# Patient Record
Sex: MALE | Race: WHITE | Employment: FULL TIME | URBAN - METROPOLITAN AREA
[De-identification: names, ages, dates, MRNs, and addresses within clinical notes are randomized per-mention and may not be internally consistent; named-entity substitution may affect disease eponyms.]

---

## 2019-06-08 ENCOUNTER — APPOINTMENT (OUTPATIENT)
Dept: GENERAL RADIOLOGY | Facility: CLINIC | Age: 24
End: 2019-06-08
Attending: NURSE PRACTITIONER
Payer: COMMERCIAL

## 2019-06-08 ENCOUNTER — HOSPITAL ENCOUNTER (EMERGENCY)
Facility: CLINIC | Age: 24
Discharge: HOME | End: 2019-06-08
Attending: NURSE PRACTITIONER | Admitting: NURSE PRACTITIONER
Payer: COMMERCIAL

## 2019-06-08 VITALS
RESPIRATION RATE: 16 BRPM | SYSTOLIC BLOOD PRESSURE: 148 MMHG | HEART RATE: 99 BPM | WEIGHT: 249.56 LBS | TEMPERATURE: 98.1 F | BODY MASS INDEX: 34.82 KG/M2 | OXYGEN SATURATION: 99 % | DIASTOLIC BLOOD PRESSURE: 92 MMHG

## 2019-06-08 DIAGNOSIS — S62.312A: ICD-10-CM

## 2019-06-08 PROCEDURE — 99283 EMERGENCY DEPT VISIT LOW MDM: CPT | Mod: 25 | Performed by: NURSE PRACTITIONER

## 2019-06-08 PROCEDURE — 26600 TREAT METACARPAL FRACTURE: CPT | Mod: RT | Performed by: NURSE PRACTITIONER

## 2019-06-08 PROCEDURE — 99284 EMERGENCY DEPT VISIT MOD MDM: CPT | Mod: 25 | Performed by: NURSE PRACTITIONER

## 2019-06-08 PROCEDURE — 73130 X-RAY EXAM OF HAND: CPT | Mod: TC,RT

## 2019-06-08 RX ORDER — IBUPROFEN 600 MG/1
600 TABLET, FILM COATED ORAL EVERY 6 HOURS PRN
COMMUNITY

## 2019-06-08 ASSESSMENT — ENCOUNTER SYMPTOMS
BRUISES/BLEEDS EASILY: 0
WOUND: 1
COLOR CHANGE: 1
JOINT SWELLING: 1
WEAKNESS: 0
NUMBNESS: 0

## 2019-06-08 NOTE — LETTER
[facility] [place] EMERGENCY DEPARTMENT  [address]   [place] [address]  [phone]      2019    Jcarlos Maravilla  [address] [facility] [address]  [phone] (home)     : 1995      To Whom it may concern:    Jcarlos Maravilla was seen in our Emergency Department today, 2019.    For the next 1 days he should not work    The employee has right hand injury and will require only left handed work if able to accommodate until he is cleared by orthopedics.       Sincerely,    TOYA Corbett CNP

## 2019-06-08 NOTE — ED AVS SNAPSHOT
[facility] Emergency Department  [address] [place] [address]  Phone:  [phone]  Fax:  [phone]                                    Jcarlos Maravilla   MRN: [id number]    Department:  [facility] Emergency Department   Date of Visit:  6/8/2019           After Visit Summary Signature Page    I have received my discharge instructions, and my questions have been answered. I have discussed any challenges I see with this plan with the nurse or doctor.    ..........................................................................................................................................  Patient/Patient Representative Signature      ..........................................................................................................................................  Patient Representative Print Name and Relationship to Patient    ..................................................               ................................................  Date                                   Time    ..........................................................................................................................................  Reviewed by Signature/Title    ...................................................              ..............................................  Date                                               Time          22EPIC Rev 08/18

## 2019-06-09 NOTE — ED PROVIDER NOTES
"  History     Chief Complaint   Patient presents with     Hand Pain     Right hand injury while at working at [employer] in [place]. Occured yesterday at 2300     HPI  Jcarlos Maravilla is a 23 year old male who was working at [employer] last night and as he turned around his right hand struck a drink cooler noting immediate right hand pain and abrasion over 4th knuckle.  Since has noted more swelling and pain.  He presents after work this evening. Movement \"fist\" worsens pain. Has full extension of fingers.     Allergies:  Allergies   Allergen Reactions     No Known Drug Allergies        Problem List:    There are no active problems to display for this patient.       Past Medical History:    No past medical history on file.    Past Surgical History:    No past surgical history on file.    Family History:    No family history on file.    Social History:  Marital Status:  Single [1]  Social History     Tobacco Use     Smoking status: Current Some Day Smoker     Smokeless tobacco: Never Used     Tobacco comment: second hand exposure   Substance Use Topics     Alcohol use: No     Drug use: No        Medications:      acetaminophen-codeine (TYLENOL #3) 300-30 MG tablet   ibuprofen (ADVIL/MOTRIN) 600 MG tablet         Review of Systems   Musculoskeletal: Positive for joint swelling.   Skin: Positive for color change and wound.        Abrasion and bruising to dorsal right hand   Neurological: Negative for weakness and numbness.   Hematological: Does not bruise/bleed easily.       Physical Exam   BP: (!) 148/92  Pulse: 99  Temp: 98.1  F (36.7  C)  Resp: 16  Weight: 113.2 kg (249 lb 9 oz)  SpO2: 99 %      Physical Exam   Constitutional: He appears well-developed and well-nourished. No distress.   HENT:   Head: Normocephalic and atraumatic.   Musculoskeletal:        Right hand: He exhibits tenderness, bony tenderness and swelling. He exhibits normal range of motion. Normal sensation noted. Normal strength noted.        " Hands:  Neurological: He is alert. He has normal strength. No sensory deficit.   5/5 strength to right hand including finger opposition   Skin: He is not diaphoretic.   Nursing note and vitals reviewed.      Discussed with patient concern for boxer's fracture. Will proceed with xray. Tendons are intact.     Discussed with patient oblique mildly displaced fracture at base of right third metacarpal. Recommend splint and referral to orthopedics.  He is neurovascularly intact.         Splint application  Date/Time: 6/8/2019 11:41 PM  Performed by: Kacy Sanchez APRN CNP  Authorized by: Kacy Sanchez APRN CNP   Consent: Verbal consent obtained.  Risks and benefits: risks, benefits and alternatives were discussed  Consent given by: patient  Patient understanding: patient states understanding of the procedure being performed  Patient identity confirmed: verbally with patient  Location details: right hand  Splint type: Tear drop.  Supplies used: cotton padding,  elastic bandage and Ortho-Glass  Post-procedure: The splinted body part was neurovascularly unchanged following the procedure.  Patient tolerance: Patient tolerated the procedure well with no immediate complications                     Critical Care time:  none               Results for orders placed or performed during the hospital encounter of 06/08/19 (from the past 24 hour(s))   XR Hand Right G/E 3 Views    Narrative    XR HAND RT G/E 3 VW 6/8/2019 10:31 PM    HISTORY: Pain.    COMPARISON: None.      Impression    IMPRESSION: Mildly displaced oblique fracture of the base of the third  metacarpal.     BRANDEN WASHBURN MD       Medications - No data to display    Assessments & Plan (with Medical Decision Making)  Refer to orthopedics within 1 week. Discussed splint care and neurovascular checks to fingers. Take motrin in addition to T#3 for pain.      I have reviewed the nursing notes.    I have reviewed the findings, diagnosis, plan and need  for follow up with the patient.          Medication List      Started    acetaminophen-codeine 300-30 MG tablet  Commonly known as:  TYLENOL #3  1 tablet, Oral, EVERY 6 HOURS PRN            Final diagnoses:   Fracture of base of third metacarpal bone of right hand       6/8/2019   [facility] EMERGENCY DEPARTMENT     Kacy Sanchez, TOYA CNP  06/08/19 5633

## 2019-06-09 NOTE — DISCHARGE INSTRUCTIONS
You have a mildly displaced fracture of the base 3rd metacarpal bone right hand.  MUST follow up with orthopedic surgery within 3-7 days

## 2019-06-09 NOTE — ED TRIAGE NOTES
Pt presents with right hand injury. Pt was working when accidentally hit hand against drink station.

## 2020-05-03 ENCOUNTER — HOSPITAL ENCOUNTER (INPATIENT)
Facility: CLINIC | Age: 25
LOS: 1 days | Discharge: HOME | End: 2020-05-04
Attending: FAMILY MEDICINE | Admitting: HOSPITALIST
Payer: MEDICAID

## 2020-05-03 DIAGNOSIS — A41.9 BACTERIAL SEPSIS (H): ICD-10-CM

## 2020-05-03 DIAGNOSIS — I80.8 THROMBOPHLEBITIS ARM: Primary | ICD-10-CM

## 2020-05-03 DIAGNOSIS — F11.20 HEROIN ADDICTION (H): ICD-10-CM

## 2020-05-03 DIAGNOSIS — F19.10 IV DRUG ABUSE (H): ICD-10-CM

## 2020-05-03 DIAGNOSIS — R50.9 FEVER, UNSPECIFIED FEVER CAUSE: ICD-10-CM

## 2020-05-03 PROCEDURE — 96367 TX/PROPH/DG ADDL SEQ IV INF: CPT | Performed by: FAMILY MEDICINE

## 2020-05-03 PROCEDURE — 99285 EMERGENCY DEPT VISIT HI MDM: CPT | Mod: Z6 | Performed by: FAMILY MEDICINE

## 2020-05-03 PROCEDURE — 96365 THER/PROPH/DIAG IV INF INIT: CPT | Performed by: FAMILY MEDICINE

## 2020-05-03 PROCEDURE — 96361 HYDRATE IV INFUSION ADD-ON: CPT | Performed by: FAMILY MEDICINE

## 2020-05-03 PROCEDURE — 99285 EMERGENCY DEPT VISIT HI MDM: CPT | Mod: 25 | Performed by: FAMILY MEDICINE

## 2020-05-03 PROCEDURE — 96366 THER/PROPH/DIAG IV INF ADDON: CPT | Performed by: FAMILY MEDICINE

## 2020-05-04 ENCOUNTER — APPOINTMENT (OUTPATIENT)
Dept: ULTRASOUND IMAGING | Facility: CLINIC | Age: 25
End: 2020-05-04
Attending: HOSPITALIST
Payer: MEDICAID

## 2020-05-04 ENCOUNTER — APPOINTMENT (OUTPATIENT)
Dept: GENERAL RADIOLOGY | Facility: CLINIC | Age: 25
End: 2020-05-04
Attending: FAMILY MEDICINE
Payer: MEDICAID

## 2020-05-04 ENCOUNTER — APPOINTMENT (OUTPATIENT)
Dept: CARDIOLOGY | Facility: CLINIC | Age: 25
End: 2020-05-04
Attending: HOSPITALIST
Payer: MEDICAID

## 2020-05-04 VITALS
DIASTOLIC BLOOD PRESSURE: 78 MMHG | HEIGHT: 72 IN | HEART RATE: 73 BPM | TEMPERATURE: 98.1 F | OXYGEN SATURATION: 100 % | BODY MASS INDEX: 29.8 KG/M2 | RESPIRATION RATE: 18 BRPM | SYSTOLIC BLOOD PRESSURE: 120 MMHG | WEIGHT: 220 LBS

## 2020-05-04 PROBLEM — I80.8 THROMBOPHLEBITIS ARM: Status: ACTIVE | Noted: 2020-05-04

## 2020-05-04 PROBLEM — R50.9 FEVER OF UNKNOWN ORIGIN: Status: ACTIVE | Noted: 2020-05-04

## 2020-05-04 PROBLEM — F11.20 HEROIN ADDICTION (H): Status: ACTIVE | Noted: 2020-05-04

## 2020-05-04 PROBLEM — R50.9 FEVER: Status: ACTIVE | Noted: 2020-05-04

## 2020-05-04 PROBLEM — A41.9 BACTERIAL SEPSIS (H): Status: ACTIVE | Noted: 2020-05-04

## 2020-05-04 PROBLEM — R50.9 FEVER, UNSPECIFIED FEVER CAUSE: Status: ACTIVE | Noted: 2020-05-04

## 2020-05-04 PROBLEM — F19.10 IV DRUG ABUSE (H): Status: ACTIVE | Noted: 2020-05-04

## 2020-05-04 LAB
ALBUMIN UR-MCNC: NEGATIVE MG/DL
AMPHETAMINES UR QL: NOT DETECTED NG/ML
ANION GAP SERPL CALCULATED.3IONS-SCNC: 6 MMOL/L (ref 3–14)
ANION GAP SERPL CALCULATED.3IONS-SCNC: 6 MMOL/L (ref 3–14)
APPEARANCE UR: CLEAR
BARBITURATES UR QL SCN: NOT DETECTED NG/ML
BASOPHILS # BLD AUTO: 0 10E9/L (ref 0–0.2)
BASOPHILS NFR BLD AUTO: 0.7 %
BENZODIAZ UR QL SCN: ABNORMAL NG/ML
BILIRUB UR QL STRIP: NEGATIVE
BUN SERPL-MCNC: 10 MG/DL (ref 7–30)
BUN SERPL-MCNC: 9 MG/DL (ref 7–30)
BUPRENORPHINE UR QL: NOT DETECTED NG/ML
CALCIUM SERPL-MCNC: 7.5 MG/DL (ref 8.5–10.1)
CALCIUM SERPL-MCNC: 7.9 MG/DL (ref 8.5–10.1)
CANNABINOIDS UR QL: NOT DETECTED NG/ML
CHLORIDE SERPL-SCNC: 107 MMOL/L (ref 94–109)
CHLORIDE SERPL-SCNC: 109 MMOL/L (ref 94–109)
CO2 SERPL-SCNC: 23 MMOL/L (ref 20–32)
CO2 SERPL-SCNC: 24 MMOL/L (ref 20–32)
COCAINE UR QL SCN: NOT DETECTED NG/ML
COLOR UR AUTO: YELLOW
CREAT SERPL-MCNC: 0.97 MG/DL (ref 0.66–1.25)
CREAT SERPL-MCNC: 0.99 MG/DL (ref 0.66–1.25)
CREAT SERPL-MCNC: 1.24 MG/DL (ref 0.66–1.25)
CRP SERPL-MCNC: 30.9 MG/L (ref 0–8)
CRP SERPL-MCNC: 33.2 MG/L (ref 0–8)
D-METHAMPHET UR QL: NOT DETECTED NG/ML
DIFFERENTIAL METHOD BLD: ABNORMAL
EOSINOPHIL NFR BLD AUTO: 0.3 %
ERYTHROCYTE [DISTWIDTH] IN BLOOD BY AUTOMATED COUNT: 17.4 % (ref 10–15)
ERYTHROCYTE [DISTWIDTH] IN BLOOD BY AUTOMATED COUNT: 18.1 % (ref 10–15)
GFR SERPL CREATININE-BSD FRML MDRD: 82 ML/MIN/{1.73_M2}
GFR SERPL CREATININE-BSD FRML MDRD: >90 ML/MIN/{1.73_M2}
GFR SERPL CREATININE-BSD FRML MDRD: >90 ML/MIN/{1.73_M2}
GLUCOSE SERPL-MCNC: 86 MG/DL (ref 70–99)
GLUCOSE SERPL-MCNC: 88 MG/DL (ref 70–99)
GLUCOSE UR STRIP-MCNC: NEGATIVE MG/DL
HCT VFR BLD AUTO: 34.1 % (ref 40–53)
HCT VFR BLD AUTO: 39 % (ref 40–53)
HGB BLD-MCNC: 10.3 G/DL (ref 13.3–17.7)
HGB BLD-MCNC: 11.6 G/DL (ref 13.3–17.7)
HGB UR QL STRIP: NEGATIVE
IMM GRANULOCYTES # BLD: 0 10E9/L (ref 0–0.4)
IMM GRANULOCYTES NFR BLD: 0.3 %
KETONES UR STRIP-MCNC: 5 MG/DL
LACTATE BLD-SCNC: 0.5 MMOL/L (ref 0.7–2)
LACTATE BLD-SCNC: 2.3 MMOL/L (ref 0.7–2)
LEUKOCYTE ESTERASE UR QL STRIP: NEGATIVE
LYMPHOCYTES # BLD AUTO: 1.4 10E9/L (ref 0.8–5.3)
LYMPHOCYTES NFR BLD AUTO: 22.4 %
MCH RBC QN AUTO: 17.2 PG (ref 26.5–33)
MCH RBC QN AUTO: 17.3 PG (ref 26.5–33)
MCHC RBC AUTO-ENTMCNC: 29.7 G/DL (ref 31.5–36.5)
MCHC RBC AUTO-ENTMCNC: 30.2 G/DL (ref 31.5–36.5)
MCV RBC AUTO: 57 FL (ref 78–100)
MCV RBC AUTO: 58 FL (ref 78–100)
METHADONE UR QL SCN: NOT DETECTED NG/ML
MONOCYTES # BLD AUTO: 0.5 10E9/L (ref 0–1.3)
MONOCYTES NFR BLD AUTO: 9 %
NEUTROPHILS # BLD AUTO: 4.1 10E9/L (ref 1.6–8.3)
NEUTROPHILS NFR BLD AUTO: 67.3 %
NITRATE UR QL: NEGATIVE
NRBC # BLD AUTO: 0 10*3/UL
NRBC BLD AUTO-RTO: 0 /100
OPIATES UR QL SCN: ABNORMAL NG/ML
OXYCODONE UR QL SCN: NOT DETECTED NG/ML
PCP UR QL SCN: NOT DETECTED NG/ML
PH UR STRIP: 5 PH (ref 5–7)
PLATELET # BLD AUTO: 189 10E9/L (ref 150–450)
PLATELET # BLD AUTO: 190 10E9/L (ref 150–450)
PLATELET # BLD AUTO: 197 10E9/L (ref 150–450)
POTASSIUM SERPL-SCNC: 3.9 MMOL/L (ref 3.4–5.3)
POTASSIUM SERPL-SCNC: 3.9 MMOL/L (ref 3.4–5.3)
PROCALCITONIN SERPL-MCNC: 1.22 NG/ML
PROPOXYPH UR QL: NOT DETECTED NG/ML
RBC # BLD AUTO: 5.94 10E12/L (ref 4.4–5.9)
RBC # BLD AUTO: 6.73 10E12/L (ref 4.4–5.9)
SARS-COV-2 PCR COMMENT: NORMAL
SARS-COV-2 RNA SPEC QL NAA+PROBE: NEGATIVE
SARS-COV-2 RNA SPEC QL NAA+PROBE: NORMAL
SODIUM SERPL-SCNC: 137 MMOL/L (ref 133–144)
SODIUM SERPL-SCNC: 138 MMOL/L (ref 133–144)
SOURCE: ABNORMAL
SP GR UR STRIP: 1.01 (ref 1–1.03)
SPECIMEN SOURCE: NORMAL
SPECIMEN SOURCE: NORMAL
TRICYCLICS UR QL SCN: NOT DETECTED NG/ML
UROBILINOGEN UR STRIP-MCNC: 0 MG/DL (ref 0–2)
WBC # BLD AUTO: 4.4 10E9/L (ref 4–11)
WBC # BLD AUTO: 6 10E9/L (ref 4–11)

## 2020-05-04 PROCEDURE — 99223 1ST HOSP IP/OBS HIGH 75: CPT | Mod: AI | Performed by: HOSPITALIST

## 2020-05-04 PROCEDURE — 93306 TTE W/DOPPLER COMPLETE: CPT | Mod: 26 | Performed by: INTERNAL MEDICINE

## 2020-05-04 PROCEDURE — 25800030 ZZH RX IP 258 OP 636: Performed by: INTERNAL MEDICINE

## 2020-05-04 PROCEDURE — 80048 BASIC METABOLIC PNL TOTAL CA: CPT | Performed by: FAMILY MEDICINE

## 2020-05-04 PROCEDURE — 12000000 ZZH R&B MED SURG/OB

## 2020-05-04 PROCEDURE — 87077 CULTURE AEROBIC IDENTIFY: CPT | Performed by: FAMILY MEDICINE

## 2020-05-04 PROCEDURE — 87800 DETECT AGNT MULT DNA DIREC: CPT | Performed by: FAMILY MEDICINE

## 2020-05-04 PROCEDURE — 25800030 ZZH RX IP 258 OP 636: Performed by: HOSPITALIST

## 2020-05-04 PROCEDURE — 25000128 H RX IP 250 OP 636: Performed by: FAMILY MEDICINE

## 2020-05-04 PROCEDURE — 36415 COLL VENOUS BLD VENIPUNCTURE: CPT | Performed by: FAMILY MEDICINE

## 2020-05-04 PROCEDURE — 25000132 ZZH RX MED GY IP 250 OP 250 PS 637: Performed by: HOSPITALIST

## 2020-05-04 PROCEDURE — 80306 DRUG TEST PRSMV INSTRMNT: CPT | Performed by: FAMILY MEDICINE

## 2020-05-04 PROCEDURE — 99207 ZZC APP CREDIT; MD BILLING SHARED VISIT: CPT | Performed by: INTERNAL MEDICINE

## 2020-05-04 PROCEDURE — 93970 EXTREMITY STUDY: CPT

## 2020-05-04 PROCEDURE — 93306 TTE W/DOPPLER COMPLETE: CPT

## 2020-05-04 PROCEDURE — 25000128 H RX IP 250 OP 636: Performed by: HOSPITALIST

## 2020-05-04 PROCEDURE — 86140 C-REACTIVE PROTEIN: CPT | Performed by: FAMILY MEDICINE

## 2020-05-04 PROCEDURE — 86140 C-REACTIVE PROTEIN: CPT | Performed by: INTERNAL MEDICINE

## 2020-05-04 PROCEDURE — 84145 PROCALCITONIN (PCT): CPT | Performed by: FAMILY MEDICINE

## 2020-05-04 PROCEDURE — 82565 ASSAY OF CREATININE: CPT | Performed by: HOSPITALIST

## 2020-05-04 PROCEDURE — 85025 COMPLETE CBC W/AUTO DIFF WBC: CPT | Performed by: FAMILY MEDICINE

## 2020-05-04 PROCEDURE — 25000132 ZZH RX MED GY IP 250 OP 250 PS 637: Performed by: INTERNAL MEDICINE

## 2020-05-04 PROCEDURE — 81003 URINALYSIS AUTO W/O SCOPE: CPT | Performed by: FAMILY MEDICINE

## 2020-05-04 PROCEDURE — 83605 ASSAY OF LACTIC ACID: CPT | Performed by: FAMILY MEDICINE

## 2020-05-04 PROCEDURE — 87186 SC STD MICRODIL/AGAR DIL: CPT | Performed by: FAMILY MEDICINE

## 2020-05-04 PROCEDURE — 87635 SARS-COV-2 COVID-19 AMP PRB: CPT | Performed by: FAMILY MEDICINE

## 2020-05-04 PROCEDURE — 87040 BLOOD CULTURE FOR BACTERIA: CPT | Performed by: FAMILY MEDICINE

## 2020-05-04 PROCEDURE — 85049 AUTOMATED PLATELET COUNT: CPT | Performed by: HOSPITALIST

## 2020-05-04 PROCEDURE — 71045 X-RAY EXAM CHEST 1 VIEW: CPT | Mod: TC

## 2020-05-04 PROCEDURE — 25000132 ZZH RX MED GY IP 250 OP 250 PS 637: Performed by: FAMILY MEDICINE

## 2020-05-04 PROCEDURE — 25800030 ZZH RX IP 258 OP 636: Performed by: FAMILY MEDICINE

## 2020-05-04 PROCEDURE — 36415 COLL VENOUS BLD VENIPUNCTURE: CPT | Performed by: HOSPITALIST

## 2020-05-04 PROCEDURE — 85027 COMPLETE CBC AUTOMATED: CPT | Performed by: HOSPITALIST

## 2020-05-04 PROCEDURE — 80048 BASIC METABOLIC PNL TOTAL CA: CPT | Performed by: HOSPITALIST

## 2020-05-04 RX ORDER — SODIUM CHLORIDE 9 MG/ML
INJECTION, SOLUTION INTRAVENOUS CONTINUOUS
Status: DISCONTINUED | OUTPATIENT
Start: 2020-05-04 | End: 2020-05-04 | Stop reason: HOSPADM

## 2020-05-04 RX ORDER — ONDANSETRON 2 MG/ML
4 INJECTION INTRAMUSCULAR; INTRAVENOUS EVERY 6 HOURS PRN
Status: DISCONTINUED | OUTPATIENT
Start: 2020-05-04 | End: 2020-05-04 | Stop reason: HOSPADM

## 2020-05-04 RX ORDER — ACETAMINOPHEN 325 MG/1
650 TABLET ORAL EVERY 4 HOURS PRN
Status: DISCONTINUED | OUTPATIENT
Start: 2020-05-04 | End: 2020-05-04 | Stop reason: HOSPADM

## 2020-05-04 RX ORDER — IBUPROFEN 600 MG/1
600 TABLET, FILM COATED ORAL EVERY 6 HOURS PRN
Status: DISCONTINUED | OUTPATIENT
Start: 2020-05-04 | End: 2020-05-04 | Stop reason: DRUGHIGH

## 2020-05-04 RX ORDER — LIDOCAINE 40 MG/G
CREAM TOPICAL
Status: DISCONTINUED | OUTPATIENT
Start: 2020-05-04 | End: 2020-05-04 | Stop reason: HOSPADM

## 2020-05-04 RX ORDER — LORAZEPAM 2 MG/ML
1 INJECTION INTRAMUSCULAR EVERY 4 HOURS PRN
Status: DISCONTINUED | OUTPATIENT
Start: 2020-05-04 | End: 2020-05-04 | Stop reason: HOSPADM

## 2020-05-04 RX ORDER — DICYCLOMINE HYDROCHLORIDE 10 MG/1
10 CAPSULE ORAL 4 TIMES DAILY PRN
Status: DISCONTINUED | OUTPATIENT
Start: 2020-05-04 | End: 2020-05-04 | Stop reason: HOSPADM

## 2020-05-04 RX ORDER — CLONIDINE HYDROCHLORIDE 0.1 MG/1
0.1 TABLET ORAL 3 TIMES DAILY PRN
Status: DISCONTINUED | OUTPATIENT
Start: 2020-05-04 | End: 2020-05-04 | Stop reason: HOSPADM

## 2020-05-04 RX ORDER — LOPERAMIDE HCL 2 MG
2 CAPSULE ORAL 4 TIMES DAILY PRN
Status: DISCONTINUED | OUTPATIENT
Start: 2020-05-04 | End: 2020-05-04 | Stop reason: HOSPADM

## 2020-05-04 RX ORDER — LOPERAMIDE HCL 2 MG
2 CAPSULE ORAL 4 TIMES DAILY PRN
Qty: 20 CAPSULE | Refills: 0 | Status: SHIPPED | OUTPATIENT
Start: 2020-05-04

## 2020-05-04 RX ORDER — NALOXONE HYDROCHLORIDE 0.4 MG/ML
.1-.4 INJECTION, SOLUTION INTRAMUSCULAR; INTRAVENOUS; SUBCUTANEOUS
Status: DISCONTINUED | OUTPATIENT
Start: 2020-05-04 | End: 2020-05-04 | Stop reason: HOSPADM

## 2020-05-04 RX ORDER — METOPROLOL TARTRATE 25 MG/1
25 TABLET, FILM COATED ORAL 2 TIMES DAILY PRN
Status: DISCONTINUED | OUTPATIENT
Start: 2020-05-04 | End: 2020-05-04 | Stop reason: HOSPADM

## 2020-05-04 RX ORDER — MORPHINE SULFATE 2 MG/ML
2-4 INJECTION, SOLUTION INTRAMUSCULAR; INTRAVENOUS
Status: DISCONTINUED | OUTPATIENT
Start: 2020-05-04 | End: 2020-05-04

## 2020-05-04 RX ORDER — LORAZEPAM 1 MG/1
1 TABLET ORAL EVERY 4 HOURS PRN
Status: DISCONTINUED | OUTPATIENT
Start: 2020-05-04 | End: 2020-05-04 | Stop reason: HOSPADM

## 2020-05-04 RX ORDER — CALCIUM CARBONATE 500 MG/1
500 TABLET, CHEWABLE ORAL 2 TIMES DAILY
Status: DISCONTINUED | OUTPATIENT
Start: 2020-05-04 | End: 2020-05-04 | Stop reason: HOSPADM

## 2020-05-04 RX ORDER — FAMOTIDINE 10 MG
10 TABLET ORAL 2 TIMES DAILY
Status: DISCONTINUED | OUTPATIENT
Start: 2020-05-04 | End: 2020-05-04 | Stop reason: HOSPADM

## 2020-05-04 RX ORDER — IBUPROFEN 400 MG/1
400 TABLET, FILM COATED ORAL EVERY 6 HOURS PRN
Status: DISCONTINUED | OUTPATIENT
Start: 2020-05-04 | End: 2020-05-04 | Stop reason: HOSPADM

## 2020-05-04 RX ORDER — OXYCODONE HCL 10 MG/1
10 TABLET, FILM COATED, EXTENDED RELEASE ORAL ONCE
Status: COMPLETED | OUTPATIENT
Start: 2020-05-04 | End: 2020-05-04

## 2020-05-04 RX ORDER — ONDANSETRON 4 MG/1
4 TABLET, ORALLY DISINTEGRATING ORAL EVERY 6 HOURS PRN
Status: DISCONTINUED | OUTPATIENT
Start: 2020-05-04 | End: 2020-05-04 | Stop reason: HOSPADM

## 2020-05-04 RX ADMIN — OXYCODONE HYDROCHLORIDE 10 MG: 10 TABLET, FILM COATED, EXTENDED RELEASE ORAL at 19:41

## 2020-05-04 RX ADMIN — SODIUM CHLORIDE 1000 ML: 9 INJECTION, SOLUTION INTRAVENOUS at 01:29

## 2020-05-04 RX ADMIN — SODIUM CHLORIDE 1000 ML: 9 INJECTION, SOLUTION INTRAVENOUS at 00:45

## 2020-05-04 RX ADMIN — ACETAMINOPHEN 650 MG: 325 TABLET, FILM COATED ORAL at 06:01

## 2020-05-04 RX ADMIN — TAZOBACTAM SODIUM AND PIPERACILLIN SODIUM 4.5 G: 500; 4 INJECTION, SOLUTION INTRAVENOUS at 09:15

## 2020-05-04 RX ADMIN — CALCIUM CARBONATE (ANTACID) CHEW TAB 500 MG 500 MG: 500 CHEW TAB at 19:41

## 2020-05-04 RX ADMIN — APIXABAN 10 MG: 5 TABLET, FILM COATED ORAL at 19:41

## 2020-05-04 RX ADMIN — AMOXICILLIN AND CLAVULANATE POTASSIUM 1 TABLET: 875; 125 TABLET, FILM COATED ORAL at 19:42

## 2020-05-04 RX ADMIN — TAZOBACTAM SODIUM AND PIPERACILLIN SODIUM 4.5 G: 500; 4 INJECTION, SOLUTION INTRAVENOUS at 01:48

## 2020-05-04 RX ADMIN — VANCOMYCIN HYDROCHLORIDE 2000 MG: 1 INJECTION, POWDER, LYOPHILIZED, FOR SOLUTION INTRAVENOUS at 02:24

## 2020-05-04 RX ADMIN — FAMOTIDINE 10 MG: 10 TABLET ORAL at 09:16

## 2020-05-04 RX ADMIN — SODIUM CHLORIDE: 9 INJECTION, SOLUTION INTRAVENOUS at 06:02

## 2020-05-04 RX ADMIN — SODIUM CHLORIDE 1000 ML: 9 INJECTION, SOLUTION INTRAVENOUS at 02:23

## 2020-05-04 RX ADMIN — CALCIUM CARBONATE (ANTACID) CHEW TAB 500 MG 500 MG: 500 CHEW TAB at 09:16

## 2020-05-04 RX ADMIN — SODIUM CHLORIDE: 9 INJECTION, SOLUTION INTRAVENOUS at 14:02

## 2020-05-04 RX ADMIN — ENOXAPARIN SODIUM 40 MG: 40 INJECTION SUBCUTANEOUS at 09:16

## 2020-05-04 RX ADMIN — TAZOBACTAM SODIUM AND PIPERACILLIN SODIUM 4.5 G: 500; 4 INJECTION, SOLUTION INTRAVENOUS at 14:17

## 2020-05-04 RX ADMIN — VANCOMYCIN HYDROCHLORIDE 2000 MG: 1 INJECTION, POWDER, LYOPHILIZED, FOR SOLUTION INTRAVENOUS at 14:54

## 2020-05-04 ASSESSMENT — ACTIVITIES OF DAILY LIVING (ADL)
TRANSFERRING: 0-->INDEPENDENT
ADLS_ACUITY_SCORE: 10
ADLS_ACUITY_SCORE: 10
FALL_HISTORY_WITHIN_LAST_SIX_MONTHS: NO
AMBULATION: 0-->INDEPENDENT
TOILETING: 0-->INDEPENDENT
RETIRED_COMMUNICATION: 0-->UNDERSTANDS/COMMUNICATES WITHOUT DIFFICULTY
DRESS: 0-->INDEPENDENT
COGNITION: 0 - NO COGNITION ISSUES REPORTED
SWALLOWING: 0-->SWALLOWS FOODS/LIQUIDS WITHOUT DIFFICULTY
ADLS_ACUITY_SCORE: 11
BATHING: 0-->INDEPENDENT
ADLS_ACUITY_SCORE: 10
RETIRED_EATING: 0-->INDEPENDENT

## 2020-05-04 ASSESSMENT — MIFFLIN-ST. JEOR: SCORE: 2035.91

## 2020-05-04 NOTE — ED NOTES
Pt states that he injects heroin daily and also on occasion uses marijuana, xanax, percocet, and alcohol. Pt has marks noted to both arms that he states are from injection heroin. Pt reports April first he reached out to [facility] teen [facility] for help with his drug use but has not received and help of information from them. Pt states this evening he would like to see if he could get some help with his drug problem. Pt is cooperative and denies any thoughts or plan to self harm. Pt admits when he was younger he attempted self harm by cutting.

## 2020-05-04 NOTE — DISCHARGE INSTRUCTIONS
Behavioral/Mental Health Resources  [place], [place], [facility], [facility], [place], [place], [facility], [place] and [facility]    **Patient should check with their health insurance to identify providers in network**    Mental Health Crisis Numbers:  [place] after hours Crisis Line      [phone]     Options For Deaf + Hard of Hearing    1-208.576.1240    Text [place] to 085694      24/7 Crisis Texting line    Trans Lifeline  (Fight the epidemic of trans suicide)  1-290.941.6182    https://www.translifeline.org/    The BELLA HelpLine can be reached Monday through Friday, 10 am-6 pm, ET.  6-532-362-BELLA (0822) or info@bella.org    National Suicide Prevention LifeLine       Call the National Suicide Prevention Lifeline at 1-518-471-TALK (7052) to be connected to a counselor at a crisis center in your area if you, a family member, or friend are experiencing thoughts of suicide. The call is FREE, confidential, and always available.     *Fast-Tracker is an online mental health/chemical dependency resource site. Mental health providers, care coordinators and consumers can go to www.fast-trackermn.org  to search for community mental health providers and information with a real-time, searchable directory of mental health resources and their availability within [place]*    Crisis Mobile Services:  [facility]   [phone]  [facility]   [phone]  [facility]       [phone]  [facility]        SAME AS ABOVE  [facility]       SAME AS ABOVE  [facility]      SAME AS ABOVE  [facility]      SAME AS ABOVE  [place] and [place]:      [phone] or [phone]  Other Counties:    [place]-  Adults   [phone]   Children [phone]   [place]-  Adults  [phone]  Children [phone]     Pregnancy & Post-partum Support     Helpline [phone]  Farm & Rural Helpline NEW 3-2019    [phone]       Chemical Health Services:    Medicare & Chemical  Health Assessments:  [facility]   [address]   [place], [place]   [phone]      [facility] [address] [facility]    [address], [place]  [phone]  [facility]          [address]     [place], [place]  [phone]    Rule 25 and/or Chemical Health Assessment:  If a person has insurance, s/he must contact their insurance companies Behavioral Health division and follow recommendations for a chemical health assessment and then follow the recommendations of the .    If a person does NOT have insurance, they must get a Rule 25 (state funded chemical health assessment).  They can contact their [facility]'s Chemical Health Unit to discover who their [facility] networks to conduct the assessment.      Chemical Dependency Detox:  - [facility]:  [phone] - can ask for other recommendations  - [facility]/[place]([facility]):  [phone]  - [facility] ([facility]):  [phone]  - [facility] : [phone] [facility]  - [facility]):  [phone]  - [place] ([facility]):  [phone]    Chemical Dependency Assessment:   - [facility]: [phone]   - Behavioral Health Providers, can help find a provider near you:  [phone]  - No insurance- call county of residence and ask about applying for a Rule 25    Counseling/psychotherapy:  - Associated clinic of psychology - [place],/[place]/ [facility]/[place] /other locations: [phone]  - Behavioral Healthcare Providers- Can help find a provider near you:  [phone]  - [facility] Services-[place]:  [phone]  - [facility]- [place]:  [phone]  - [facility]- [place]:  [phone]  - [facility]-[place]: [phone]  - [facility]- [place]/[facility]/other locations:  [phone]  - [facility]-  [facility]/[place]:  [phone]  - [facility] - [place]:  [phone]  - [facility] [place] - [place]:  [phone]  - [facility]- [place]-based in [place]:  [phone]  - [phone]  - [facility]- [place]: [phone]  - [facility]- [place] [phone]  - [facility] counseling located in [place] (not affiliated with [place]): [phone]  - [facility]- [place]:  [phone]/[place]: [phone] and other locations.  - [facility]- [place]: [phone]  - Psychiatric Recovery- [place]:  [phone]  - Therapeutic Services Agency- [facility]/[place]: [phone]  - Walk in counseling center (Free counseling services)- [facility]: [phone]     Psychiatry/ Mental Health Medication management:  - [facility] psychology- [place],/[place]/[facility]/ [facility]/ [facility]: [phone]  - Behavioral Healthcare Providers- Can help find a provider near you, if you have preferred one or [facility] they can identify who is in network and assist with scheduling an appointment:  [phone]  - [facility]: [place]/[facility] locations : [phone]  - [facility] -Collaborative model with PCP involvement:  [phone] (requires PCP referral specifically)  - [facility]- [place]:  [phone]  - [facility]- [place]: [phone] [place]/[facility]:  [phone]/[place]:  [phone]/ [place]:  [phone]  - Psychiatric Recovery- [place]:   [phone]  - [facility] [place], [phone]  - [facility] Psychiatry - Medical students who rotate yearly:  [phone]    County Numbers:  - [facility] [place], [facility] by [facility]: [phone]  - [facility]: [phone]  - [place]: [phone]  - [employer] : [phone]  - [facility]:  [phone]  - [facility]: [phone]  - [facility]: [phone]  - [facility]: [phone]  - [facility]: [phone]  - [facility]: [phone]      Chemical Dependency  [place]  [facility], and [place]                         [phone] [facility]                                                                 [address]                                             [place] [address]  [phone]   [address] [place]   [place]: [phone]  [place]: [phone] [facility]   [place]: [phone]   Sobriety First   [place]: [phone]   Alcohol Hotline   1-891.677.1097     Teen Chemical Dependency  [facility]   [place]: [phone]   [place] [facility]   Teen and adult chemical dependency   [phone]   Mental Health  [address] [address]  [phone]   [facility]   24 hour mental health crisis services   [phone] or [phone]  [place]: [phone]  [place]: [phone]  [place]: [phone]     [facility]: [phone]  [place]: [phone]   [facility]: [phone]     National Suicide Prevention Lifeline   1-213.392.2742 1-511.980.2716 Deaf/ hard of hearing    Text Crisis hotline   Text home to 541962      Disaster Distress helpline   1-168.563.6115  Text TalkWithUs to 67691   [facility] Crisis Line   1-425.518.2508   Dual (Chemical Dependency & Mental Health)  [facility] [place]  Chemical dependency, depression, anger, trauma, loss   [place]: [phone]  [place]: [phone] Women s Recovery and Support Services   Chemical dependency and mental health   [place]: [phone]     **Please note that this list does not include all agencies that provide services**    Care Transitions Team at [facility] [phone]       breast and formula feeding

## 2020-05-04 NOTE — ED TRIAGE NOTES
Pt reports using IV heroin tonight with a friend who overdosed. States he was walking down the street and stopped by the police who informed him of the overdose and told him that he should come to the hospital to be evaluated. Pt reports feeling depressed for the past 2 years but denies any thoughts of self harm or a plan to harm self. Pt reports he has attempted to get help for his drug problem but has been unsuccessful and is requesting help for that at this time.

## 2020-05-04 NOTE — PLAN OF CARE
S. End of shift report    B.  Drug use/Covid R/O    A. VSS, patient is afebrile this shift, RA. Tolerating a regular diet. UP independently in the room. Lungs are clear. And patient has adequate UOP.  Drug screen and UA sent. Zosyn and vanco given. Gracie patients aunt is concerned with patients drug abuse and past behaviors, information was shared with the provider. U/S came back unremarkable. ECHO is in progress.    R. Will continue to monitor per POC.

## 2020-05-04 NOTE — PROGRESS NOTES
Patient was admitted earlier today, with fever up to 101.4, lactic acid acidosis of 2.3, which resolved with IV fluids.  Patient received total 3 L NS in ER.  Remains afebrile throughout the day.  Normal WBCs.  COVID-19 tested negative.  Tested positive for hearing.  Using 0.5 g daily.  No symptoms of opiate withdrawal yet.  Dramatic treatment with clonidine, Imodium, Tylenol, ibuprofen, Bentyl as needed for opiate withdrawal.  Will consider dose of buprenorphine, for symptoms of severe withdrawal.  Patient's aunt Garcie call today, asking to speak to provider.  I asked patient's permission, he declined, stating he will take with him by himself.  States being interested in chemical dependency treatment.  Denies previous histories of chemical dependency.   seen patient today from CD referrals.  Plan of care as outlined at Dr. Matthew [facility].  Plan to discharge home tomorrow if remains afebrile..    Mary Uribe MD

## 2020-05-04 NOTE — CONSULTS
CARE TRANSITION SOCIAL WORK INITIAL ASSESSMENT:      Met with: Patient.    DATA  Principal Problem:    Fever, unspecified fever cause  Active Problems:    Fever    Bacterial sepsis (H)    Heroin addiction (H)    IV drug abuse (H)    Fever of unknown origin       ASSESSMENT  Cognitive Status: awake, alert and oriented        Other Resources: Chemical Dependency Services     Insurance Concerns: No Insurance issues identified     This writer met with pt, by phone, introduced self and role. Discussed discharge planning and chemical dependency resources.  Patient states he has already had the Rule 25 assessment through [place] [facility].  He states he is on a waiting list and waiting for a call for admission.  Patient states he is living with his sister and this is a safe environment for him until he gets into treatment.  Discussed new telephonic services through [place] [facility].  Provided patient with handout.  No other identified discharge needs/concerns.    PLAN    Home with CD follow up    DELANO Springer  [facility] [phone]/ [place] [phone]

## 2020-05-04 NOTE — ED NOTES
DATE:  5/4/2020   TIME OF RECEIPT FROM LAB:  0057  LAB TEST:  Lactic acid  LAB VALUE:  2.3  RESULTS GIVEN WITH READ-BACK TO (PROVIDER):  Dr Cesar and primary RN  TIME LAB VALUE REPORTED TO PROVIDER:   0057

## 2020-05-04 NOTE — PROGRESS NOTES
S-(situation): Patient arrives to room 250 via cart from ED    B-(background): Drug use and Covid rule out.     A-(assessment): Pt presents to room alert and oriented x4 is very pleasant as well as cooperative.   Pt demonstrated safe and steady independent ambulation.  No pain or discomfort reported on admission.   No cough or shortness of breath reported or observed.   There is some old healing bruised marks from injection sites for drug use.   Pt it very honest and open about his drug use.   Pt denies any desire or urge to harm himself.   Iv is patent and infusing.   Tylenol given for a temp of 101.2  Vitals are otherwise stable.   /48   Pulse 111   Temp 101.2  F (38.4  C) (Oral)   Resp 18   Ht 1.829 m (6')   Wt 99.8 kg (220 lb)   SpO2 96%   BMI 29.84 kg/m    Lung sounds are slightly diminished in the bases are otherwise clear throughout.   Pt demonstrated and understanding of call light use.   Pt knows to use the urinal and that a urine sample is to be collected.   Pt is agreeable and cooperative with all contact and cares.   Will continue to monitor and promote comfort as care continues.     R-(recommendations): Orders reviewed with Patient. Will monitor patient per MD orders. YES    Inpatient nursing criteria listed below were met:    Health care directives status obtained and documented: No - no directives in place  SCD's Documented: No - Lovenox for VTE  Skin issues/needs documented:Yes  Isolation needs addressed, if appropriate: Yes  Fall Prevention: Care plan updated, Education given and documented Yes  MRSA swab completed for ICU admissions only: NA  Care Plan initiated: Yes  Education Assessment documented:Yes  Education Documented (Pre-existing chronic infection such as, MRSA/VRE need education on admission): No  Admission Medication Reconciliation completed: Yes  New medication patient education completed and documented (Possible Side Effects of Common Medications handout): Yes  Home  medications if not able to send immediately home with family stored here: NA  Reminder note placed in discharge instructions: NA  Discharge planning review completed (admission navigator) Yes

## 2020-05-04 NOTE — DISCHARGE SUMMARY
[facility]  Hospitalist Discharge Summary      Date of Admission:  5/3/2020  Date of Discharge:  5/4/2020  Discharging Provider: Mary Uribe MD    Discharge Diagnoses   IV drug use, heroin  Febrile illness  Suspected COVID-19 infection-tested negative  Staphylococcus aureus bacteremia  Superficial occlusive thrombophlebitis of the right median cubital vein    Follow-ups Needed After Discharge   Follow-up Appointments     Follow-up and recommended labs and tests       Follow up with primary care provider, Physician No Ref-Primary, within 7   days for hospital follow- up.  The following labs/tests are recommended:   CBC, BMP, INR in 1 week.             Unresulted Labs Ordered in the Past 30 Days of this Admission     Date and Time Order Name Status Description    5/4/2020 0021 Blood culture Preliminary     5/4/2020 0021 Blood culture Preliminary       These results will be followed up by me.  On 5/5/2020 patient was referred to [facility] admission for evaluation of Staphylococcus aureus bacterial endocarditis.    Discharge Disposition   Discharged to home  Condition at discharge: Stable  Patient was discharged home on 5/4/2020.  Overnight called with positive Staphylococcus aureus blood cultures.  Patient returned to our ED, where blood cultures were drawn, patient was evaluated, and transferred by family to [facility] under the care of Dr. Maravilla, hospitalist and Dr. valencia cardiology.    Hospital Course   Jcarlos Maravilla is a 24 year old male with PMH of IVDU/heroin, injecting 0.5 g of heroin daily, admitted on 5/3/2020. He is admitted with fever.  Temperature up to 101.2.  No pulmonary infiltrates, urinary symptoms.  COVID-19 tested negative. IV tracks in right antecubital area, no purulent phlebitis exam.  US negative for DVT, showed occlusive right medial vein thrombophlebitis.  Patient was treated with vancomycin and Zosyn.  He defervesced  throughout the day.  Stable vitals.  Transthoracic echo showed normal LV function no valvular abnormalities, no findings suspicious for endocarditis.  Patient was discharged home with prescription for Augmentin and Xarelto for occlusive thrombophlebitis.  He does not have medical insurance, thus is at retail pharmacy in the morning to obtain medications who is available coupons.  Mild signs of opiate withdrawal, diaphoresis.  Patient was given 10 mg of.  Overnight, after patient is discharged, call received about blood cultures 5/3/2020 growing Staphylococcus aureus, 1/2 bottles.  Patient was contacted, his grandmother answered.  Patient was instructed to present stat to ED for evaluation, obtaining set of blood cultures.  I spoke to cardiology and hospitalist at [facility], recommended further evaluation for bacterial endocarditis/SKYE offered in our facility.  From our ED, if patient is hemodynamically stable, plan for the pt to be transferred by family to [facility]. He is accepted  to room 510, unit 55/Dr.   Consultations This Hospital Stay   PHARMACY TO DOSE VANCO  SOCIAL WORK IP CONSULT    Code Status   Full Code    Time Spent on this Encounter   I, Mary Uribe MD, personally saw the patient today and spent greater than 30 minutes discharging this patient.       Mary Uribe MD  [facility]  ______________________________________________________________________    Physical Exam   Vital Signs: Temp: 98.1  F (36.7  C) Temp src: Axillary BP: 120/78 Pulse: 73 Heart Rate: 75 Resp: 18 SpO2: 100 % O2 Device: None (Room air)    Weight: 220 lbs 0 oz  Alert and oriented #3, in no apparent distress, no signs of opiate withdrawal  HEENT: Pupils equal and reactive to light and accommodation, extraocular motions, moist mucous membranes  Heart was rhythmic and regular S1, S2, no murmurs lungs clear to auscultation, good air movement bilaterally soft, nontender,  nondistended, audible bowel sounds  No visible nephropathy, skin rash  Right antecubital area with indurated tender vein without warmth; tracks from IV injections       Primary Care Physician   Physician No Ref-Primary    Discharge Orders      Reason for your hospital stay    Thrombophlebitis     Follow-up and recommended labs and tests     Follow up with primary care provider, Physician No Ref-Primary, within 7 days for hospital follow- up.  The following labs/tests are recommended: CBC, BMP, INR in 1 week.     Activity    Your activity upon discharge: activity as tolerated     Full Code     Diet    Follow this diet upon discharge: Orders Placed This Encounter      Room Service      Combination Diet Regular Diet Adult       Significant Results and Procedures   Most Recent 3 CBC's:  Recent Labs   Lab Test 05/04/20  0551 05/04/20  0139   WBC 4.4 6.0   HGB 10.3* 11.6*   MCV 57* 58*     190 197     Most Recent 3 BMP's:  Recent Labs   Lab Test 05/04/20  0551 05/04/20  0139    137   POTASSIUM 3.9 3.9   CHLORIDE 109 107   CO2 23 24   BUN 9 10   CR 0.99  0.97 1.24   ANIONGAP 6 6   MAGALI 7.5* 7.9*   GLC 86 88     Most Recent 2 LFT's:No lab results found.  Most Recent 3 INR's:No lab results found.  Most Recent D-dimer:No lab results found.  Most Recent TSH and T4:No lab results found.  Most Recent 6 glucoses:  Recent Labs   Lab Test 05/04/20  0551 05/04/20  0139   GLC 86 88     Most Recent ESR & CRP:  Recent Labs   Lab Test 05/04/20  0551   CRP 30.9*   ,   Results for orders placed or performed during the hospital encounter of 05/03/20   XR Chest Port 1 View    Narrative    EXAM: XR CHEST PORT 1 VW  LOCATION: [facility]  DATE/TIME: 5/4/2020 1:30 AM    INDICATION: Fever.  COMPARISON: None.      Impression    IMPRESSION: Negative chest.   US Upper Extremity Venous Duplex Bilat    Narrative    ULTRASOUND UPPER EXTREMITY VENOUS DUPLEX BILATERAL   5/4/2020 9:14 AM     HISTORY: History of IV drug abuse;  fever with unknown source. Rule out  upper extremity infective thrombophlebitis.    COMPARISON: None available    FINDINGS: Gray-scale, color and Doppler spectral analysis ultrasound  was performed of the bilateral upper extremities. Compression and  augmentation imaging was performed.    No evidence of deep venous thrombosis in either upper extremity. No  flow is visualized in the right median cubital vein, adjacent to the  area of lump, consistent with occlusive superficial thrombophlebitis.      Impression    IMPRESSION:   1. No evidence of deep venous thrombosis in either upper extremity.  2. Superficial thrombophlebitis of the right median cubital vein.    LORA JENSEN MD   Echocardiogram Complete    Narrative    [id number]  [id number]  [id number]  [id number]^[facility]^RAHEEM           [facility]  Echocardiography Laboratory  [address] [place] [address]        Name: RUDY VIVAS  MRN: [id number]  : 1995  Study Date: 2020 02:25 PM  Age: 24 yrs  Gender: Male  Patient Location: [facility]  Reason For Study: Fever  Ordering Physician: RAHEEM MCKENNA  Performed By: Teresa Clarke RDCS     BSA: 2.2 m2  Height: 72 in  Weight: 218 lb  HR: 71  BP: 100/54 mmHg  _____________________________________________________________________________  __        Procedure  Complete Echo Adult.  _____________________________________________________________________________  __        Interpretation Summary     Left ventricular size, global systolic function, and wall motion are normal,  estimated LVEF 60-65%.  Right ventricular global function is normal.  No significant valvular abnormalities.  Dilation of the inferior vena cava is present with abnormal respiratory  variation in diameter.     There are no prior studies available for comparison.  _____________________________________________________________________________  __        Left Ventricle  The left ventricle is normal in size.  There is normal left ventricular wall  thickness. Left ventricular systolic function is normal. The visual ejection  fraction is estimated at 60-65%. Left ventricular diastolic function is  normal. No regional wall motion abnormalities noted.     Right Ventricle  The right ventricle is normal in structure, function and size.     Atria  Normal left atrial size. Right atrial size is normal.     Mitral Valve  The mitral valve is normal in structure and function.        Tricuspid Valve  The tricuspid valve is not well visualized, but is grossly normal. There is  trace tricuspid regurgitation.     Aortic Valve  The aortic valve is normal in structure and function.     Pulmonic Valve  The pulmonic valve is not well seen, but is grossly normal.     Vessels  The aortic root is normal size. Normal size ascending aorta. Dilation of the  inferior vena cava is present with abnormal respiratory variation in diameter.     Pericardium  There is no pericardial effusion.     _____________________________________________________________________________  __  MMode/2D Measurements & Calculations  IVSd: 1.0 cm  LVIDd: 5.6 cm  LVIDs: 4.4 cm  LVPWd: 1.1 cm  FS: 22.7 %     LV mass(C)d: 236.0 grams  LV mass(C)dI: 106.8 grams/m2  Ao root diam: 3.2 cm  LA dimension: 4.3 cm  asc Aorta Diam: 3.0 cm  LA/Ao: 1.4  LA Volume (BP): 70.9 ml  LA Volume Index (BP): 32.1 ml/m2  RWT: 0.39        Doppler Measurements & Calculations  MV E max june: 57.4 cm/sec     MV dec slope: 202.3 cm/sec2  MV dec time: 0.29 sec  PA acc time: 0.17 sec  TR max june: 130.3 cm/sec  TR max P.8 mmHg  E/E' av.0  Lateral E/e': 4.9  Medial E/e': 5.1           _____________________________________________________________________________  __           Report approved by: Carlos Cloon 2020 03:36 PM            Discharge Medications   Current Discharge Medication List      START taking these medications    Details   amoxicillin-clavulanate (AUGMENTIN) 875-125 MG tablet  Take 1 tablet by mouth every 12 hours for 7 days  Qty: 14 tablet, Refills: 0    Associated Diagnoses: Thrombophlebitis arm      loperamide (IMODIUM) 2 MG capsule Take 1 capsule (2 mg) by mouth 4 times daily as needed for diarrhea  Qty: 20 capsule, Refills: 0    Associated Diagnoses: Heroin addiction (H)      rivaroxaban ANTICOAGULANT (XARELTO) 10 MG TABS tablet Take 1 tablet (10 mg) by mouth daily (with dinner)  Qty: 45 tablet, Refills: 0    Associated Diagnoses: Thrombophlebitis arm         CONTINUE these medications which have NOT CHANGED    Details   ibuprofen (ADVIL/MOTRIN) 600 MG tablet Take 600 mg by mouth every 6 hours as needed for moderate pain         STOP taking these medications       acetaminophen-codeine (TYLENOL #3) 300-30 MG tablet Comments:   Reason for Stopping:             Allergies   Allergies   Allergen Reactions     No Known Drug Allergies

## 2020-05-04 NOTE — ED PROVIDER NOTES
[facility] ED Provider Note   Patient: Jcarlos Maravilla  MRN #:  [id number]  Date of Visit: May 4, 2020    CC:     Chief Complaint   Patient presents with     Addiction Problem     HPI:  Jcarlos Maravilla is a 24 year old male who presented to the emergency department by foot requesting help for his heroin addiction.  Patient states that he was using with a friend at around 4-5 PM tonight.  His friend's father broke into his friend's house, and was yelling at him and demanding him to leave.  Police stopped him this evening on the street and told him that his friend that overdose and that he should come to the emergency department to be seen.  Patient states that he has been using heroin on a daily basis.  He started using heroin 2 years ago, and prefers to use intravenous injections.  He uses this with other friends as well.  He had an accidental overdose at the end of March.  He states that he is trying to get on Suboxone or trying to get into a treatment program but it is extremely difficult.  He asked if we prescribed Suboxone.  He has depression but has not had suicidal ideation or plan recently.  He does have some passive thoughts at times.  His father, Shane, was a known drug user and  of an overdose.  His mother  of a pontine stroke.  Patient has a sister, Eli, and another brother.  Patient denies any other drug use except for marijuana.  Patient was noted to have a fever upon arrival.    Problem List:  There are no active problems to display for this patient.      History reviewed. No pertinent past medical history.    MEDS: acetaminophen-codeine (TYLENOL #3) 300-30 MG tablet  ibuprofen (ADVIL/MOTRIN) 600 MG tablet        ALLERGIES:    Allergies   Allergen Reactions     No Known Drug Allergies        History reviewed. No pertinent surgical history.    Social History     Tobacco Use     Smoking status: Current Some Day Smoker      Smokeless tobacco: Never Used     Tobacco comment: second hand exposure   Substance Use Topics     Alcohol use: Yes     Comment: occasional      Drug use: Yes     Frequency: 7.0 times per week     Types: Marijuana, Opiates     Comment: reports injecting 1-3.5grams of heroin daily         Review of Systems   Except as noted in HPI, all other systems were reviewed and are negative    Physical Exam     Vitals were reviewed  Patient Vitals for the past 12 hrs:   BP Temp Temp src Pulse Heart Rate Resp SpO2 Weight   05/04/20 0300 111/72 -- -- 108 113 25 95 % --   05/04/20 0245 108/70 -- -- 107 105 14 97 % --   05/04/20 0230 115/68 -- -- 111 111 19 96 % --   05/04/20 0215 121/76 -- -- 109 109 20 96 % --   05/04/20 0200 108/75 -- -- 105 103 18 98 % --   05/04/20 0145 110/79 -- -- 107 105 19 97 % --   05/04/20 0140 -- -- -- -- -- -- -- 99.2 kg (218 lb 9.6 oz)   05/04/20 0130 113/62 -- -- 110 112 21 99 % --   05/04/20 0115 113/63 -- -- 110 110 16 98 % --   05/04/20 0112 -- 99.7  F (37.6  C) Oral -- -- -- -- --   05/04/20 0100 110/68 -- -- 115 120 12 95 % --   05/04/20 0045 110/63 -- -- 114 112 21 97 % --   05/04/20 0040 113/71 -- -- 112 115 17 97 % --   05/04/20 0025 110/61 -- -- 116 123 10 98 % --   05/04/20 0001 116/76 101.4  F (38.6  C) Oral -- 138 16 97 % --     GENERAL APPEARANCE: Patient is somnolent,  FACE: normal facies  EYES: Pupils are pinpoint  HENT: normal external exam  NECK: no adenopathy or asymmetry  RESP: normal respiratory effort; clear breath sounds bilaterally  CV: regular rate and rhythm; no appreciable murmurs, gallops or rubs  ABD: soft, no tenderness; no rebound or guarding; bowel sounds are normal  EXT: Needle marks in both antecubital fossa, right side worse than left.  There is some evidence for local phlebitis with palpable cordlike vessels.  No signs of lymphangitic streaking  SKIN: As above  NEURO: no facial droop; no focal deficits, speech is mumbled  PSYCH: Flat affect      Available  Lab/Imaging Results     Results for orders placed or performed during the hospital encounter of 05/03/20 (from the past 24 hour(s))   Lactic acid whole blood   Result Value Ref Range    Lactic Acid 2.3 (H) 0.7 - 2.0 mmol/L   XR Chest Port 1 View    Narrative    EXAM: XR CHEST PORT 1 VW  LOCATION: [facility]  DATE/TIME: 5/4/2020 1:30 AM    INDICATION: Fever.  COMPARISON: None.      Impression    IMPRESSION: Negative chest.   Basic metabolic panel   Result Value Ref Range    Sodium 137 133 - 144 mmol/L    Potassium 3.9 3.4 - 5.3 mmol/L    Chloride 107 94 - 109 mmol/L    Carbon Dioxide 24 20 - 32 mmol/L    Anion Gap 6 3 - 14 mmol/L    Glucose 88 70 - 99 mg/dL    Urea Nitrogen 10 7 - 30 mg/dL    Creatinine 1.24 0.66 - 1.25 mg/dL    GFR Estimate 82 >60 mL/min/[1.73_m2]    GFR Estimate If Black >90 >60 mL/min/[1.73_m2]    Calcium 7.9 (L) 8.5 - 10.1 mg/dL   CBC with platelets differential   Result Value Ref Range    WBC 6.0 4.0 - 11.0 10e9/L    RBC Count 6.73 (H) 4.4 - 5.9 10e12/L    Hemoglobin 11.6 (L) 13.3 - 17.7 g/dL    Hematocrit 39.0 (L) 40.0 - 53.0 %    MCV 58 (L) 78 - 100 fl    MCH 17.2 (L) 26.5 - 33.0 pg    MCHC 29.7 (L) 31.5 - 36.5 g/dL    RDW 18.1 (H) 10.0 - 15.0 %    Platelet Count 197 150 - 450 10e9/L    Diff Method Automated Method     % Neutrophils 67.3 %    % Lymphocytes 22.4 %    % Monocytes 9.0 %    % Eosinophils 0.3 %    % Basophils 0.7 %    % Immature Granulocytes 0.3 %    Nucleated RBCs 0 0 /100    Absolute Neutrophil 4.1 1.6 - 8.3 10e9/L    Absolute Lymphocytes 1.4 0.8 - 5.3 10e9/L    Absolute Monocytes 0.5 0.0 - 1.3 10e9/L    Absolute Basophils 0.0 0.0 - 0.2 10e9/L    Abs Immature Granulocytes 0.0 0 - 0.4 10e9/L    Absolute Nucleated RBC 0.0    CRP inflammation   Result Value Ref Range    CRP Inflammation 33.2 (H) 0.0 - 8.0 mg/L         The Lactic acid level is elevated due to Recent IV drug use versus early sepsis., at this time there is no sign of severe sepsis or septic  shock.      Impression     Final diagnoses:   Heroin addiction (H)   Fever   Bacterial sepsis (H)   IV drug abuse (H)         ED Course & Medical Decision Making   Jcarlos Maravilla is a 24 year old male who presented to the emergency department with concerns for chronic continuous heroin abuse.  Patient was using IV heroin tonight with a friend who ended up with an overdose and had to be seen in the emergency department.  Patient was told to come to the emergency department for further evaluation.  Upon arrival, we noted that the patient's temperature is 101.4 with a heart rate of 138.  Blood pressure is 116/76, respiratory rate of 16 with 97% oxygen saturation.  Patient was very somnolent from his presumed recent IV heroin use.  Lungs are clear, cardiovascular exam revealed rapid heart rate but no appreciable murmur.  Abdomen was soft and nontender.  Extremities reveal track marks from his IV drug abuse in both arms, right side worse than left.  There is superficial phlebitis.  There is no obvious cellulitis.  Laboratory work-up reveals a white blood count of 6.0, hemoglobin of 11.6, platelet count of 197.  There is no significant left shift.  Basic metabolic panel was normal except for low calcium level of 7.9.  CRP is elevated at 33.2.  Lactic acid level was elevated at 2.3.  Patient received 2 L of normal saline.  Chest x-ray revealed no definite infiltrates.  Patient was started on empiric IV antibiotics with Zosyn and vancomycin for suspected bacterial sepsis.  Patient is at high risk for infectious complications from his IV drug abuse.    2:48 AM: Patient remains somnolent.  Blood pressure remained stable.  Heart rate is down slightly to 103.  Oxygen saturations remain between 97-98%.  COVID-19 testing was obtained at this time due to this possibility.    3:19 AM: Transition orders have been written.  We will order a echocardiogram to rule out infective endocarditis.  Reassess in the morning to determine  whether upper extremity ultrasound would be warranted to rule out infective thrombophlebitis.       ED to Inpatient Handoff:    Discussed with Dr. Garcia at 0310 hr  Patient accepted for Inpatient Stay  Pending studies include blood cultures, echocardiogram  Code Status: Full Code           Disclaimer: This note consists of words and symbols derived from keyboarding and dictation using voice recognition software.  As a result, there may be errors that have gone undetected.  Please consider this when interpreting information found in this note.       Brea Cesar MD  05/04/20 [id number]

## 2020-05-04 NOTE — H&P
[facility]    History and Physical - Hospitalist Service       Date of Admission:  5/3/2020    Assessment & Plan   Jcarlos Maravilla is a 24 year old male admitted on 5/3/2020. He is admitted with fever ,sepsis    Principal Problem:    Fever, unspecified fever cause    Assessment: may be from Heroin injection sites but they dont look infected. He is stable and answered all questions. His lactae improved but he started to spike fevers again    Plan: he already recieved Zosyn and vancomycin, will continue them. Ordered ECHO. Ordered IV fluids, ordered COVID -19 because of fevers and risks. It is pending, patient is in isolation.  Active Problems:    Bacterial sepsis (H)    Assessment: no clear source    Plan: ordered Zosyn, vancomycin, blood cultures pending    Heroin addiction (H)    Assessment: patient is stable now no withdrawal symptoms    Plan: Ordered IV morphine but if he starts to have withdrawal probably needs to change it to methadone.  He needs addiction treatment.   Ordered  consult         Diet: Advance Diet as Tolerated: Clear Liquid Diet    DVT Prophylaxis: Enoxaparin (Lovenox) SQ  Ocok Catheter: not present  Code Status: FULL    Disposition Plan   Expected discharge: 2 - 3 days, recommended to prior living arrangement once safe disposition plan/ TCU bed available.  Entered: Lois Garcia MD 05/04/2020, 5:31 AM     The patient's care was discussed with the Patient.    Lois Garcia MD  [facility]    ______________________________________________________________________    Chief Complaint   Heroin use    History is obtained from the patient    History of Present Illness   Jcarlos Maravilla is a 24 year old male who presented to the ER with help[ for heroine addiction.  Patient states that he was using with a friend at around 4-5 PM last night.  His friend's father broke into his friend's house, and was yelling at him  and demanding him to leave.  Police stopped him this evening on the street and told him that his friend that overdose and that he should come to the emergency department to be seen.  Patient states that he has been using heroin on a daily basis.  He started using heroin 2 years ago, and prefers to use intravenous injections.  He uses this with other friends as well.  He had an accidental overdose at the end of March. He uses around 0.5 gm every day for last 2 years. He states that he is trying to get on Suboxone or trying to get into a treatment program but it is extremely difficult.  He told me that he [facility] in [facility] [place].   He has depression but has not had suicidal ideation or plan recently.  He does have some passive thoughts at times.  His father, Shane, was a known drug user and  of an overdose.  His mother  of a pontine stroke.  Patient has a sister, Eli, and another brother.  Patient denies any other drug use except for marijuana.  Patient was noted to have a fever upon arrival.  In ER he had elevated temp and tachycardia. He was given IV fluids. Blood work showed normal except elevated CRP and lactate.  So patient is getting admitted for fever, sepsis - unknown source other than IV drug use but no cellulitis noted near the sites.      Review of Systems    The 10 point Review of Systems is negative other than noted in the HPI or here.     Past Medical History    I have reviewed this patient's medical history and updated it with pertinent information if needed.   History reviewed. No pertinent past medical history.    Past Surgical History   I have reviewed this patient's surgical history and updated it with pertinent information if needed.  History reviewed. No pertinent surgical history.    Social History   I have reviewed this patient's social history and updated it with pertinent information if needed.  Social History     Tobacco Use     Smoking status: Current  Some Day Smoker     Smokeless tobacco: Never Used     Tobacco comment: second hand exposure   Substance Use Topics     Alcohol use: Yes     Comment: occasional      Drug use: Yes     Frequency: 7.0 times per week     Types: Marijuana, Opiates     Comment: reports injecting 1-3.5grams of heroin daily       Family History   I have reviewed this patient's family history and updated it with pertinent information if needed.   History reviewed. No pertinent family history.    Prior to Admission Medications   Prior to Admission Medications   Prescriptions Last Dose Informant Patient Reported? Taking?   acetaminophen-codeine (TYLENOL #3) 300-30 MG tablet Unknown at Unknown time  No No   Sig: Take 1 tablet by mouth every 6 hours as needed for severe pain   ibuprofen (ADVIL/MOTRIN) 600 MG tablet Unknown at Unknown time  Yes No   Sig: Take 600 mg by mouth every 6 hours as needed for moderate pain      Facility-Administered Medications: None     Allergies   Allergies   Allergen Reactions     No Known Drug Allergies        Physical Exam   Vital Signs: Temp: 101.2  F (38.4  C) Temp src: Oral BP: 104/48 Pulse: 111 Heart Rate: 116 Resp: 18 SpO2: 96 % O2 Device: None (Room air)    Weight: 220 lbs 0 oz    Constitutional: awake, alert, cooperative, no apparent distress, and appears stated age  Eyes: Lids and lashes normal, pupils equal, round and reactive to light, extra ocular muscles intact, sclera clear, conjunctiva normal  ENT: Normocephalic, without obvious abnormality, atraumatic, sinuses nontender on palpation, external ears without lesions, oral pharynx with moist mucous membranes, tonsils without erythema or exudates, gums normal and good dentition.  Respiratory: No increased work of breathing, good air exchange, clear to auscultation bilaterally, no crackles or wheezing  Cardiovascular: Normal apical impulse, regular rate and rhythm, normal S1 and S2, no S3 or S4, and no murmur noted  GI: No scars, normal bowel sounds, soft,  non-distended, non-tender, no masses palpated, no hepatosplenomegally  Skin: there are IV injecting marks in both elbow regions more on right, there is firm mass like tissues around the sites but non tender looks like chronic thrombophlebitis. No erythema or tenderness, warmth noted.  Neurologic: Awake, alert, oriented to name, place and time.  Cranial nerves II-XII are grossly intact.  Motor is 5 out of 5 bilaterally.  Cerebellar finger to nose, heel to shin intact.  Sensory is intact.  Babinski down going, Romberg negative, and gait is normal.    Data   Data reviewed today: I reviewed all medications, new labs and imaging results over the last 24 hours..    Recent Labs   Lab 05/04/20  0139   WBC 6.0   HGB 11.6*   MCV 58*         POTASSIUM 3.9   CHLORIDE 107   CO2 24   BUN 10   CR 1.24   ANIONGAP 6   MAGALI 7.9*   GLC 88     Recent Results (from the past 24 hour(s))   XR Chest Port 1 View    Narrative    EXAM: XR CHEST PORT 1 VW  LOCATION: [facility]  DATE/TIME: 5/4/2020 1:30 AM    INDICATION: Fever.  COMPARISON: None.      Impression    IMPRESSION: Negative chest.

## 2020-05-04 NOTE — PROGRESS NOTES
Writer verified with lab patient COVID test came back negative @ 1400 5/4/2020.  Provider contacted of the negative results.

## 2020-05-04 NOTE — CONSULTS
Pharmacy Vancomycin Initial Note  Date of Service May 4, 2020  Patient's  1995  24 year old, male    Indication: Sepsis    Current estimated CrCl = Estimated Creatinine Clearance: 114.3 mL/min (based on SCr of 1.24 mg/dL).    Creatinine for last 3 days  2020:  1:39 AM Creatinine 1.24 mg/dL    Recent Vancomycin Level(s) for last 3 days  No results found for requested labs within last 72 hours.      Vancomycin IV Administrations (past 72 hours)                   vancomycin (VANCOCIN) 2,000 mg in sodium chloride 0.9 % 500 mL intermittent infusion (mg) 2,000 mg New Bag 20 0224                Nephrotoxins and other renal medications (From now, onward)    Start     Dose/Rate Route Frequency Ordered Stop    20 1430  vancomycin (VANCOCIN) 2,000 mg in sodium chloride 0.9 % 500 mL intermittent infusion      2,000 mg  over 2 Hours Intravenous EVERY 12 HOURS 20 0523      20 0800  piperacillin-tazobactam (ZOSYN) intermittent infusion 4.5 g      4.5 g  over 30 Minutes Intravenous EVERY 6 HOURS 20 0500            Contrast Orders - past 72 hours (72h ago, onward)    None                Plan:  1.  Started patient on vancomycin  2000 mg IV q12h.   2.  Goal Trough Level: 15-20 mg/L   3.  Pharmacy will check trough levels as appropriate in 1-3 Days.    4. Serum creatinine levels will be ordered daily for the first week of therapy and at least twice weekly for subsequent weeks.    5. Bena method utilized to dose vancomycin therapy: Method 2    Rodri Sharma RPH

## 2020-05-05 ENCOUNTER — HOSPITAL ENCOUNTER (INPATIENT)
Facility: CLINIC | Age: 25
LOS: 29 days | Discharge: HOME | End: 2020-06-03
Attending: HOSPITALIST | Admitting: INTERNAL MEDICINE
Payer: MEDICAID

## 2020-05-05 ENCOUNTER — HOSPITAL ENCOUNTER (EMERGENCY)
Facility: CLINIC | Age: 25
Discharge: HOME | End: 2020-05-05
Attending: FAMILY MEDICINE | Admitting: FAMILY MEDICINE
Payer: MEDICAID

## 2020-05-05 VITALS
RESPIRATION RATE: 14 BRPM | DIASTOLIC BLOOD PRESSURE: 83 MMHG | SYSTOLIC BLOOD PRESSURE: 136 MMHG | OXYGEN SATURATION: 99 % | TEMPERATURE: 97.2 F

## 2020-05-05 DIAGNOSIS — I80.8 SUPERFICIAL THROMBOPHLEBITIS OF RIGHT UPPER EXTREMITY: ICD-10-CM

## 2020-05-05 DIAGNOSIS — R78.81 STAPHYLOCOCCUS AUREUS BACTEREMIA: ICD-10-CM

## 2020-05-05 DIAGNOSIS — F11.20 HEROIN ADDICTION (H): ICD-10-CM

## 2020-05-05 DIAGNOSIS — B95.61 STAPHYLOCOCCUS AUREUS BACTEREMIA: ICD-10-CM

## 2020-05-05 DIAGNOSIS — F11.229 OPIOID DEPENDENCE WITH INTOXICATION WITH COMPLICATION (H): Primary | ICD-10-CM

## 2020-05-05 LAB
ANION GAP SERPL CALCULATED.3IONS-SCNC: 5 MMOL/L (ref 3–14)
BASOPHILS # BLD AUTO: 0 10E9/L (ref 0–0.2)
BASOPHILS # BLD AUTO: 0 10E9/L (ref 0–0.2)
BASOPHILS NFR BLD AUTO: 0.5 %
BASOPHILS NFR BLD AUTO: 0.7 %
BUN SERPL-MCNC: 3 MG/DL (ref 7–30)
CALCIUM SERPL-MCNC: 8.5 MG/DL (ref 8.5–10.1)
CHLORIDE SERPL-SCNC: 108 MMOL/L (ref 94–109)
CO2 SERPL-SCNC: 24 MMOL/L (ref 20–32)
CREAT SERPL-MCNC: 0.64 MG/DL (ref 0.66–1.25)
CRP SERPL-MCNC: 32.3 MG/L (ref 0–8)
DIFFERENTIAL METHOD BLD: ABNORMAL
DIFFERENTIAL METHOD BLD: ABNORMAL
EOSINOPHIL # BLD AUTO: 0.1 10E9/L (ref 0–0.7)
EOSINOPHIL NFR BLD AUTO: 1.9 %
EOSINOPHIL NFR BLD AUTO: 5.5 %
ERYTHROCYTE [DISTWIDTH] IN BLOOD BY AUTOMATED COUNT: 15.4 % (ref 10–15)
ERYTHROCYTE [DISTWIDTH] IN BLOOD BY AUTOMATED COUNT: 17.8 % (ref 10–15)
GFR SERPL CREATININE-BSD FRML MDRD: >90 ML/MIN/{1.73_M2}
GLUCOSE SERPL-MCNC: 95 MG/DL (ref 70–99)
HCT VFR BLD AUTO: 35.1 % (ref 40–53)
HCT VFR BLD AUTO: 35.2 % (ref 40–53)
HGB BLD-MCNC: 10.7 G/DL (ref 13.3–17.7)
HGB BLD-MCNC: 11 G/DL (ref 13.3–17.7)
IMM GRANULOCYTES # BLD: 0 10E9/L (ref 0–0.4)
IMM GRANULOCYTES # BLD: 0 10E9/L (ref 0–0.4)
IMM GRANULOCYTES NFR BLD: 0 %
IMM GRANULOCYTES NFR BLD: 0.2 %
LYMPHOCYTES # BLD AUTO: 1.6 10E9/L (ref 0.8–5.3)
LYMPHOCYTES # BLD AUTO: 1.8 10E9/L (ref 0.8–5.3)
LYMPHOCYTES NFR BLD AUTO: 34.9 %
LYMPHOCYTES NFR BLD AUTO: 41.9 %
MCH RBC QN AUTO: 17.2 PG (ref 26.5–33)
MCH RBC QN AUTO: 17.2 PG (ref 26.5–33)
MCHC RBC AUTO-ENTMCNC: 30.4 G/DL (ref 31.5–36.5)
MCHC RBC AUTO-ENTMCNC: 31.3 G/DL (ref 31.5–36.5)
MCV RBC AUTO: 55 FL (ref 78–100)
MCV RBC AUTO: 57 FL (ref 78–100)
MONOCYTES # BLD AUTO: 0.4 10E9/L (ref 0–1.3)
MONOCYTES # BLD AUTO: 0.5 10E9/L (ref 0–1.3)
MONOCYTES NFR BLD AUTO: 11.4 %
MONOCYTES NFR BLD AUTO: 8.8 %
NEUTROPHILS # BLD AUTO: 2 10E9/L (ref 1.6–8.3)
NEUTROPHILS # BLD AUTO: 2.2 10E9/L (ref 1.6–8.3)
NEUTROPHILS NFR BLD AUTO: 46.9 %
NEUTROPHILS NFR BLD AUTO: 47.3 %
NRBC # BLD AUTO: 0 10*3/UL
NRBC # BLD AUTO: 0 10*3/UL
NRBC BLD AUTO-RTO: 0 /100
NRBC BLD AUTO-RTO: 0 /100
PLATELET # BLD AUTO: 210 10E9/L (ref 150–450)
PLATELET # BLD AUTO: 227 10E9/L (ref 150–450)
POTASSIUM SERPL-SCNC: 3.7 MMOL/L (ref 3.4–5.3)
PROCALCITONIN SERPL-MCNC: 0.57 NG/ML
RBC # BLD AUTO: 6.21 10E12/L (ref 4.4–5.9)
RBC # BLD AUTO: 6.38 10E12/L (ref 4.4–5.9)
RETICS # AUTO: 102.7 10E9/L (ref 25–95)
RETICS/RBC NFR AUTO: 1.6 % (ref 0.5–2)
SODIUM SERPL-SCNC: 137 MMOL/L (ref 133–144)
WBC # BLD AUTO: 4.2 10E9/L (ref 4–11)
WBC # BLD AUTO: 4.6 10E9/L (ref 4–11)

## 2020-05-05 PROCEDURE — 85025 COMPLETE CBC W/AUTO DIFF WBC: CPT | Performed by: NURSE PRACTITIONER

## 2020-05-05 PROCEDURE — 86140 C-REACTIVE PROTEIN: CPT | Performed by: FAMILY MEDICINE

## 2020-05-05 PROCEDURE — 12000000 ZZH R&B MED SURG/OB

## 2020-05-05 PROCEDURE — 84145 PROCALCITONIN (PCT): CPT | Performed by: FAMILY MEDICINE

## 2020-05-05 PROCEDURE — 36415 COLL VENOUS BLD VENIPUNCTURE: CPT | Performed by: NURSE PRACTITIONER

## 2020-05-05 PROCEDURE — 85045 AUTOMATED RETICULOCYTE COUNT: CPT | Performed by: NURSE PRACTITIONER

## 2020-05-05 PROCEDURE — 25000132 ZZH RX MED GY IP 250 OP 250 PS 637: Performed by: NURSE PRACTITIONER

## 2020-05-05 PROCEDURE — 25000128 H RX IP 250 OP 636: Performed by: FAMILY MEDICINE

## 2020-05-05 PROCEDURE — 25000128 H RX IP 250 OP 636: Performed by: NURSE PRACTITIONER

## 2020-05-05 PROCEDURE — 85025 COMPLETE CBC W/AUTO DIFF WBC: CPT | Performed by: FAMILY MEDICINE

## 2020-05-05 PROCEDURE — 99284 EMERGENCY DEPT VISIT MOD MDM: CPT | Mod: 25 | Performed by: FAMILY MEDICINE

## 2020-05-05 PROCEDURE — 87040 BLOOD CULTURE FOR BACTERIA: CPT | Performed by: FAMILY MEDICINE

## 2020-05-05 PROCEDURE — 99223 1ST HOSP IP/OBS HIGH 75: CPT | Mod: AI | Performed by: NURSE PRACTITIONER

## 2020-05-05 PROCEDURE — 80048 BASIC METABOLIC PNL TOTAL CA: CPT | Performed by: FAMILY MEDICINE

## 2020-05-05 PROCEDURE — 36415 COLL VENOUS BLD VENIPUNCTURE: CPT | Performed by: FAMILY MEDICINE

## 2020-05-05 PROCEDURE — 40000847 ZZHCL STATISTIC MORPHOLOGY W/INTERP HISTOLOGY TC 85060: Performed by: NURSE PRACTITIONER

## 2020-05-05 PROCEDURE — 85060 BLOOD SMEAR INTERPRETATION: CPT | Performed by: NURSE PRACTITIONER

## 2020-05-05 PROCEDURE — 25800030 ZZH RX IP 258 OP 636: Performed by: FAMILY MEDICINE

## 2020-05-05 PROCEDURE — 99284 EMERGENCY DEPT VISIT MOD MDM: CPT | Mod: Z6 | Performed by: FAMILY MEDICINE

## 2020-05-05 RX ORDER — ONDANSETRON 4 MG/1
4 TABLET, ORALLY DISINTEGRATING ORAL EVERY 6 HOURS PRN
Status: DISCONTINUED | OUTPATIENT
Start: 2020-05-05 | End: 2020-06-03 | Stop reason: HOSPADM

## 2020-05-05 RX ORDER — LIDOCAINE 40 MG/G
CREAM TOPICAL
Status: DISCONTINUED | OUTPATIENT
Start: 2020-05-05 | End: 2020-06-03 | Stop reason: HOSPADM

## 2020-05-05 RX ORDER — AMOXICILLIN 250 MG
2 CAPSULE ORAL 2 TIMES DAILY PRN
Status: DISCONTINUED | OUTPATIENT
Start: 2020-05-05 | End: 2020-06-03 | Stop reason: HOSPADM

## 2020-05-05 RX ORDER — AMOXICILLIN 250 MG
1 CAPSULE ORAL 2 TIMES DAILY PRN
Status: DISCONTINUED | OUTPATIENT
Start: 2020-05-05 | End: 2020-06-03 | Stop reason: HOSPADM

## 2020-05-05 RX ORDER — ONDANSETRON 2 MG/ML
4 INJECTION INTRAMUSCULAR; INTRAVENOUS EVERY 6 HOURS PRN
Status: DISCONTINUED | OUTPATIENT
Start: 2020-05-05 | End: 2020-06-03 | Stop reason: HOSPADM

## 2020-05-05 RX ORDER — ACETAMINOPHEN 325 MG/1
650 TABLET ORAL EVERY 4 HOURS PRN
Status: DISCONTINUED | OUTPATIENT
Start: 2020-05-05 | End: 2020-06-03 | Stop reason: HOSPADM

## 2020-05-05 RX ORDER — ACETAMINOPHEN 650 MG/1
650 SUPPOSITORY RECTAL EVERY 4 HOURS PRN
Status: DISCONTINUED | OUTPATIENT
Start: 2020-05-05 | End: 2020-06-03 | Stop reason: HOSPADM

## 2020-05-05 RX ORDER — PROCHLORPERAZINE MALEATE 5 MG
10 TABLET ORAL EVERY 6 HOURS PRN
Status: DISCONTINUED | OUTPATIENT
Start: 2020-05-05 | End: 2020-06-03 | Stop reason: HOSPADM

## 2020-05-05 RX ORDER — NICOTINE 21 MG/24HR
1 PATCH, TRANSDERMAL 24 HOURS TRANSDERMAL DAILY
Status: DISCONTINUED | OUTPATIENT
Start: 2020-05-05 | End: 2020-05-06

## 2020-05-05 RX ORDER — CEFAZOLIN SODIUM 2 G/100ML
2 INJECTION, SOLUTION INTRAVENOUS EVERY 8 HOURS
Status: DISCONTINUED | OUTPATIENT
Start: 2020-05-05 | End: 2020-05-14

## 2020-05-05 RX ORDER — NALOXONE HYDROCHLORIDE 0.4 MG/ML
.1-.4 INJECTION, SOLUTION INTRAMUSCULAR; INTRAVENOUS; SUBCUTANEOUS
Status: DISCONTINUED | OUTPATIENT
Start: 2020-05-05 | End: 2020-06-03 | Stop reason: HOSPADM

## 2020-05-05 RX ORDER — POLYETHYLENE GLYCOL 3350 17 G/17G
17 POWDER, FOR SOLUTION ORAL DAILY PRN
Status: DISCONTINUED | OUTPATIENT
Start: 2020-05-05 | End: 2020-06-03 | Stop reason: HOSPADM

## 2020-05-05 RX ORDER — PROCHLORPERAZINE 25 MG
25 SUPPOSITORY, RECTAL RECTAL EVERY 12 HOURS PRN
Status: DISCONTINUED | OUTPATIENT
Start: 2020-05-05 | End: 2020-06-03 | Stop reason: HOSPADM

## 2020-05-05 RX ADMIN — NICOTINE 1 PATCH: 21 PATCH TRANSDERMAL at 16:47

## 2020-05-05 RX ADMIN — Medication 1 MG: at 23:32

## 2020-05-05 RX ADMIN — CEFAZOLIN SODIUM 2 G: 2 INJECTION, SOLUTION INTRAVENOUS at 16:47

## 2020-05-05 RX ADMIN — CEFAZOLIN SODIUM 2 G: 2 INJECTION, SOLUTION INTRAVENOUS at 23:32

## 2020-05-05 RX ADMIN — VANCOMYCIN HYDROCHLORIDE 2000 MG: 1 INJECTION, POWDER, LYOPHILIZED, FOR SOLUTION INTRAVENOUS at 11:12

## 2020-05-05 ASSESSMENT — ACTIVITIES OF DAILY LIVING (ADL)
TOILETING: 0-->INDEPENDENT
RETIRED_COMMUNICATION: 0-->UNDERSTANDS/COMMUNICATES WITHOUT DIFFICULTY
FALL_HISTORY_WITHIN_LAST_SIX_MONTHS: NO
BATHING: 0-->INDEPENDENT
ADLS_ACUITY_SCORE: 10
SWALLOWING: 0-->SWALLOWS FOODS/LIQUIDS WITHOUT DIFFICULTY
RETIRED_EATING: 0-->INDEPENDENT
DRESS: 0-->INDEPENDENT
ADLS_ACUITY_SCORE: 10
TRANSFERRING: 0-->INDEPENDENT
AMBULATION: 0-->INDEPENDENT
COGNITION: 0 - NO COGNITION ISSUES REPORTED

## 2020-05-05 NOTE — PLAN OF CARE
Direct admit from [facility] arrived at approx 1515.  A&OX4.  Up independently.  Denies pain.  VSS.  IV Ancef every 8 hours.  NPO at midnight for possible SKYE tomorrow.  Cardiology, Infectious Disease and Psych consult for tomorrow.

## 2020-05-05 NOTE — ED PROVIDER NOTES
History     Chief Complaint   Patient presents with     Blood Infection     HPI  Jcarlos Maravilla is a 24 year old male who presents to the emergency department after being told by the hospitalist service to come back and as he needs to be transferred down to [facility].  Patient was just hospitalized yesterday because of an infected injection site on his right arm.  Patient was treated with IV antibiotics and then sent home on oral antibiotics.  Overnight, patient's blood cultures grew out what appears to be staph aureus.  The hospitalist service contacted cardiology down at [facility] and they recommend that the patient needs to come back to the hospital and be transferred down to [facility] to have a transesophageal echo done.  Currently patient does feel somewhat better.  He is not having any new symptoms.  Denies any new fevers or chills.  Denies any chest pain or shortness of breath.  Is not having any increasing pain with his arm and has not noticed any new redness.    Allergies:  Allergies   Allergen Reactions     No Known Drug Allergies        Problem List:    Patient Active Problem List    Diagnosis Date Noted     Fever 05/04/2020     Priority: Medium     Bacterial sepsis (H) 05/04/2020     Priority: Medium     Heroin addiction (H) 05/04/2020     Priority: Medium     IV drug abuse (H) 05/04/2020     Priority: Medium     Fever, unspecified fever cause 05/04/2020     Priority: Medium     Fever of unknown origin 05/04/2020     Priority: Medium     Thrombophlebitis arm 05/04/2020     Priority: Medium        Past Medical History:    History reviewed. No pertinent past medical history.    Past Surgical History:    History reviewed. No pertinent surgical history.    Family History:    No family history on file.    Social History:  Marital Status:  Single [1]  Social History     Tobacco Use     Smoking status: Current Some Day Smoker     Smokeless tobacco: Never Used     Tobacco comment: second  hand exposure   Substance Use Topics     Alcohol use: Yes     Comment: occasional      Drug use: Yes     Frequency: 7.0 times per week     Types: Marijuana, Opiates     Comment: reports injecting 1-3.5grams of heroin daily        Medications:    amoxicillin-clavulanate (AUGMENTIN) 875-125 MG tablet  apixaban ANTICOAGULANT (ELIQUIS STARTER PACK) 5 MG tablet  ibuprofen (ADVIL/MOTRIN) 600 MG tablet  loperamide (IMODIUM) 2 MG capsule          Review of Systems   All other systems reviewed and are negative.      Physical Exam   BP: 136/83  Heart Rate: 86  Temp: 97.2  F (36.2  C)  Resp: 14  SpO2: 99 %      Physical Exam  Vitals signs and nursing note reviewed.   Constitutional:       General: He is not in acute distress.     Appearance: He is well-developed. He is not diaphoretic.   HENT:      Head: Normocephalic and atraumatic.      Nose: Nose normal.      Mouth/Throat:      Pharynx: No oropharyngeal exudate.   Eyes:      General: No scleral icterus.     Conjunctiva/sclera: Conjunctivae normal.      Pupils: Pupils are equal, round, and reactive to light.   Neck:      Musculoskeletal: Normal range of motion.   Cardiovascular:      Rate and Rhythm: Normal rate and regular rhythm.      Heart sounds: Normal heart sounds. No murmur. No friction rub.   Pulmonary:      Effort: Pulmonary effort is normal. No respiratory distress.      Breath sounds: Normal breath sounds. No wheezing or rales.   Abdominal:      General: Bowel sounds are normal. There is no distension.      Palpations: Abdomen is soft. There is no mass.      Tenderness: There is no abdominal tenderness. There is no guarding or rebound.   Musculoskeletal: Normal range of motion.         General: No tenderness.      Comments: There is localized swelling in the right antecubital fossa, slightly tender to palpation.  There is no redness noted.   Skin:     General: Skin is warm.      Findings: No rash.   Neurological:      Mental Status: He is alert and oriented to  person, place, and time.   Psychiatric:         Judgment: Judgment normal.         ED Course        Procedures    Results for orders placed or performed during the hospital encounter of 05/05/20   CBC with platelets differential     Status: Abnormal   Result Value Ref Range    WBC 4.6 4.0 - 11.0 10e9/L    RBC Count 6.21 (H) 4.4 - 5.9 10e12/L    Hemoglobin 10.7 (L) 13.3 - 17.7 g/dL    Hematocrit 35.2 (L) 40.0 - 53.0 %    MCV 57 (L) 78 - 100 fl    MCH 17.2 (L) 26.5 - 33.0 pg    MCHC 30.4 (L) 31.5 - 36.5 g/dL    RDW 17.8 (H) 10.0 - 15.0 %    Platelet Count 210 150 - 450 10e9/L    Diff Method Automated Method     % Neutrophils 47.3 %    % Lymphocytes 34.9 %    % Monocytes 11.4 %    % Eosinophils 5.5 %    % Basophils 0.7 %    % Immature Granulocytes 0.2 %    Nucleated RBCs 0 0 /100    Absolute Neutrophil 2.2 1.6 - 8.3 10e9/L    Absolute Lymphocytes 1.6 0.8 - 5.3 10e9/L    Absolute Monocytes 0.5 0.0 - 1.3 10e9/L    Absolute Basophils 0.0 0.0 - 0.2 10e9/L    Abs Immature Granulocytes 0.0 0 - 0.4 10e9/L    Absolute Nucleated RBC 0.0    Basic metabolic panel     Status: Abnormal   Result Value Ref Range    Sodium 137 133 - 144 mmol/L    Potassium 3.7 3.4 - 5.3 mmol/L    Chloride 108 94 - 109 mmol/L    Carbon Dioxide 24 20 - 32 mmol/L    Anion Gap 5 3 - 14 mmol/L    Glucose 95 70 - 99 mg/dL    Urea Nitrogen 3 (L) 7 - 30 mg/dL    Creatinine 0.64 (L) 0.66 - 1.25 mg/dL    GFR Estimate >90 >60 mL/min/[1.73_m2]    GFR Estimate If Black >90 >60 mL/min/[1.73_m2]    Calcium 8.5 8.5 - 10.1 mg/dL   CRP inflammation     Status: Abnormal   Result Value Ref Range    CRP Inflammation 32.3 (H) 0.0 - 8.0 mg/L     Medications   vancomycin (VANCOCIN) 2,000 mg in sodium chloride 0.9 % 500 mL intermittent infusion (2,000 mg Intravenous New Bag 5/5/20 1112)     Labs are reviewed and patient continues to have an elevated CRP.  Patient was set up to be a direct admission by our hospitalist service here down to [facility] to have a SKYE.  Patient  should have went to [facility] directly but ended up coming here.  He is stable and otherwise nontoxic-appearing.  I did update the accepting hospitalist at [facility], Dr. Silva.  He will still accept the patient, did recommend giving a dose of vancomycin since we did get 2 more blood cultures and patient can be shipped down.  Patient will be coming by private car as patient is stable I think this is reasonable.    Assessments & Plan (with Medical Decision Making)  Staph aureus bacteremia, thrombophlebitis     I have reviewed the nursing notes.    I have reviewed the findings, diagnosis, plan and need for follow up with the patient.          Final diagnoses:   None       5/5/2020   [facility] EMERGENCY DEPARTMENT     Tay Berry MD  05/05/20 [id number]

## 2020-05-05 NOTE — H&P
[facility]    History and Physical - Hospitalist Service       Date of Admission:  5/5/2020    Assessment & Plan   Jcarlos Maravilla is a 24 year old male with a past medical history of IV heroin use admitted on 5/5/2020 with MSSA bacteremia. He presented to [facility] on 5/3/2020 following encouragement from police officers to seek evaluation following use of heroin with his friend overdosing. The patient presented to [facility] where he was found to have a fever on arrival and was admitted for evaluation for sepsis. He received IV antibiotics (zosyn and vancomycin), ECHO completed, RUE ultrasound, and COVID tested negative. He was discharged home on 5/4/2020 with prescriptions for augmentin and Eliquis for superficial occlusion thrombophlebitis. Patient's blood cultures from 5/4 came back positive, 1/2 blood cultures grew Staphylococcus aureus. He returned to [facility] for repeat blood culture and transferred to [facility] for ID consultation and work up for endocarditis.    MSSA Bacteremia  Hx of IV heroin use for 2 years.  He presented to [facility] on 5/4/2020 with fever and sepsis. On 5/4, Temp 101.4, LA 2.3-->0.5, Procalcitonin 1.22, CRP 30.9, WBC 4.4  He received IV antibiotics (zosyn and vancomycin), ECHO, and RUE ultrasound completed on 5/4/2020 and was discharged home on Augmentin and Eliquis for superficial occlusive thrombophlebitis.   5/4/2020 CXR clear and COVID testing negative.  Patient's positive blood culture, 1/2 blood cultures grew Staphylococcus aureus. He returned to [facility] for repeat blood culture and transferred to [facility] for ID consultation and work up for endocarditis.  -Telemetry  -Cardiology consultation for possible infectious endocarditis, ? SKYE  -ID consult   -Blood cultures pending  -Ancef 2g IV q8hr  -Daily weight    Superficial Occlusive Thrombophlebitis of right median cubital vein  Right arm has scabbed  areas with track marks. ~3cm area of swelling, only mildly raised to the antecubital area.  Right Upper Extremity US completed 2020 no evidence of DVT  Received one dose of Eliquis 10mg PO on 2020 (plan for anticoagulation Eliquis 10mg BID for 7 days then 5mg BID for 23 days). He reports he did not fill his prescription on discharge.  Discharge from [facility] on Augmentin and Eliquis  -Hold Eliquis, pending SKYE, reevaluate need for anticoagulation on 2020    Heroin Use, Current IV use  Substance Abuse  Patient reports using IV heroin use daily. He started using heroin 2 years ago and prefers IV injections. He admits to using around 1-2gm daily. He had an accidental overdose in March requiring 12mg of narcan.   Patients father was a known drug user and  of an overdose.  He reportedly is working on getting enrolled in [employer] in [place] but has been waiting due to lack of insurance.  Patient has expressed interest in suboxone   -Psych consult for management of withdrawal    Microcytic Anemia  No obvious signs of blood loss.   -Peripheral smear, iron studies, ferritin in AM  -Monitor for signs of bleeding  -CBC in AM    Nicotine Dependence  Patient reports using 1.5 PPD   -Nicotine patch 21mg/24hr       Diet: Regular diet, NPO at midnight  DVT Prophylaxis: Pneumatic Compression Devices  Cook Catheter: not present  Code Status: Full    Disposition Plan   Expected discharge: >2 midnights for bacteremia workup and evaluation by cardiology and ID.  Entered: TOYA Jason CNP 2020, 8:47 AM     The patient's care was discussed with the Attending Physician, Dr. Guerrero.    TOYA Jason CNP  [facility]    ______________________________________________________________________    Chief Complaint   Positive blood work    History is obtained from the patient and electronic health record    History of Present Illness   Jcarlos Maravilla is a 24 year old male  "with a past medical history of IV heroin use admitted on (Not on file) with MSSA bacteremia.He presented to [facility] on 5/3/2020 following encouragement from police officers to seek evaluation following use of heroin with his friend overdosing. The patient presented to [facility] where he was found to have a fever on arrival and was admitted for evaluation for sepsis. He received IV antibiotics (zosyn and vancomycin), ECHO completed, RUE ultrasound, and COVID tested negative. He was discharged home on 2020 with prescriptions for augmentin and Eliquis for superficial occlusion thrombophlebitis. Patient's blood cultures from  came back positive, 1/2 blood cultures grew Staphylococcus aureus. He returned to [facility] for repeat blood culture and transferred to [facility] for ID consultation and work up for endocarditis.  Patient reports using IV heroin use daily. He started using heroin 2 years ago and prefers IV injections. He admits to using around 1-2gm daily. He had an accidental overdose in March requiring 12mg of narcan. The patient does admit to reusing needles but not sharing needles with others. The patient's father was a known IV drug user and  of an overdose. He is reportedly enrolled in [employer] in [place] and has expressed interest in suboxone.  Patient does endorse feeling mild anxiety, mild diaphoresis, and occasional diarrhea which he attributes to the beginning of heroin withdrawal. He states he has been through withdrawal previously and \"knows what to expect\". He reports his last use of heroin was the nights of 5/3. The patient denies any chills, sore throat, headache, shortness of breath, chest pain, palpations, lightheadedness, dizziness, numbness, tingling, nausea, or vomiting.    Review of Systems    The 10 point Review of Systems is negative other than noted in the HPI or here.     Past Medical History    I have reviewed this patient's " medical history and updated it with pertinent information if needed.   No past medical history on file.    Past Surgical History   I have reviewed this patient's surgical history and updated it with pertinent information if needed.  No past surgical history on file.    Social History   I have reviewed this patient's social history and updated it with pertinent information if needed.  Social History     Tobacco Use     Smoking status: Current Some Day Smoker     Packs/day: 1.50     Smokeless tobacco: Never Used     Tobacco comment: second hand exposure   Substance Use Topics     Alcohol use: Yes     Comment: occasional      Drug use: Yes     Frequency: 7.0 times per week     Types: Marijuana, Opiates     Comment: reports injecting 1-3.5grams of heroin daily       Family History   I have reviewed this patient's family history and updated it with pertinent information if needed.   Family History   Problem Relation Age of Onset     Cerebrovascular Disease Mother      Diabetes Maternal Grandfather      Asthma Maternal Grandfather        Prior to Admission Medications   Cannot display prior to admission medications because the patient has not been admitted in this contact.     Allergies   Allergies   Allergen Reactions     No Known Drug Allergies        Physical Exam   Vital Signs: Temp: 98.1  F (36.7  C) Temp src: Oral BP: 132/89 Pulse: 101   Resp: 16 SpO2: 100 % O2 Device: None (Room air)    Weight: 0 lbs 0 oz    Constitutional: Awake, alert, cooperative, no apparent distress.  Eyes: Conjunctiva and pupils examined and normal.  HEENT: Moist mucous membranes, normal dentition.  Respiratory: Clear to auscultation bilaterally, no crackles or wheezing.  Cardiovascular: Regular rate and rhythm, normal S1 and S2, and no murmur noted.  GI: Soft, non-distended, non-tender, normal bowel sounds.  Skin: Right arm has scabbed areas with track marked. ~3cm area of swelling, only mildly raised to the antecubital area. Left  antecubital track marks. No rashes, no cyanosis, no edema.  Musculoskeletal: No joint swelling, erythema or tenderness.  Neurologic: Cranial nerves 2-12 intact, normal strength and sensation.  Psychiatric: Alert, oriented to person, place and time, no obvious anxiety or depression.    Data   Data reviewed today: I reviewed all medications, new labs and imaging results over the last 24 hours. I personally reviewed no images or EKG's today.    Recent Labs   Lab 05/05/20  1043 05/04/20  0551 05/04/20  0139   WBC 4.6 4.4 6.0   HGB 10.7* 10.3* 11.6*   MCV 57* 57* 58*    189  190 197    138 137   POTASSIUM 3.7 3.9 3.9   CHLORIDE 108 109 107   CO2 24 23 24   BUN 3* 9 10   CR 0.64* 0.99  0.97 1.24   ANIONGAP 5 6 6   MAGALI 8.5 7.5* 7.9*   GLC 95 86 88     No results found for this or any previous visit (from the past 24 hour(s)).

## 2020-05-05 NOTE — ED NOTES
Call from Nuris GONZALEZ at [facility]-Pt not on schedule for Transesophageal Echo today. OK to eat and drink prior to transfer.

## 2020-05-05 NOTE — PROGRESS NOTES
S-(situation): Patient discharged to home via private vehicle with with friends.    B-(background): bacterial sepsis    A-(assessment): /78   Pulse 73   Temp 98.1  F (36.7  C) (Axillary)   Resp 18   Ht 1.829 m (6')   Wt 99.8 kg (220 lb)   SpO2 100%   BMI 29.84 kg/m    Alert and oriented no reports of pain, VSS, and afebrile. Verbalized understanding of all medications. Verbalized understanding of needing to come in tomorrow for medications  and for social work to discuss payment methods. Pt refused nurse to make f/u appt. Pt stated would make appt on own, phone number to make appt. Was provided and shown to pt with understanding of number given and needing to have f/u appt. With in 7 days of this hospitalization. Phone numbers for social work to call, Grandmother name of Darline [phone], Pt cell phone number [phone].    R-(recommendations): Discharge instructions reviewed with patient. Listed belongings gathered and returned to patient.          Discharge Nursing Criteria:     Care Plan and Patient education resolved: Yes    New Medications- pt has been educated about purpose and side effects: Yes    Vaccines  Influenza status verified at discharge:  n/a    MISC  Prescriptions if needed, hard copies sent with patient  NA  Home and hospital aquired medications returned to patient: NA  Medication Bin checked and emptied on discharge Yes  Patient reports post-discharge pain management plan is effective: Yes

## 2020-05-05 NOTE — SIGNIFICANT EVENT
Significant Event Note    Time of event: 7:00 AM May 5, 2020    Description of event:  Sigh out from night hospitalist: Call received from the lab regarding patient's positive blood culture, 1/2 blood cultures grew Staphylococcus aureus.  Patient was changed last night, with stable condition, to home with grandmother.  He is IV drug user, hearing.  Was admitted for febrile illness superficial thrombophlebitis of right median cubital vein.  Augmentin Xarelto prescribed at discharge.    Plan:  I called patient's grandmother, who is listed as his primary contact, with instructions to return to emergency room for evaluation and obtain a second set of blood cultures.  I placed a call to on-call cardiologist Aimee to discuss the case, possible transfer to [facility] for SKYE, which is not offered here, and long-term IV antibiotics. Awaiting call back.  Grandmother Darline Maravilla stated that she will bring Jcarlos to ER around 10 AM.  I spoke with ED physician Dr. Berry, and ED registration staff.    I spoke to Dr. Hayden, [facility] cardiology, who recommended further evaluation for bacterial endocarditis and transferring patient to their facility.  I spoke with Dr. Maravilla, [facility] hospitalist, excepting hospitalist.  Patient excepted to unit 55, bed 510.  I gave this information to Dr. Berry and I called patient's grandmother with this information.  Last spoken to patient's grandmother 10:15 AM, stated that Jcarlos is on the way to [facility] ER with his parents.    Mary Uribe MD

## 2020-05-05 NOTE — ED TRIAGE NOTES
Pt discharged yesterday from hospital following admission from alleged over dose. Blood culture results final following discharge-pt returns at this time for transfer to [facility] for EEG and treatment

## 2020-05-05 NOTE — LETTER
Name: Jcarlos Maravilla  : 1995  MRN #: [id number]    [facility]  Hospitalist Progress Note  Praful Trinidad MD    Physician    Hospitalist    Progress Notes    Signed    Date of Service:  2020  8:47 AM    Creation Time:  2020  8:47 AM           When I evaluated patient: 2020     Assessment & Plan         Jcarlos Maravilla is a 24 year old male with a past medical history including IV heroin, who was initially hospitalization at [facility] 5/3- with febrile illness associated with IV heroin usage. However, after discharge, blood cultures became positive for MSSA and pt was subsequently directly admitted to [facility] 2020 for further workup including SKYE.     MSSA bacteremia, suspect related to IV drug use.  * Initially presented to [facility] evening 5/3/2020 following encouragement from police officers to seek evaluation following use of heroin with his friend, who overdosed. On initial evaluation there, was febrile, with signs of sepsis with elevated lactic acid, procalcitonin 1.22. Subsequently admitted and treated with broad antibiotics. COVID-19 PCR  negative. TTE  negative for vegetation. Sepsis resolved and pt was discharged  on Augmentin (for thrombophlebitis). However, on , blood cultures from 5/3 came back positive for MSSA and pt sent to [facility] ED for repeat BC's and subsequently directly admitted to [facility] .   * Started on cefazolin on admit . SKYE  negative for vegetations. ID consulted. BC's from ,  and  NGTD.  - Continue cefazolin (started ) - plan 4 weeks of IV antibiotics (stop 2020); will likely remain hospitalized here to complete the course unless accepting TCU can be found (unlikely).  -Cefazolin was stopped and vancomycin was started due to the rash and is planning day 1428 of antibiotic  - has peripheral line in place  - Monitor cultures.  Negative so far     Superficial occlusive thrombophlebitis of right  median cubital vein.  * Diagnosed by US at [facility] . Was started on Eliquis at [facility]  and discharged on Augmentin. Did not fill scripts yet after discharge.  * Thrombophlebitis improved despite not continuing Eliquis after recent discharge. Improved after admission off anticoagulation.  - Continue off anticoagulation.  - Monitor site of thrombophlebitis, if worsens, could consider restarting AC.  - On antibiotics as above.     IVDU - heroin.  * Patient reported using IV heroin use daily. He started using heroin 2 years ago and prefers IV injections. He admits to using around 1-2 gm daily. He had an accidental overdose 3/2020 requiring 12 mg of narcan. Patients father was a known drug user and  of an overdose. He reportedly was working on getting enrolled in [employer] in [place] but had been waiting due to lack of insurance.   * On admit, patient expressed interest in Suboxone. Seen by Psychiatry consulted  and started on Suboxone.  - Psych reconsulted on - currently on Subaxone 1 film TID, plan continue at discharge, consult Psychiatry prior to discharge to order medication at discharge, appreciate help    - Clonidine changed from prn to 0.1 mg po at bedtime   - Continue PRN loperamide.  - Follow-up CD treatment after discharge.     Anemia, suspect chronic disease.  Pt anemic 10-11 range noted during recent hospitalization. No obvious signs of blood loss. Hgb 11.0 on admit . 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   - Monitor CBC.  - Consider prbc transfusion if hgb </= 7.0 or if significant bleeding with hemodynamic instability or if symptomatic.  -His peripheral smear is suggesting a possible hemoglobinopathy, hgb is improving, would reassess after infection is treated as outpatient.      Nicotine dependence  Patient reported smoking 1.5 PPD. Started on nicotine patch.  - Continue nicotine 21 mg patch.     Drug rash  - noted  to have a rash on his forearms bilateral and left sided chest, the rash is itchy  - suspected from IV abx  - cortisone cream TID prn, Benadryl po prn  - abx change to vancomycin with improvement      Diet: Regular Diet Adult    Prophylaxis: PCD's, ambulation.   Cook Catheter: not present  Code Status: Full Code       Disposition Plan     Expected discharge: after 6/2/2020, recommended to prior living arrangement after completion of IV antibiotics (home not an option given IV drug use; TCU's unlikely to accept pt given IV drug use).        Interval History      -- No acute events overnight, no complaints.      -Data reviewed today: I reviewed all new labs  over the last 24 hours. I personally reviewed no images or EKG's today.        Physical Exam     Heart Rate: 90, Blood pressure 122/71, pulse 92, temperature 98.5  F (36.9  C), temperature source Oral, resp. rate 16, weight 103.9 kg (229 lb), SpO2 97 %.        Vitals:     05/25/20 0649 05/26/20 0557 05/27/20 0611   Weight: 103.4 kg (228 lb) 102.9 kg (226 lb 12.8 oz) 103.9 kg (229 lb)     Vital Signs with Ranges  Temp:  [98  F (36.7  C)-98.5  F (36.9  C)] 98.5  F (36.9  C)  Pulse:  [85-92] 92  Heart Rate:  [90] 90  Resp:  [14-16] 16  BP: (115-124)/(71-77) 122/71  SpO2:  [96 %-97 %] 97 %  I/O's Last 24 hours  I/O last 3 completed shifts:  In: 940 [P.O.:940]  Out: -      Constitutional: No apparent distress  Respiratory: CTA b/l. Normal work of breathing  Cardiovascular: Regular rate and rhythm, normal S1 and S2, and no murmur noted  GI: Normal bowel sounds, soft, non-distended, non-tender  Skin/Integumen:  no cyanosis, no edema  Other:   Normal mood and affect

## 2020-05-06 ENCOUNTER — APPOINTMENT (OUTPATIENT)
Dept: CARDIOLOGY | Facility: CLINIC | Age: 25
End: 2020-05-06
Attending: INTERNAL MEDICINE
Payer: MEDICAID

## 2020-05-06 LAB
ALBUMIN SERPL-MCNC: 3.3 G/DL (ref 3.4–5)
ALP SERPL-CCNC: 66 U/L (ref 40–150)
ALT SERPL W P-5'-P-CCNC: 22 U/L (ref 0–70)
ANION GAP SERPL CALCULATED.3IONS-SCNC: 7 MMOL/L (ref 3–14)
AST SERPL W P-5'-P-CCNC: 13 U/L (ref 0–45)
BILIRUB SERPL-MCNC: 0.4 MG/DL (ref 0.2–1.3)
BUN SERPL-MCNC: 4 MG/DL (ref 7–30)
CALCIUM SERPL-MCNC: 9.1 MG/DL (ref 8.5–10.1)
CHLORIDE SERPL-SCNC: 112 MMOL/L (ref 94–109)
CO2 SERPL-SCNC: 23 MMOL/L (ref 20–32)
CREAT SERPL-MCNC: 0.68 MG/DL (ref 0.66–1.25)
ERYTHROCYTE [DISTWIDTH] IN BLOOD BY AUTOMATED COUNT: 15.4 % (ref 10–15)
FERRITIN SERPL-MCNC: 585 NG/ML (ref 26–388)
GFR SERPL CREATININE-BSD FRML MDRD: >90 ML/MIN/{1.73_M2}
GLUCOSE SERPL-MCNC: 95 MG/DL (ref 70–99)
HCT VFR BLD AUTO: 36.3 % (ref 40–53)
HGB BLD-MCNC: 11.4 G/DL (ref 13.3–17.7)
IRON SATN MFR SERPL: 67 % (ref 15–46)
IRON SERPL-MCNC: 132 UG/DL (ref 35–180)
MCH RBC QN AUTO: 17.2 PG (ref 26.5–33)
MCHC RBC AUTO-ENTMCNC: 31.4 G/DL (ref 31.5–36.5)
MCV RBC AUTO: 55 FL (ref 78–100)
PLATELET # BLD AUTO: 233 10E9/L (ref 150–450)
POTASSIUM SERPL-SCNC: 3.6 MMOL/L (ref 3.4–5.3)
PROT SERPL-MCNC: 7.5 G/DL (ref 6.8–8.8)
RBC # BLD AUTO: 6.63 10E12/L (ref 4.4–5.9)
SODIUM SERPL-SCNC: 142 MMOL/L (ref 133–144)
TIBC SERPL-MCNC: 196 UG/DL (ref 240–430)
WBC # BLD AUTO: 4.2 10E9/L (ref 4–11)

## 2020-05-06 PROCEDURE — 36415 COLL VENOUS BLD VENIPUNCTURE: CPT | Performed by: NURSE PRACTITIONER

## 2020-05-06 PROCEDURE — 80053 COMPREHEN METABOLIC PANEL: CPT | Performed by: NURSE PRACTITIONER

## 2020-05-06 PROCEDURE — 93320 DOPPLER ECHO COMPLETE: CPT

## 2020-05-06 PROCEDURE — 25000132 ZZH RX MED GY IP 250 OP 250 PS 637: Performed by: NURSE PRACTITIONER

## 2020-05-06 PROCEDURE — 40000235 ZZH STATISTIC TELEMETRY

## 2020-05-06 PROCEDURE — 93325 DOPPLER ECHO COLOR FLOW MAPG: CPT | Mod: 26 | Performed by: INTERNAL MEDICINE

## 2020-05-06 PROCEDURE — 93320 DOPPLER ECHO COMPLETE: CPT | Mod: 26 | Performed by: INTERNAL MEDICINE

## 2020-05-06 PROCEDURE — 83540 ASSAY OF IRON: CPT | Performed by: NURSE PRACTITIONER

## 2020-05-06 PROCEDURE — 25000128 H RX IP 250 OP 636: Performed by: INTERNAL MEDICINE

## 2020-05-06 PROCEDURE — 40000007 ZZH STATISTIC ADULT CD FACE TO FACE-NO CHRG

## 2020-05-06 PROCEDURE — 36415 COLL VENOUS BLD VENIPUNCTURE: CPT | Performed by: INTERNAL MEDICINE

## 2020-05-06 PROCEDURE — 85027 COMPLETE CBC AUTOMATED: CPT | Performed by: NURSE PRACTITIONER

## 2020-05-06 PROCEDURE — 83550 IRON BINDING TEST: CPT | Performed by: NURSE PRACTITIONER

## 2020-05-06 PROCEDURE — 99152 MOD SED SAME PHYS/QHP 5/>YRS: CPT | Performed by: INTERNAL MEDICINE

## 2020-05-06 PROCEDURE — 25000132 ZZH RX MED GY IP 250 OP 250 PS 637: Performed by: PSYCHIATRY & NEUROLOGY

## 2020-05-06 PROCEDURE — 99222 1ST HOSP IP/OBS MODERATE 55: CPT | Mod: 25 | Performed by: INTERNAL MEDICINE

## 2020-05-06 PROCEDURE — 25800030 ZZH RX IP 258 OP 636: Performed by: INTERNAL MEDICINE

## 2020-05-06 PROCEDURE — 25000132 ZZH RX MED GY IP 250 OP 250 PS 637: Performed by: INTERNAL MEDICINE

## 2020-05-06 PROCEDURE — 12000000 ZZH R&B MED SURG/OB

## 2020-05-06 PROCEDURE — 99232 SBSQ HOSP IP/OBS MODERATE 35: CPT | Performed by: INTERNAL MEDICINE

## 2020-05-06 PROCEDURE — 25000128 H RX IP 250 OP 636: Performed by: NURSE PRACTITIONER

## 2020-05-06 PROCEDURE — 25000125 ZZHC RX 250: Performed by: INTERNAL MEDICINE

## 2020-05-06 PROCEDURE — 99223 1ST HOSP IP/OBS HIGH 75: CPT | Performed by: PSYCHIATRY & NEUROLOGY

## 2020-05-06 PROCEDURE — 40000857 ZZH STATISTIC TEE INCLUDES SEDATION

## 2020-05-06 PROCEDURE — 93312 ECHO TRANSESOPHAGEAL: CPT | Mod: 26 | Performed by: INTERNAL MEDICINE

## 2020-05-06 PROCEDURE — 87040 BLOOD CULTURE FOR BACTERIA: CPT | Performed by: INTERNAL MEDICINE

## 2020-05-06 PROCEDURE — 82728 ASSAY OF FERRITIN: CPT | Performed by: NURSE PRACTITIONER

## 2020-05-06 RX ORDER — LIDOCAINE HYDROCHLORIDE 40 MG/ML
1.5 SOLUTION TOPICAL ONCE
Status: COMPLETED | OUTPATIENT
Start: 2020-05-06 | End: 2020-05-06

## 2020-05-06 RX ORDER — FENTANYL CITRATE 50 UG/ML
25 INJECTION, SOLUTION INTRAMUSCULAR; INTRAVENOUS
Status: DISCONTINUED | OUTPATIENT
Start: 2020-05-06 | End: 2020-05-06

## 2020-05-06 RX ORDER — BUPRENORPHINE AND NALOXONE 4; 1 MG/1; MG/1
1 FILM, SOLUBLE BUCCAL; SUBLINGUAL 2 TIMES DAILY
Status: DISCONTINUED | OUTPATIENT
Start: 2020-05-06 | End: 2020-05-09

## 2020-05-06 RX ORDER — CLONIDINE HYDROCHLORIDE 0.1 MG/1
0.1 TABLET ORAL 4 TIMES DAILY PRN
Status: DISCONTINUED | OUTPATIENT
Start: 2020-05-06 | End: 2020-05-09

## 2020-05-06 RX ORDER — GLYCOPYRROLATE 0.2 MG/ML
0.1 INJECTION, SOLUTION INTRAMUSCULAR; INTRAVENOUS ONCE
Status: COMPLETED | OUTPATIENT
Start: 2020-05-06 | End: 2020-05-06

## 2020-05-06 RX ORDER — LIDOCAINE 50 MG/G
OINTMENT TOPICAL ONCE
Status: COMPLETED | OUTPATIENT
Start: 2020-05-06 | End: 2020-05-06

## 2020-05-06 RX ORDER — FLUMAZENIL 0.1 MG/ML
0.2 INJECTION, SOLUTION INTRAVENOUS
Status: DISCONTINUED | OUTPATIENT
Start: 2020-05-06 | End: 2020-05-06

## 2020-05-06 RX ORDER — NALOXONE HYDROCHLORIDE 0.4 MG/ML
.1-.4 INJECTION, SOLUTION INTRAMUSCULAR; INTRAVENOUS; SUBCUTANEOUS
Status: DISCONTINUED | OUTPATIENT
Start: 2020-05-06 | End: 2020-05-06

## 2020-05-06 RX ORDER — DEXTROSE MONOHYDRATE 25 G/50ML
9.5 INJECTION, SOLUTION INTRAVENOUS
Status: DISCONTINUED | OUTPATIENT
Start: 2020-05-06 | End: 2020-05-06

## 2020-05-06 RX ORDER — LOPERAMIDE HCL 2 MG
2 CAPSULE ORAL 4 TIMES DAILY PRN
Status: DISCONTINUED | OUTPATIENT
Start: 2020-05-06 | End: 2020-06-03 | Stop reason: HOSPADM

## 2020-05-06 RX ORDER — NICOTINE 21 MG/24HR
1 PATCH, TRANSDERMAL 24 HOURS TRANSDERMAL DAILY
Status: DISCONTINUED | OUTPATIENT
Start: 2020-05-06 | End: 2020-06-03 | Stop reason: HOSPADM

## 2020-05-06 RX ORDER — SODIUM CHLORIDE 9 MG/ML
INJECTION, SOLUTION INTRAVENOUS CONTINUOUS PRN
Status: DISCONTINUED | OUTPATIENT
Start: 2020-05-06 | End: 2020-05-06

## 2020-05-06 RX ORDER — FENTANYL CITRATE 50 UG/ML
50 INJECTION, SOLUTION INTRAMUSCULAR; INTRAVENOUS ONCE
Status: DISCONTINUED | OUTPATIENT
Start: 2020-05-06 | End: 2020-05-06

## 2020-05-06 RX ORDER — LIDOCAINE 40 MG/G
CREAM TOPICAL
Status: DISCONTINUED | OUTPATIENT
Start: 2020-05-06 | End: 2020-05-06

## 2020-05-06 RX ADMIN — FENTANYL CITRATE 25 MCG: 50 INJECTION, SOLUTION INTRAMUSCULAR; INTRAVENOUS at 15:04

## 2020-05-06 RX ADMIN — ACETAMINOPHEN 650 MG: 325 TABLET, FILM COATED ORAL at 01:48

## 2020-05-06 RX ADMIN — NICOTINE 1 PATCH: 21 PATCH TRANSDERMAL at 07:58

## 2020-05-06 RX ADMIN — GLYCOPYRROLATE 0.1 MG: 0.2 INJECTION, SOLUTION INTRAMUSCULAR; INTRAVENOUS at 14:02

## 2020-05-06 RX ADMIN — FENTANYL CITRATE 1 MCG: 50 INJECTION, SOLUTION INTRAMUSCULAR; INTRAVENOUS at 14:59

## 2020-05-06 RX ADMIN — FENTANYL CITRATE 25 MCG: 50 INJECTION, SOLUTION INTRAMUSCULAR; INTRAVENOUS at 15:18

## 2020-05-06 RX ADMIN — BUPRENORPHINE HYDROCHLORIDE, NALOXONE HYDROCHLORIDE 1 FILM: 4; 1 FILM, SOLUBLE BUCCAL; SUBLINGUAL at 10:16

## 2020-05-06 RX ADMIN — MIDAZOLAM HYDROCHLORIDE 1 MG: 1 INJECTION, SOLUTION INTRAMUSCULAR; INTRAVENOUS at 15:18

## 2020-05-06 RX ADMIN — Medication 1 MG: at 23:37

## 2020-05-06 RX ADMIN — MIDAZOLAM HYDROCHLORIDE 2 MG: 1 INJECTION, SOLUTION INTRAMUSCULAR; INTRAVENOUS at 15:12

## 2020-05-06 RX ADMIN — CEFAZOLIN SODIUM 2 G: 2 INJECTION, SOLUTION INTRAVENOUS at 23:37

## 2020-05-06 RX ADMIN — MIDAZOLAM HYDROCHLORIDE 1 MG: 1 INJECTION, SOLUTION INTRAMUSCULAR; INTRAVENOUS at 15:24

## 2020-05-06 RX ADMIN — MIDAZOLAM HYDROCHLORIDE 1 MG: 1 INJECTION, SOLUTION INTRAMUSCULAR; INTRAVENOUS at 14:59

## 2020-05-06 RX ADMIN — MIDAZOLAM HYDROCHLORIDE 1 MG: 1 INJECTION, SOLUTION INTRAMUSCULAR; INTRAVENOUS at 15:04

## 2020-05-06 RX ADMIN — MIDAZOLAM HYDROCHLORIDE 2 MG: 1 INJECTION, SOLUTION INTRAMUSCULAR; INTRAVENOUS at 14:56

## 2020-05-06 RX ADMIN — LIDOCAINE HYDROCHLORIDE 1.5 ML: 40 SOLUTION TOPICAL at 14:09

## 2020-05-06 RX ADMIN — FENTANYL CITRATE 25 MCG: 50 INJECTION, SOLUTION INTRAMUSCULAR; INTRAVENOUS at 15:24

## 2020-05-06 RX ADMIN — FENTANYL CITRATE 25 MCG: 50 INJECTION, SOLUTION INTRAMUSCULAR; INTRAVENOUS at 15:09

## 2020-05-06 RX ADMIN — MIDAZOLAM HYDROCHLORIDE 1 MG: 1 INJECTION, SOLUTION INTRAMUSCULAR; INTRAVENOUS at 15:09

## 2020-05-06 RX ADMIN — MIDAZOLAM HYDROCHLORIDE 1 MG: 1 INJECTION, SOLUTION INTRAMUSCULAR; INTRAVENOUS at 15:21

## 2020-05-06 RX ADMIN — NICOTINE 1 PATCH: 21 PATCH, EXTENDED RELEASE TRANSDERMAL at 17:17

## 2020-05-06 RX ADMIN — BUPRENORPHINE HYDROCHLORIDE, NALOXONE HYDROCHLORIDE 1 FILM: 4; 1 FILM, SOLUBLE BUCCAL; SUBLINGUAL at 20:57

## 2020-05-06 RX ADMIN — FENTANYL CITRATE 100 MCG: 50 INJECTION, SOLUTION INTRAMUSCULAR; INTRAVENOUS at 14:56

## 2020-05-06 RX ADMIN — TOPICAL ANESTHETIC 0.5 ML: 200 SPRAY DENTAL; PERIODONTAL at 14:52

## 2020-05-06 RX ADMIN — CLONIDINE HYDROCHLORIDE 0.1 MG: 0.1 TABLET ORAL at 08:01

## 2020-05-06 RX ADMIN — SODIUM CHLORIDE: 9 INJECTION, SOLUTION INTRAVENOUS at 14:00

## 2020-05-06 RX ADMIN — LOPERAMIDE HYDROCHLORIDE 2 MG: 2 CAPSULE ORAL at 10:17

## 2020-05-06 RX ADMIN — CEFAZOLIN SODIUM 2 G: 2 INJECTION, SOLUTION INTRAVENOUS at 16:40

## 2020-05-06 RX ADMIN — CEFAZOLIN SODIUM 2 G: 2 INJECTION, SOLUTION INTRAVENOUS at 07:04

## 2020-05-06 ASSESSMENT — ACTIVITIES OF DAILY LIVING (ADL)
ADLS_ACUITY_SCORE: 10

## 2020-05-06 NOTE — PROGRESS NOTES
[facility]    Internal Medicine Hospitalist Progress Note  05/06/2020  I evaluated patient on the above date.    Joey Guerrero Jr., MD  [phone] (p)  Text Page        Assessment & Plan New actions/orders today (05/06/2020) are underlined.    Jcarlos Maravilla is a 24 year old male with a past medical history including IV heroin, who was initially hospitalization at [facility] 5/3-5/4 with febrile illness associated with IV heroin usage. However, after discharge, blood cultures became positive for MSSA and pt was subsequently directly admitted to [facility] 5/5/2020 for further workup including SKYE.    MSSA bacteremia, suspect related to IV drug use.  * Initially presented to [facility] evening 5/3/2020 following encouragement from police officers to seek evaluation following use of heroin with his friend, who overdosed. On initial evaluation there, was febrile, with signs of sepsis with elevated lactic acid, procalcitonin 1.22. Subsequently admitted and treated with broad antibiotics. COVID-19 PCR 5/4 negative. TTE 5/4 negative for vegetation. Sepsis resolved and pt was discharged 5/4 on Augmentin (for thrombophlebitis). However, on 5/5, blood cultures from 5/3 came back positive for MSSA and pt sent to [facility] ED for repeat BC's and subsequently directly admitted to [facility] 5/5.   * BC's 5/5 NGTD. Started on cefazolin on admit 5/5.   - Continue cefazolin (started 5/5).  - Order SKYE.  - ID consulted, appreciate help.  - Monitor cultures.  - SW consult - likely will need home IV antibiotics.    Superficial occlusive thrombophlebitis of right median cubital vein.  * Diagnosed by US at [facility] 5/4. Was started on Eliquis at [facility] 5/4 and discharged on Augmentin. Did not fill scripts yet after discharge.  * Thrombophlebitis improved despite not continuing Eliquis after recent discharge.  - Warm pack to affected area.  - Continue off anticoagulation.  - Monitor site of thrombophlebitis, if  worsens, consider restarting AC.  - On antibiotics as above.    IVDU - heroin.  * Patient reported using IV heroin use daily. He started using heroin 2 years ago and prefers IV injections. He admits to using around 1-2 gm daily. He had an accidental overdose 3/2020 requiring 12 mg of narcan. Patients father was a known drug user and  of an overdose. He reportedly was working on getting enrolled in [employer] in [place] but had been waiting due to lack of insurance.   * On admit, patient expressed interest in Suboxone. Seen by Psychiatry  and started on Suboxone.  - Continue Suboxone.  - PRN clonidine 0.1 mg QID for withdrawal symptoms.  - Restart PRN loperamide.  - Appreciate Psychiatry help.    Anemia, suspect chronic disease.  Pt anemic 10-11 range noted during recent hospitalization. No obvious signs of blood loss. Hgb 11.0 on admit . Iron studies  suggestive of anemia of chornic disease, peripheral smear pending.  Recent Labs   Lab 20  1954 20  1043 20  0551 20  0139   HGB 11.0* 10.7* 10.3* 11.6*   - Monitor CBC.  - Consider prbc transfusion if hgb </= 7.0 or if significant bleeding with hemodynamic instability or if symptomatic.    Nicotine dependence  Patient reported smoking 1.5 PPD. Started on nicotine patch.  - Continue nicotine 21 mg patch.      Diet: NPO per Anesthesia Guidelines for Procedure/Surgery Except for: Meds    Prophylaxis: PCD's, ambulation.   Cook Catheter: not present  Code Status: Full Code      Disposition Plan   Expected discharge: 2 - 3 days, recommended to prior living arrangement pending above.  Entered: Joey Guerrero MD 2020, 7:24 AM     Communication.  - I updated pt's grandmother .      Interval History   Feels unwell due to withdrawal symptoms.  Noted loose stools.  Right antecubital region swelling continues to improve.    -Data reviewed today: I reviewed all new labs and imaging over the last 24 hours. I  personally reviewed no images or EKG's today.    Physical Exam   Heart Rate: 70, Blood pressure 129/76, pulse 101, temperature 97.8  F (36.6  C), temperature source Oral, resp. rate 16, weight 96.4 kg (212 lb 9.6 oz), SpO2 98 %.  Vitals:    05/06/20 0500   Weight: 96.4 kg (212 lb 9.6 oz)     Vital Signs with Ranges  Temp:  [97.2  F (36.2  C)-98.6  F (37  C)] 97.8  F (36.6  C)  Pulse:  [101] 101  Heart Rate:  [70-86] 70  Resp:  [14-16] 16  BP: (111-136)/(70-89) 129/76  SpO2:  [98 %-100 %] 98 %  Patient Vitals for the past 24 hrs:   BP Temp Temp src Pulse Heart Rate Resp SpO2 Weight   05/06/20 0500 -- -- -- -- -- -- -- 96.4 kg (212 lb 9.6 oz)   05/06/20 0333 129/76 97.8  F (36.6  C) Oral -- 70 16 98 % --   05/05/20 2334 111/70 98.1  F (36.7  C) Oral -- 73 16 99 % --   05/05/20 2006 121/80 98.6  F (37  C) Oral -- 83 16 99 % --   05/05/20 1515 132/89 98.1  F (36.7  C) Oral 101 -- 16 100 % --     I/O's Last 24 hours  No intake/output data recorded.    Constitutional: Awake, alert, pleasant.  Respiratory: Diminished in bases. No crackles or wheezes.  Cardiovascular: RRR, no m/r/g.  GI: Soft, nt, nd, +BS.  Skin/Integumen: R AC region with some firmness, bruising noted.  Other:        Data   Recent Labs   Lab 05/06/20  0629 05/05/20 1954 05/05/20  1043 05/04/20  0551   WBC  --  4.2 4.6 4.4   HGB  --  11.0* 10.7* 10.3*   MCV  --  55* 57* 57*   PLT  --  227 210 189  190     --  137 138   POTASSIUM 3.6  --  3.7 3.9   CHLORIDE 112*  --  108 109   CO2 23  --  24 23   BUN 4*  --  3* 9   CR 0.68  --  0.64* 0.99  0.97   ANIONGAP 7  --  5 6   MAGALI 9.1  --  8.5 7.5*   GLC 95  --  95 86   ALBUMIN 3.3*  --   --   --    PROTTOTAL 7.5  --   --   --    BILITOTAL 0.4  --   --   --    ALKPHOS 66  --   --   --    ALT 22  --   --   --    AST 13  --   --   --      Recent Labs   Lab Test 05/06/20  0629 05/05/20  1043 05/04/20  0551 05/04/20  0139   GLC 95 95 86 88         No results found for this or any previous visit (from the past 24  hour(s)).    Medications   All medications were reviewed.      ceFAZolin  2 g Intravenous Q8H     nicotine  1 patch Transdermal Daily     nicotine   Transdermal Q8H     sodium chloride (PF)  3 mL Intracatheter Q8H

## 2020-05-06 NOTE — CONSULTS
5/6/20 CD consult acknowledged. Per chart review, pt states he has had a Rule 25 in April of 2020 and plans to admit to [facility] residential program. Social work can verify completion of Rule 25 assessment and CD treatment services are in place by calling [facility] Rule 25 at [phone] and [facility] Rule 25 at [phone] to verify Rule 25 assessment/funding and referral to [facility]. Social work can also call [facility] to verify this at [phone] or e-mail [email or web address]. Will close consult at this time since pt is reporting he has a valid Rule 25 and CD treatment services are in place. If social work cannot verify pt has a valid Rule 25 assessment completed then CD consult can be reordered at that time.     Lakesha Grace, [facility], LADC

## 2020-05-06 NOTE — PRE-PROCEDURE
GENERAL PRE-PROCEDURE:   Procedure:  Chago  Date/Time:  5/6/2020 2:52 PM    Written consent obtained?: Yes    Risks and benefits: Risks, benefits and alternatives were discussed    Consent given by:  Patient  Patient states understanding of procedure being performed: Yes    Patient's understanding of procedure matches consent: Yes    Procedure consent matches procedure scheduled: Yes    Expected level of sedation:  Moderate  Appropriately NPO:  Yes  ASA Class:  Class 2- mild systemic disease, no acute problems, no functional limitations  Mallampati  :  Grade 2- soft palate, base of uvula, tonsillar pillars, and portion of posterior pharyngeal wall visible  Lungs:  Lungs clear with good breath sounds bilaterally  Heart:  Normal heart sounds and rate  History & Physical reviewed:  History and physical reviewed and no updates needed  Statement of review:  I have reviewed the lab findings, diagnostic data, medications, and the plan for sedation

## 2020-05-06 NOTE — PROGRESS NOTES
SW:  D: SW consult acknowledged but due to patient being off unit and unavailable to meet it will be completed at a later time.     Call placed to [facility] to find out if Rule 25 had been done through this [facility]. SW spoke to  who reported that patient had been at [facility] and had left. While he was there, the Rule 25 had been done but at this time, funding is unknown as they need to know where patient was staying after leaving [facility]. She also noted that he has no insurance coverage and asked that he complete the [employer] application. Patient currently off unit so SW will follow up with these questions.     I: Will continue to follow     DELANO Mckeon    [facility]

## 2020-05-06 NOTE — CONSULTS
[facility]    Infectious Disease Consultation     Date of Admission:  5/5/2020  Date of Consult (When I saw the patient): 05/06/20    Assessment & Plan   Jcarlos Maravilla is a 24 year old male who was admitted on 5/5/2020.     Impression:  1. 24 y.o male with IV heroin use.   2. Admitted with right antecubital superficial thrombophlebitis.   3. Bacteremic with MSSA. So faro nly 1/4 cultures.   4. On ancef.   5. TTE negative.     Recommendations:   1. Continue Ancef   2. Get daily blood cultures till clears   3. Avoid any long term IVlines for now.   4.will follow up on the SKYE pending today      Bekah Ackerman MD    Reason for Consult   Reason for consult: I was asked to evaluate this patient for MSSA bacteremia.    Primary Care Physician   Physician No Ref-Primary    Chief Complaint   Thrombophlebitis     History is obtained from the patient and medical records    History of Present Illness   Jcarlos Maravilla is a 24 year old male who does  IV heroin, who was initially hospitalization at [facility] 5/3-5/4 with febrile illness associated with IV heroin usage. However, after discharge, blood cultures became positive for MSSA and pt was subsequently directly admitted to [facility] 5/5/2020 for further workup including SKYE.    Past Medical History   I have reviewed this patient's medical history and updated it with pertinent information if needed.   No past medical history on file.    Past Surgical History   I have reviewed this patient's surgical history and updated it with pertinent information if needed.  No past surgical history on file.    Prior to Admission Medications   Prior to Admission Medications   Prescriptions Last Dose Informant Patient Reported? Taking?   amoxicillin-clavulanate (AUGMENTIN) 875-125 MG tablet NEW  No No   Sig: Take 1 tablet by mouth every 12 hours for 7 days   apixaban ANTICOAGULANT (ELIQUIS STARTER PACK) 5 MG tablet NEW  No No   Sig: Take 2 tablets (10 mg) by mouth 2 times  daily for 7 days, THEN 1 tablet (5 mg) 2 times daily for 23 days.   ibuprofen (ADVIL/MOTRIN) 600 MG tablet PRN  Yes No   Sig: Take 600 mg by mouth every 6 hours as needed for moderate pain   loperamide (IMODIUM) 2 MG capsule PRN  No No   Sig: Take 1 capsule (2 mg) by mouth 4 times daily as needed for diarrhea      Facility-Administered Medications: None     Allergies   Allergies   Allergen Reactions     No Known Drug Allergies        Immunization History   Immunization History   Administered Date(s) Administered     DTAP (<7y) 1997, 01/23/1998, 06/09/1998, 01/25/2001     HEPA 09/30/2014     HepB 1995, 1997, 06/09/1998     Hib (PRP-T) 1997, 01/23/1998, 06/09/1998, 09/23/1998     MMR 01/25/2001, 10/31/2014     Meningococcal (Menactra ) 09/30/2014     OPV, trivalent, live 1997, 01/23/1998, 09/23/1998     TDAP Vaccine (Boostrix) 09/30/2014     Varicella 09/23/1998, 09/30/2014       Social History   I have reviewed this patient's social history and updated it with pertinent information if needed. Jcarlos Maravilla  reports that he has been smoking. He has been smoking about 1.50 packs per day. He has never used smokeless tobacco. He reports current alcohol use. He reports current drug use. Frequency: 7.00 times per week. Drugs: Marijuana and Opiates.    Family History   I have reviewed this patient's family history and updated it with pertinent information if needed.   Family History   Problem Relation Age of Onset     Cerebrovascular Disease Mother      Diabetes Maternal Grandfather      Asthma Maternal Grandfather        Review of Systems   The 10 point Review of Systems is negative other than noted in the HPI or here.     Physical Exam   Temp: 98  F (36.7  C) Temp src: Oral BP: 114/77 Pulse: 101 Heart Rate: 67 Resp: 16 SpO2: 98 % O2 Device: None (Room air)    Vital Signs with Ranges  Temp:  [97.8  F (36.6  C)-98.6  F (37  C)] 98  F (36.7  C)  Pulse:  [101] 101  Heart Rate:  [67-83]  67  Resp:  [16] 16  BP: (111-132)/(70-89) 114/77  SpO2:  [98 %-100 %] 98 %  212 lbs 9.6 oz  Body mass index is 28.83 kg/m .    GENERAL APPEARANCE:  alert and no distress  EYES: Eyes grossly normal to inspection, PERRL and conjunctivae and sclerae normal  HENT: ear canals and TM's normal and nose and mouth without ulcers or lesions  NECK: no adenopathy, no asymmetry, masses, or scars and thyroid normal to palpation  RESP: lungs clear to auscultation - no rales, rhonchi or wheezes  CV: regular rates and rhythm, normal S1 S2, no S3 or S4 and no murmur, click or rub  LYMPHATICS: normal ant/post cervical and supraclavicular nodes  ABDOMEN: soft, nontender, without hepatosplenomegaly or masses and bowel sounds normal  MS: extremities normal- no gross deformities noted  SKIN: no suspicious lesions or rashes      Data   Lab Results   Component Value Date    WBC 4.2 05/06/2020    HGB 11.4 (L) 05/06/2020    HCT 36.3 (L) 05/06/2020     05/06/2020     05/06/2020    POTASSIUM 3.6 05/06/2020    CHLORIDE 112 (H) 05/06/2020    CO2 23 05/06/2020    BUN 4 (L) 05/06/2020    CR 0.68 05/06/2020    GLC 95 05/06/2020    AST 13 05/06/2020    ALT 22 05/06/2020    ALKPHOS 66 05/06/2020    BILITOTAL 0.4 05/06/2020     Recent Labs   Lab 05/05/20  1106 05/05/20  1043 05/04/20  0138 05/04/20  0046   CULT No growth after 17 hours No growth after 17 hours No growth after 2 days Cultured on the 1st day of incubation:  Staphylococcus aureus  *  Critical Value/Significant Value, preliminary result only, called to and read back by  Dr Fink, On call hospitalist, 5/5/20 @ 2976 TF    (Note)  POSITIVE for STAPHYLOCOCCUS AUREUS and NEGATIVE for the mecA gene  (not MRSA) by [employer] multiplex nucleic acid test. The mecA gene was  not detected. Final identification and antimicrobial susceptibility  testing will be verified by standard methods.    Specimen tested with Verigene multiplex, gram-positive blood culture  nucleic acid test for the  following targets: Staph aureus, Staph  epidermidis, Staph lugdunensis, other Staph species, Enterococcus  faecalis, Enterococcus faecium, Streptococcus species, S. agalactiae,  S. anginosus grp., S. pneumoniae, S. pyogenes, Listeria sp., mecA  (methicillin resistance) and Rico/B (vancomycin resistance).    Critical Value/Significant Value called to and read back by DR YURI MIMS ON CALL [facility] 0603 05.05.20 CF       Recent Labs   Lab Test 05/05/20  1106 05/05/20  1043 05/04/20  0138 05/04/20  0046   CULT No growth after 17 hours No growth after 17 hours No growth after 2 days Cultured on the 1st day of incubation:  Staphylococcus aureus  *  Critical Value/Significant Value, preliminary result only, called to and read back by  Dr Mims, On call [facility], 5/5/20 @ 0239 TF    (Note)  POSITIVE for STAPHYLOCOCCUS AUREUS and NEGATIVE for the mecA gene  (not MRSA) by [employer] multiplex nucleic acid test. The mecA gene was  not detected. Final identification and antimicrobial susceptibility  testing will be verified by standard methods.    Specimen tested with Verigene multiplex, gram-positive blood culture  nucleic acid test for the following targets: Staph aureus, Staph  epidermidis, Staph lugdunensis, other Staph species, Enterococcus  faecalis, Enterococcus faecium, Streptococcus species, S. agalactiae,  S. anginosus grp., S. pneumoniae, S. pyogenes, Listeria sp., mecA  (methicillin resistance) and Rico/B (vancomycin resistance).    Critical Value/Significant Value called to and read back by DR YURI MIMS ON CALL [facility] 0603 05.05.20 CF         Amount of time performed on this consult: 45 minutes. This includes face to face assessment and care coordination with the primary team.

## 2020-05-06 NOTE — CONSULTS
Consult Date:  05/06/2020      REASON FOR CONSULTATION:  Possible endocarditis.      HISTORY OF PRESENT ILLNESS:  The patient is a 24-year-old gentleman without prior cardiac history, who was admitted to the hospital with Staph bacteremia.  He initially presented to [facility] on 05/03/2020 following encroachment from  to seek evaluation following use of heroin with his friend, who overdosed.  Initially, he was febrile when he was brought to the hospital with signs of sepsis.  He was subsequently admitted to the hospital and initiated on broad-spectrum antibiotics.  COVID PCR was negative.  He had a transthoracic echocardiogram which showed no evidence of valvular vegetations.  LV function was preserved.  His symptoms resolved, and he was ultimately discharged 2 days ago on Augmentin.      However, yesterday, his blood cultures came back positive for MSSA.  The patient was sent to [facility] ED for repeat blood cultures and was subsequently transferred here for further evaluation.      He is currently hemodynamically stable.      PAST MEDICAL HISTORY:  None.      SOCIAL HISTORY:  He smokes approximately 1-1/2 packs per day.  He also uses marijuana and heroin.      FAMILY HISTORY:  Negative for cardiovascular disease.      ALLERGIES:  NO KNOWN DRUG ALLERGIES.      REVIEW OF SYSTEMS:  Comprehensive review of systems was performed and essentially negative, except as mentioned in the HPI.      CURRENT MEDICATIONS:  Ancef 2 grams every 8 hours, nicotine patch.      PHYSICAL EXAMINATION:   VITAL SIGNS:  Blood pressure is 114/77, pulse is 67.   GENERAL:  He is resting, appears to be in no acute distress.   NECK:  Jugular venous pressure is normal.  Carotid upstroke is brisk.   LUNGS:  Clear to auscultation bilaterally.   HEART:  Regular rhythm. No murmurs appreciated.   ABDOMEN:  Soft, nontender.   EXTREMITIES:  Warm.  No edema, cyanosis or clubbing.   SKIN:  No rashes noted with the exception  of right AC erythema.   VESSELS:  2+ radial, 2+ femoral.   NEUROLOGIC:  No focal deficits.      IMPRESSION AND PLAN:   1.  MSSA (methicillin-susceptible Staphylococcus aureus) bacteremia.   2.  Superficial thrombophlebitis of the right median cubital vein.   3.  Nicotine dependence.   4.  Heroin use.      I agree with transesophageal echocardiogram to rule out endocarditis.  Further cardiac evaluation after the transesophageal echocardiogram, if necessary.      I appreciate the opportunity to be part of this patient's care.         ELOY BOYKIN MD             D: 2020   T: 2020   MT: MARGIE      Name:     RUDY VIVAS   MRN:      [id number]        Account:       [id number]   :      1995           Consult Date:  2020      Document: [id number]

## 2020-05-06 NOTE — PROGRESS NOTES
Care Suites Procedure Nursing Note    Patient Information  Name: Jcarlos Maravilla  Age: 24 year old    Procedure  Procedure: SKYE  1335- Patient arrived to Special Echo room for SKYE, alert and oriented, procedure explained, all questions answered. Pt denies difficulty swallowing, no sleep apnea. No dentures or loose teeth. NPO since before midnight. All questions & concerns addressed. MD arrived reviewed procedure with pt, obtained consent. Sedation Assessment completed at this time. Time out was then done.    SKYE: Pt tolerated well with stable vital signs throughout. See SKYE Flowsheet. Total sedation given - 10 mg Versed & 200 mcg Fentanyl. Dr Antonio spoke with pt post procedure.     1610- Pt transferred to [facility] per cart. Resp even & unlabored upon transfer. Pt  A/O. Pt to be clear liquid diet to start.    Procedure start time: 1402  Procedure complete time: 1610  Concerns/abnormal assessment: No  If abnormal assessment, provider notified: N/A  Plan/Other: return to inpatient room    Jennifer Razo RN

## 2020-05-06 NOTE — PLAN OF CARE
A&OX4.  Up independently.  Patient took a shower today.  Denies pain.  Started Suboxone today.  C/o anxiety, prn clonodine given X1.  Stated he had 5 episodes of diarrhea overnight, prn imodium given X1.  Patient was NPO all day and went to SKYE procedure around 2:30pm today and is currently still there.

## 2020-05-06 NOTE — PLAN OF CARE
A&O. VSS on RA. Denies pain.Up indep. On NPO possibly SKYE today. IV SL.On tele. Will continue to monitor.

## 2020-05-06 NOTE — CONSULTS
"Consult Date:  05/06/2020      PSYCHIATRY CONSULTATION      REQUESTING PHYSICIAN:  TOYA Bell      REASON FOR CONSULTATION:  IV heroin use and possible endocarditis, assist with withdrawal management and possible Suboxone use.      IDENTIFYING DATA:  Jcarlos is a pleasant 24-year-old  male who was brought into the hospital from [place] and then sent to us from [facility].  He had been using IV heroin with a friend.  His friend apparently overdosed, and when the police got there, they saw the patient walking down the street.  He had a fever when he was brought in, and he has cultured positive for Staph aureus, so they are ruling him out for endocarditis.      CHIEF COMPLAINT:  \"I was supposed to go to [facility].  I had a Rule 25 in April.\"      HISTORY OF PRESENT ILLNESS:  Jcarlos is a 24-year-old  male who has a history of IV heroin use over the last 2 years, but has never been in treatment.  He is COVID negative.  He has been staying with his grandmother, but struggling with his IV drug use.  He has been injecting and developed a fever.  It looks like he has a thrombophlebitis and has positive blood cultures, so they are ruling him out for endocarditis and giving him antibiotics.  He is currently in mild opiate withdrawal.  He has never taken Suboxone legally, but says he used a friend's at one point and thought it was helpful.  He also abuses marijuana.  Interestingly, he has never had any formal treatment.  He was arrested in April for receiving stolen property and has a pending court case.  Currently, he does not have any funding.  He says he had a Rule 25 through [facility], but he lives in [place], and there was some discussion about getting him enrolled in [facility].  He does not endorse any specific symptoms of depression, anxiety, hypomania, silverio, psychosis, panic disorder, obsessive-compulsive disorder, eating disorder history or trauma " "history.  He has had some difficulty sleeping because of withdrawal.  This morning, he looks comfortable and he is help-seeking.      PAST PSYCHIATRIC HISTORY:  Unremarkable.      PAST CHEMICAL DEPENDENCY HISTORY:  Notable for IV heroin use with no previous treatment.  He also abuses marijuana.  His drug screen showed opiates and amphetamines on admission.  He denies amphetamine use is a regular concern.      PAST MEDICAL HISTORY:  Notable for what appears to be thrombophlebitis and possible sepsis secondary to IV drug use.      PRIOR TO ADMISSION MEDICATIONS:  None.      FAMILY HISTORY:  Negative for mental illness, chemical dependency or suicide.      SOCIAL HISTORY:  The patient is single, lives with his grandmother, has a couple of siblings.  He grew up in [place] and has a high school degree, but he is currently unemployed and has legal issues relating to \"receiving stolen property.\"  His court issues have not yet been resolved.      REVIEW OF SYSTEMS:  A 10-point review of systems is positive for fever, consistent with his infection on constitutional exam, otherwise negative.      MOST RECENT VITAL SIGNS:  Blood pressure 114/77, temperature 98, pulse 101, respiratory rate 16, oxygen saturation 98%.      MENTAL STATUS EXAMINATION:  Appearance:  The patient is a pleasant gentleman lying in bed.  He is alert, oriented and cooperative.  Speech is of normal rate, flow and tone.  Use of language appropriate.  Motor exam:  Calm.  Coordination, station and gait within normal limits.  Muscle strength and tone adequate.  Affect is pleasant.  Mood \"okay.\"  Thought process logical, coherent and goal directed.  No loosening of associations, no flight of ideas.  No formal thought disorder on exam.  Thought content negative for hallucinations, delusions, paranoia, suicidal or homicidal ideation.  Insight and judgment poor with respect to chemical use.  Cognitive exam:  The patient is alert and oriented x 3.  " Concentration intact.  Recent and remote memory intact.  General fund of knowledge average.      IMPRESSION:  The patient is a 24-year-old  male coming in with an opiate use disorder and suspected sepsis picture.  He is a candidate for Suboxone, so initiating 4-1 one film b.i.d. is reasonable since he has a couple of days out from using.  He does not appear to need chemical dependency treatment, so we need to check on Rule 25 status and funding.  Ideally, he should go directly to treatment, given his high relapse risk.  Suboxone will need to be managed by a provider in the community at some point, so looking for options in [facility] is also advisable.      DIAGNOSES:   1.  Opiate use disorder, severe.   2.  Sepsis, possible endocarditis from IV drug use.   3.  Rule out cannabis use disorder.   4.  Rule out amphetamine use disorder.      PLAN:   1.  Initiate Suboxone 4-1 one film b.i.d.   2.  No opiates should be prescribed, no controlled substances.   3.  I will ask  to check on his Rule 25 status and funding for treatment.  I ordered a chemical dependency assessment to assist with this process as it would be ideal to track him directly into treatment, given his very high risk situation.   4.  Medical management as you are.         JEREMIAS CEJA MD             D: 2020   T: 2020   MT: MARGIE      Name:     RUDY VIVAS   MRN:      [id number]        Account:       [id number]   :      1995           Consult Date:  2020      Document: [id number]       cc: MALINA KEENAN NP

## 2020-05-07 LAB
ANION GAP SERPL CALCULATED.3IONS-SCNC: 5 MMOL/L (ref 3–14)
BACTERIA SPEC CULT: ABNORMAL
BASOPHILS # BLD AUTO: 0 10E9/L (ref 0–0.2)
BASOPHILS NFR BLD AUTO: 0.6 %
BUN SERPL-MCNC: 6 MG/DL (ref 7–30)
CALCIUM SERPL-MCNC: 8.8 MG/DL (ref 8.5–10.1)
CHLORIDE SERPL-SCNC: 110 MMOL/L (ref 94–109)
CO2 SERPL-SCNC: 25 MMOL/L (ref 20–32)
COPATH REPORT: NORMAL
CREAT SERPL-MCNC: 0.64 MG/DL (ref 0.66–1.25)
DIFFERENTIAL METHOD BLD: ABNORMAL
EOSINOPHIL # BLD AUTO: 0.5 10E9/L (ref 0–0.7)
EOSINOPHIL NFR BLD AUTO: 8.4 %
ERYTHROCYTE [DISTWIDTH] IN BLOOD BY AUTOMATED COUNT: 18.3 % (ref 10–15)
GFR SERPL CREATININE-BSD FRML MDRD: >90 ML/MIN/{1.73_M2}
GLUCOSE SERPL-MCNC: 84 MG/DL (ref 70–99)
HCT VFR BLD AUTO: 33.8 % (ref 40–53)
HGB BLD-MCNC: 11 G/DL (ref 13.3–17.7)
IMM GRANULOCYTES # BLD: 0 10E9/L (ref 0–0.4)
IMM GRANULOCYTES NFR BLD: 0 %
LYMPHOCYTES # BLD AUTO: 2.8 10E9/L (ref 0.8–5.3)
LYMPHOCYTES NFR BLD AUTO: 52.1 %
MCH RBC QN AUTO: 17.1 PG (ref 26.5–33)
MCHC RBC AUTO-ENTMCNC: 32.5 G/DL (ref 31.5–36.5)
MCV RBC AUTO: 53 FL (ref 78–100)
MONOCYTES # BLD AUTO: 0.6 10E9/L (ref 0–1.3)
MONOCYTES NFR BLD AUTO: 11.4 %
NEUTROPHILS # BLD AUTO: 1.5 10E9/L (ref 1.6–8.3)
NEUTROPHILS NFR BLD AUTO: 27.5 %
PLATELET # BLD AUTO: 248 10E9/L (ref 150–450)
POTASSIUM SERPL-SCNC: 3.8 MMOL/L (ref 3.4–5.3)
RBC # BLD AUTO: 6.42 10E12/L (ref 4.4–5.9)
SODIUM SERPL-SCNC: 140 MMOL/L (ref 133–144)
SPECIMEN SOURCE: ABNORMAL
WBC # BLD AUTO: 5.5 10E9/L (ref 4–11)

## 2020-05-07 PROCEDURE — 85025 COMPLETE CBC W/AUTO DIFF WBC: CPT | Performed by: INTERNAL MEDICINE

## 2020-05-07 PROCEDURE — 99232 SBSQ HOSP IP/OBS MODERATE 35: CPT | Performed by: INTERNAL MEDICINE

## 2020-05-07 PROCEDURE — 80048 BASIC METABOLIC PNL TOTAL CA: CPT | Performed by: INTERNAL MEDICINE

## 2020-05-07 PROCEDURE — 25000128 H RX IP 250 OP 636: Performed by: NURSE PRACTITIONER

## 2020-05-07 PROCEDURE — 25000132 ZZH RX MED GY IP 250 OP 250 PS 637: Performed by: PSYCHIATRY & NEUROLOGY

## 2020-05-07 PROCEDURE — 36415 COLL VENOUS BLD VENIPUNCTURE: CPT | Performed by: INTERNAL MEDICINE

## 2020-05-07 PROCEDURE — 12000000 ZZH R&B MED SURG/OB

## 2020-05-07 PROCEDURE — 25000132 ZZH RX MED GY IP 250 OP 250 PS 637: Performed by: INTERNAL MEDICINE

## 2020-05-07 PROCEDURE — 25000132 ZZH RX MED GY IP 250 OP 250 PS 637: Performed by: NURSE PRACTITIONER

## 2020-05-07 RX ADMIN — BUPRENORPHINE HYDROCHLORIDE, NALOXONE HYDROCHLORIDE 1 FILM: 4; 1 FILM, SOLUBLE BUCCAL; SUBLINGUAL at 20:21

## 2020-05-07 RX ADMIN — CEFAZOLIN SODIUM 2 G: 2 INJECTION, SOLUTION INTRAVENOUS at 23:24

## 2020-05-07 RX ADMIN — NICOTINE 1 PATCH: 21 PATCH, EXTENDED RELEASE TRANSDERMAL at 09:13

## 2020-05-07 RX ADMIN — CEFAZOLIN SODIUM 2 G: 2 INJECTION, SOLUTION INTRAVENOUS at 15:34

## 2020-05-07 RX ADMIN — Medication 1 MG: at 23:25

## 2020-05-07 RX ADMIN — BUPRENORPHINE HYDROCHLORIDE, NALOXONE HYDROCHLORIDE 1 FILM: 4; 1 FILM, SOLUBLE BUCCAL; SUBLINGUAL at 09:12

## 2020-05-07 RX ADMIN — CEFAZOLIN SODIUM 2 G: 2 INJECTION, SOLUTION INTRAVENOUS at 07:02

## 2020-05-07 ASSESSMENT — ACTIVITIES OF DAILY LIVING (ADL)
ADLS_ACUITY_SCORE: 10

## 2020-05-07 NOTE — PROGRESS NOTES
SW:  D: SAIRA received application by fax from [facility] at [facility] requesting that patient sign it. SW brought it to patient and explained that this will help get funding for treatment. Also noted that if he has any questions filling it out to let SW know.     I: Will continue to follow    DELANO Mckeon    [facility]

## 2020-05-07 NOTE — PLAN OF CARE
VSS on RA, tele: NSR. denies chest pain or SOB. Nicotine patch on left shoulder. Tolerating oral intake. Will continue to monitor.

## 2020-05-07 NOTE — CONSULTS
SW:    Care Transition Initial Assessment - SW     Met with: Patient    Active Problems:    Bacteremia       DATA  Lives With: grandparent(s)   Living Arrangements: house  Quality of Family Relationships: appears to be supportive as he is living with his grandma.  Description of Support System: Supportive  Who is your support system?: Grandparent(s)  Support Assessment: Adequate social supports.   Identified issues/concerns regarding health management:    Quality of Family Relationships: involved        ASSESSMENT  Cognitive Status:  awake, alert and oriented  Concerns to be addressed: discharge planning to chemical dependency in patient treatment .    Per Social work consult for chemical dependency planning. Patient was admitted on 5/5/20 for bacterial endocarditis with tentative discharge TBD. Reviewed chart and spoke to patient. At this time, patient reports that he is living with his grandma in [place]. He reports that he had a Rule 25 done around 4/1/20 at [facility] in [place] and reports that his  there is [place]. Patient signed Release of Information for both [facility] as well as for [facility] [employer]. He reported that after the Rule 25 was done and he left [facility] he went back to his grandma's home in [place] prior to coming to the hospital. SANJEEV's have been faxed to [facility] and [facility] to request copy of assessment.      PLAN  Financial costs for the patient includes nothing at this time .  Patient given options and choices for discharge yes and he intends to discharge if able to treament .  Patient/family is agreeable to the plan?  Yes:   Transportation/person available to transport on day of discharge  is TBD and have they been notified/set up TBD  Patient Goals and Preferences: in patient treatment .  Patient anticipates discharging to:  In patient treatement .    Will  continue to follow.    DELANO Mckeon    [facility]

## 2020-05-07 NOTE — PLAN OF CARE
A/Ox4. Up independently.  Voiding adequately. VSS on RA- Tele NSR. CMS intact. Denies pain. Krzysztof patch to L deltoid. Progressing per POC. Will cont to monitor.

## 2020-05-07 NOTE — PROGRESS NOTES
[facility]    Internal Medicine Hospitalist Progress Note  05/07/2020  I evaluated patient on the above date.    Joey Guerrero Jr., MD  [phone] (p)  Text Page        Assessment & Plan New actions/orders today (05/07/2020) are underlined.    Jcarlos Maravilla is a 24 year old male with a past medical history including IV heroin, who was initially hospitalization at [facility] 5/3-5/4 with febrile illness associated with IV heroin usage. However, after discharge, blood cultures became positive for MSSA and pt was subsequently directly admitted to Replaced by [facility] 5/5/2020 for further workup including SKYE.    MSSA bacteremia, suspect related to IV drug use.  * Initially presented to [facility] evening 5/3/2020 following encouragement from police officers to seek evaluation following use of heroin with his friend, who overdosed. On initial evaluation there, was febrile, with signs of sepsis with elevated lactic acid, procalcitonin 1.22. Subsequently admitted and treated with broad antibiotics. COVID-19 PCR 5/4 negative. TTE 5/4 negative for vegetation. Sepsis resolved and pt was discharged 5/4 on Augmentin (for thrombophlebitis). However, on 5/5, blood cultures from 5/3 came back positive for MSSA and pt sent to [facility] ED for repeat BC's and subsequently directly admitted to [facility]placed by [facility] 5/5.   * BC's 5/5 NGTD. Started on cefazolin on admit 5/5. SKYE 5/6 negative for vegetations. ID consulted. BC's 5/6 NGTD.  - Continue cefazolin (started 5/5) - likely will need 4 weeks IV antibiotics.  - Monitor cultures - needs cultures negative for 3 days before PICC/midline placement (possibly on 5/8).  - Appreciate ID help, I d/w Dr. Ackerman 5/6.    Superficial occlusive thrombophlebitis of right median cubital vein.  * Diagnosed by US at [facility] 5/4. Was started on Eliquis at [facility] 5/4 and discharged on Augmentin. Did not fill scripts yet after discharge.  * Thrombophlebitis improved despite not continuing Eliquis  after recent discharge.  - Continue off anticoagulation.  - Monitor site of thrombophlebitis, if worsens, consider restarting AC.  - On antibiotics as above.    IVDU - heroin.  * Patient reported using IV heroin use daily. He started using heroin 2 years ago and prefers IV injections. He admits to using around 1-2 gm daily. He had an accidental overdose 3/2020 requiring 12 mg of narcan. Patients father was a known drug user and  of an overdose. He reportedly was working on getting enrolled in [email or web address] in [place] but had been waiting due to lack of insurance.   * On admit, patient expressed interest in Suboxone. Seen by Psychiatry consulted  and started on Suboxone.  - Continue Suboxone.  - Continue PRN clonidine 0.1 mg QID for withdrawal symptoms.  - Continue PRN loperamide.  - Appreciate Psychiatry help.    Anemia, suspect chronic disease.  Pt anemic 10-11 range noted during recent hospitalization. No obvious signs of blood loss. Hgb 11.0 on admit . 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  Recent Labs   Lab 20  0610 20  0629 20  1954 20  1043 20  0551 20  0139   HGB 11.0* 11.4* 11.0* 10.7* 10.3* 11.6*   - Monitor CBC.  - Consider prbc transfusion if hgb </= 7.0 or if significant bleeding with hemodynamic instability or if symptomatic.    Nicotine dependence  Patient reported smoking 1.5 PPD. Started on nicotine patch.  - Continue nicotine 21 mg patch.      Diet: Regular Diet Adult    Prophylaxis: PCD's, ambulation.   Cook Catheter: not present  Code Status: Full Code      Disposition Plan   Expected discharge: 1-2d, recommended to home with grandma, but home IV abx may not be feasible given IVDU history/risks; may need TCU; pending above.   Entered: Joey Guerrero MD 2020, 11:01 AM     Communication.  - I updated pt's grandmother .      Interval History   Doing OK.  Right antecubital region  swelling feels better.    -Data reviewed today: I reviewed all new labs and imaging over the last 24 hours. I personally reviewed no images or EKG's today.    Physical Exam   Heart Rate: 78, Blood pressure 112/55, pulse 56, temperature 97.8  F (36.6  C), temperature source Oral, resp. rate 16, weight 96.3 kg (212 lb 3.2 oz), SpO2 97 %.  Vitals:    05/06/20 0500 05/07/20 0630   Weight: 96.4 kg (212 lb 9.6 oz) 96.3 kg (212 lb 3.2 oz)     Vital Signs with Ranges  Temp:  [97.3  F (36.3  C)-97.8  F (36.6  C)] 97.8  F (36.6  C)  Pulse:  [] 56  Heart Rate:  [78-91] 78  Resp:  [16] 16  BP: (100-152)/(55-97) 112/55  SpO2:  [97 %-100 %] 97 %  Patient Vitals for the past 24 hrs:   BP Temp Temp src Pulse Heart Rate Resp SpO2 Weight   05/07/20 0957 -- -- -- -- -- 16 -- --   05/07/20 0810 112/55 97.8  F (36.6  C) Oral 56 -- 16 97 % --   05/07/20 0630 -- -- -- -- -- -- -- 96.3 kg (212 lb 3.2 oz)   05/07/20 0500 110/63 97.6  F (36.4  C) Oral 63 -- 16 98 % --   05/06/20 2339 109/57 97.8  F (36.6  C) Oral -- -- 16 98 % --   05/06/20 1904 105/55 97.4  F (36.3  C) Oral 78 78 16 98 % --   05/06/20 1621 108/69 97.8  F (36.6  C) Oral 81 83 16 97 % --   05/06/20 1610 112/72 -- -- 58 -- -- 97 % --   05/06/20 1605 112/69 -- -- 57 -- -- 98 % --   05/06/20 1600 107/70 -- -- 55 -- -- 99 % --   05/06/20 1555 116/69 -- -- 58 -- -- 99 % --   05/06/20 1551 114/72 -- -- 62 -- -- 99 % --   05/06/20 1548 114/80 -- -- 69 -- 16 98 % --   05/06/20 1545 117/80 -- -- 77 -- -- 98 % --   05/06/20 1542 119/72 -- -- 82 -- -- 98 % --   05/06/20 1539 119/80 -- -- 93 -- -- 98 % --   05/06/20 1536 (!) 151/95 -- -- 111 -- -- 98 % --   05/06/20 1533 (!) 152/93 -- -- 106 -- -- 98 % --   05/06/20 1530 100/55 -- -- 115 -- -- 98 % --   05/06/20 1527 111/69 -- -- 63 -- -- 98 % --   05/06/20 1524 114/72 -- -- 67 -- -- 98 % --   05/06/20 1521 111/65 -- -- 67 -- -- 98 % --   05/06/20 1518 113/71 -- -- 67 -- -- 98 % --   05/06/20 1515 110/68 -- -- 76 -- -- 98 % --    20 1512 118/76 -- -- 74 -- -- 98 % --   20 1509 121/79 -- -- 82 -- -- 98 % --   20 1506 122/81 -- -- 81 -- -- 97 % --   20 1502 120/81 -- -- 80 -- -- 98 % --   20 1459 (!) 132/93 -- -- 76 -- -- 99 % --   20 1456 (!) 136/97 -- -- 80 -- 16 100 % --   20 1354 (!) 125/90 -- -- 84 -- 16 98 % --   20 1246 113/66 97.3  F (36.3  C) Oral -- 91 16 97 % --     I/O's Last 24 hours  No intake/output data recorded.    Constitutional: Awake, alert, pleasant, conversant.  Respiratory: Diminished in bases. No crackles or wheezes.  Cardiovascular: RRR, no m/r/g.  GI: Soft, nt, nd, +BS.  Skin/Integumen: R AC region with some firmness, bruising noted, stable to improved.  Other:        Data   Recent Labs   Lab 20  0610 20  0629 20  1043   WBC 5.5 4.2 4.2 4.6   HGB 11.0* 11.4* 11.0* 10.7*   MCV 53* 55* 55* 57*    233 227 210    142  --  137   POTASSIUM 3.8 3.6  --  3.7   CHLORIDE 110* 112*  --  108   CO2 25 23  --  24   BUN 6* 4*  --  3*   CR 0.64* 0.68  --  0.64*   ANIONGAP 5 7  --  5   MAGALI 8.8 9.1  --  8.5   GLC 84 95  --  95   ALBUMIN  --  3.3*  --   --    PROTTOTAL  --  7.5  --   --    BILITOTAL  --  0.4  --   --    ALKPHOS  --  66  --   --    ALT  --  22  --   --    AST  --  13  --   --      Recent Labs   Lab Test 20  0610 20  0629 20  1043 20  0551 20  0139   GLC 84 95 95 86 88         Recent Results (from the past 24 hour(s))   Transesophageal Echocardiogram    Narrative    [id number]  [id number]  [id number]^JAM^JOSSIE^UDAY           [facility]  Echocardiography Laboratory  [address] [place], [place] [address]        Name: RUDY VIVAS  MRN: [id number]  : 1995  Study Date: 2020 02:35 PM  Age: 24 yrs  Gender: Male  Patient Location: [facility]  Reason For Study: Endocarditis  Ordering Physician: JOSSIE POLK  Performed By: Dimitris Parsons     BSA: 2.2 m2  Height: 72  in  Weight: 212 lb  HR: 111  BP: 114/77 mmHg  _____________________________________________________________________________  __        Procedure  Complete SKYE Adult. SKYE Probe serial #[id number] was used during the procedure.  The heart rate, respiratory rate and response to care were monitored  throughout the procedure with the assistance of the nurse.  _____________________________________________________________________________  __        Interpretation Summary     No vegetations.  _____________________________________________________________________________  __        SKYE  I determined this patient to be an appropriate candidate for the planned  sedation and procedure and have reassessed the patient immediately prior to  sedation and procedure. Total sedation time: 48 minutes of continuous bedside  1:1 monitoring. Versed (10mg) was given intravenously. Fentanyl (200mcg) was  given intravenously. Consent to the procedure was obtained prior to sedation.  There were no complications associated with this procedure. The  transesophageal probe was passed without difficulty.     Left Ventricle  The left ventricle is normal in size. There is normal left ventricular wall  thickness. Left ventricular systolic function is normal. No regional wall  motion abnormalities noted.     Right Ventricle  Normal right ventricle structure and size.     Atria  Normal left atrial size. Right atrial size is normal. There is no atrial shunt  seen. No thrombus is detected in the left atrial appendage.        Mitral Valve  The mitral valve leaflets appear normal. There is no evidence of stenosis,  fluttering, or prolapse.     Tricuspid Valve  Normal tricuspid valve.     Aortic Valve  Normal tricuspid aortic valve.     Pulmonic Valve  The pulmonic valve is normal in structure and function.     Vessels  The aortic root is normal size.        Pericardial/Pleural  The pericardium appears normal.     Rhythm  Sinus rhythm was  noted.  _____________________________________________________________________________  __                                Report approved by: Carlos Zepeda 05/06/2020 03:54 PM                    _____________________________________________________________________________  __          Medications   All medications were reviewed.      buprenorphine HCl-naloxone HCl  1 Film Sublingual BID     ceFAZolin  2 g Intravenous Q8H     nicotine  1 patch Transdermal Daily     nicotine   Transdermal Q8H     sodium chloride (PF)  3 mL Intracatheter Q8H

## 2020-05-07 NOTE — PROGRESS NOTES
[facility]    Infectious Disease Progress Note    Date of Service (when I saw the patient): 05/07/2020     Assessment & Plan   Jcarlos Maravilla is a 24 year old male who was admitted on 5/5/2020.     Impression:  1. 24 y.o male with IV heroin use.   2. Admitted with right antecubital superficial thrombophlebitis.   3. Bacteremic with MSSA. So faro nly 1/4 cultures.   4. On ancef.   5. TTE negative.      Recommendations:   1. Continue Ancef   2. Get daily blood cultures till clears   3. Avoid any long term IVlines for now.   4. TTE and SKYE negative for endocarditis.   5. Given MSSA bacteremia anticipate 4 weeks total of IV antibiotics           Bekah Ackerman MD    Interval History   Repeat cultures are negative so far   No new complaints     Physical Exam   Temp: 97.8  F (36.6  C) Temp src: Oral BP: 112/55 Pulse: 56 Heart Rate: 78 Resp: 16 SpO2: 97 % O2 Device: None (Room air)    Vitals:    05/06/20 0500 05/07/20 0630   Weight: 96.4 kg (212 lb 9.6 oz) 96.3 kg (212 lb 3.2 oz)     Vital Signs with Ranges  Temp:  [97.3  F (36.3  C)-97.8  F (36.6  C)] 97.8  F (36.6  C)  Pulse:  [] 56  Heart Rate:  [78-91] 78  Resp:  [16] 16  BP: (100-152)/(55-97) 112/55  SpO2:  [97 %-100 %] 97 %    Constitutional: Awake, alert, cooperative, no apparent distress  Lungs: Clear to auscultation bilaterally, no crackles or wheezing  Cardiovascular: Regular rate and rhythm, normal S1 and S2, and no murmur noted  Abdomen: Normal bowel sounds, soft, non-distended, non-tender  Skin: No rashes, no cyanosis, no edema  Other:    Medications       buprenorphine HCl-naloxone HCl  1 Film Sublingual BID     ceFAZolin  2 g Intravenous Q8H     nicotine  1 patch Transdermal Daily     nicotine   Transdermal Q8H     sodium chloride (PF)  3 mL Intracatheter Q8H       Data   All microbiology laboratory data reviewed.  Recent Labs   Lab Test 05/07/20  0610 05/06/20  0629 05/05/20 1954   WBC 5.5 4.2 4.2   HGB 11.0* 11.4* 11.0*   HCT 33.8*  36.3* 35.1*   MCV 53* 55* 55*    233 227     Recent Labs   Lab Test 05/07/20  0610 05/06/20  0629 05/05/20  1043   CR 0.64* 0.68 0.64*     No lab results found.  Recent Labs   Lab Test 05/06/20  2335 05/06/20  1129 05/05/20  1106 05/05/20  1043 05/04/20  0138 05/04/20  0046   CULT No growth after 5 hours No growth after 15 hours No growth after 2 days No growth after 2 days No growth after 3 days Cultured on the 1st day of incubation:  Staphylococcus aureus  *  Critical Value/Significant Value, preliminary result only, called to and read back by  Dr Mims, On call hospitalist, 5/5/20 @ 0239 TF    (Note)  POSITIVE for STAPHYLOCOCCUS AUREUS and NEGATIVE for the mecA gene  (not MRSA) by iCrumzigene multiplex nucleic acid test. The mecA gene was  not detected. Final identification and antimicrobial susceptibility  testing will be verified by standard methods.    Specimen tested with Verigene multiplex, gram-positive blood culture  nucleic acid test for the following targets: Staph aureus, Staph  epidermidis, Staph lugdunensis, other Staph species, Enterococcus  faecalis, Enterococcus faecium, Streptococcus species, S. agalactiae,  S. anginosus grp., S. pneumoniae, S. pyogenes, Listeria sp., mecA  (methicillin resistance) and Rico/B (vancomycin resistance).    Critical Value/Significant Value called to and read back by DR YURI MIMS ON CALL HOSPITALIST [facility] 0603 05.05.20 CF         Attestation:  Total time on the floor involved in the patient's care: 35 minutes. Total time spent in counseling/care coordination: >50%

## 2020-05-07 NOTE — PLAN OF CARE
VSS on RA, denies any chest pain or SOB. Up independently in room. Tolerating oral intake. IV abx given. Will continue to monitor.

## 2020-05-07 NOTE — PLAN OF CARE
Pt A&O, VSS on RA. IND in room. Tele - NSR. Denies pain. Krzysztof patch on R deltoid. Monitored RUE, didn't note an increase of swelling or change.

## 2020-05-08 PROCEDURE — 25000125 ZZHC RX 250: Performed by: NURSE PRACTITIONER

## 2020-05-08 PROCEDURE — 25000132 ZZH RX MED GY IP 250 OP 250 PS 637: Performed by: PSYCHIATRY & NEUROLOGY

## 2020-05-08 PROCEDURE — 25000128 H RX IP 250 OP 636: Performed by: NURSE PRACTITIONER

## 2020-05-08 PROCEDURE — 27211406 ZZ H KIT CATH IV 18 OR 20G CM, POWERGLIDE W MAX BARRIER

## 2020-05-08 PROCEDURE — 25000132 ZZH RX MED GY IP 250 OP 250 PS 637: Performed by: NURSE PRACTITIONER

## 2020-05-08 PROCEDURE — 40000257 ZZH STATISTIC CONSULT NO CHARGE VASC ACCESS

## 2020-05-08 PROCEDURE — 99232 SBSQ HOSP IP/OBS MODERATE 35: CPT | Performed by: INTERNAL MEDICINE

## 2020-05-08 PROCEDURE — 12000000 ZZH R&B MED SURG/OB

## 2020-05-08 PROCEDURE — 25000132 ZZH RX MED GY IP 250 OP 250 PS 637: Performed by: INTERNAL MEDICINE

## 2020-05-08 PROCEDURE — 36569 INSJ PICC 5 YR+ W/O IMAGING: CPT

## 2020-05-08 RX ADMIN — CEFAZOLIN SODIUM 2 G: 2 INJECTION, SOLUTION INTRAVENOUS at 07:07

## 2020-05-08 RX ADMIN — Medication 1 MG: at 21:51

## 2020-05-08 RX ADMIN — BUPRENORPHINE HYDROCHLORIDE, NALOXONE HYDROCHLORIDE 1 FILM: 4; 1 FILM, SOLUBLE BUCCAL; SUBLINGUAL at 21:48

## 2020-05-08 RX ADMIN — BUPRENORPHINE HYDROCHLORIDE, NALOXONE HYDROCHLORIDE 1 FILM: 4; 1 FILM, SOLUBLE BUCCAL; SUBLINGUAL at 08:44

## 2020-05-08 RX ADMIN — CEFAZOLIN SODIUM 2 G: 2 INJECTION, SOLUTION INTRAVENOUS at 15:38

## 2020-05-08 RX ADMIN — NICOTINE 1 PATCH: 21 PATCH, EXTENDED RELEASE TRANSDERMAL at 08:44

## 2020-05-08 RX ADMIN — LIDOCAINE HYDROCHLORIDE ANHYDROUS 1 ML: 10 INJECTION, SOLUTION INFILTRATION at 14:59

## 2020-05-08 ASSESSMENT — ACTIVITIES OF DAILY LIVING (ADL)
ADLS_ACUITY_SCORE: 10

## 2020-05-08 NOTE — PLAN OF CARE
Patient is A&O x4. On tele with SR. Denies chest pain or SOB. Up independent. Voiding per BR. Midline in place on LUE. Nicotine patch in place. Will continue to monitor.

## 2020-05-08 NOTE — PROGRESS NOTES
SW:  D: Sent SANJEEV for [facility] and spoke to Michelle with [employer] regarding application. She needed to know where patient overdosed and SAIRA confirmed that when patient left [facility] he went back to [facility]'s house and overdosed in that [place]. Michelle will be following and waiting for [place] application to come back. SAIRA checked in with patient who had not yet completed the application. Michelle's contact information is [phone] or [email or web address]..    Patient completed paperwork and it was faxed back to Carlin at [facility] at [phone]. Documents put on front of patient's chart.     I: Will continue to follow    DELANO Mckeon    [facility]

## 2020-05-08 NOTE — PROGRESS NOTES
[facility]    Infectious Disease Progress Note    Date of Service (when I saw the patient): 05/08/2020     Assessment & Plan   Jcarlos Maravilla is a 24 year old male who was admitted on 5/5/2020.     Impression:  1. 24 y.o male with IV heroin use.   2. Admitted with right antecubital superficial thrombophlebitis.   3. Bacteremic with MSSA. So faro nly 1/4 cultures.   4. On ancef.   5. TTE negative.      Recommendations:   1. Continue Ancef   2. TTE and SKYE negative for endocarditis.   3. Given MSSA bacteremia anticipate 4 weeks total of IV antibiotics  day 4/28 today.   4. Can stop daily blood cultures.          Bekah Ackerman MD    Interval History   Repeat cultures are negative so far   No new complaints     Physical Exam   Temp: 97.9  F (36.6  C) Temp src: Oral BP: 99/53 Pulse: 65 Heart Rate: 65 Resp: 16 SpO2: 97 % O2 Device: None (Room air)    Vitals:    05/06/20 0500 05/07/20 0630 05/08/20 0628   Weight: 96.4 kg (212 lb 9.6 oz) 96.3 kg (212 lb 3.2 oz) 96.3 kg (212 lb 6.4 oz)     Vital Signs with Ranges  Temp:  [97.9  F (36.6  C)-98.3  F (36.8  C)] 97.9  F (36.6  C)  Pulse:  [65-68] 65  Heart Rate:  [65-88] 65  Resp:  [16] 16  BP: ()/(53-66) 99/53  SpO2:  [95 %-98 %] 97 %    Constitutional: Awake, alert, cooperative, no apparent distress  Lungs: Clear to auscultation bilaterally, no crackles or wheezing  Cardiovascular: Regular rate and rhythm, normal S1 and S2, and no murmur noted  Abdomen: Normal bowel sounds, soft, non-distended, non-tender  Skin: No rashes, no cyanosis, no edema  Other:    Medications       buprenorphine HCl-naloxone HCl  1 Film Sublingual BID     ceFAZolin  2 g Intravenous Q8H     nicotine  1 patch Transdermal Daily     nicotine   Transdermal Q8H     sodium chloride (PF)  3 mL Intracatheter Q8H       Data   All microbiology laboratory data reviewed.  Recent Labs   Lab Test 05/07/20  0610 05/06/20  0629 05/05/20  1954   WBC 5.5 4.2 4.2   HGB 11.0* 11.4* 11.0*   HCT 33.8*  36.3* 35.1*   MCV 53* 55* 55*    233 227     Recent Labs   Lab Test 05/07/20  0610 05/06/20  0629 05/05/20  1043   CR 0.64* 0.68 0.64*     No lab results found.  Recent Labs   Lab Test 05/06/20  2335 05/06/20  1129 05/05/20  1106 05/05/20  1043 05/04/20  0138 05/04/20  0046   CULT No growth after 1 day No growth after 2 days No growth after 3 days No growth after 3 days No growth after 4 days Cultured on the 1st day of incubation:  Staphylococcus aureus  *  Critical Value/Significant Value, preliminary result only, called to and read back by  Dr Mims, On call hospitalist, 5/5/20 @ 0239 TF    (Note)  POSITIVE for STAPHYLOCOCCUS AUREUS and NEGATIVE for the mecA gene  (not MRSA) by CellEraigene multiplex nucleic acid test. The mecA gene was  not detected. Final identification and antimicrobial susceptibility  testing will be verified by standard methods.    Specimen tested with Verigene multiplex, gram-positive blood culture  nucleic acid test for the following targets: Staph aureus, Staph  epidermidis, Staph lugdunensis, other Staph species, Enterococcus  faecalis, Enterococcus faecium, Streptococcus species, S. agalactiae,  S. anginosus grp., S. pneumoniae, S. pyogenes, Listeria sp., mecA  (methicillin resistance) and Rico/B (vancomycin resistance).    Critical Value/Significant Value called to and read back by DR YURI MIMS ON CALL HOSPITALIST [facility] 0603 05.05.20 CF         Attestation:  Total time on the floor involved in the patient's care: 35 minutes. Total time spent in counseling/care coordination: >50%

## 2020-05-08 NOTE — PROGRESS NOTES
[facility]    Internal Medicine Hospitalist Progress Note  05/08/2020  I evaluated patient on the above date.    Joey Guerrero Jr., MD  [phone] (p)  Text Page        Assessment & Plan New actions/orders today (05/08/2020) are underlined.    Jcarlos Maravilla is a 24 year old male with a past medical history including IV heroin, who was initially hospitalization at [facility] 5/3-5/4 with febrile illness associated with IV heroin usage. However, after discharge, blood cultures became positive for MSSA and pt was subsequently directly admitted to [facility] 5/5/2020 for further workup including SKYE.    MSSA bacteremia, suspect related to IV drug use.  * Initially presented to [facility] evening 5/3/2020 following encouragement from police officers to seek evaluation following use of heroin with his friend, who overdosed. On initial evaluation there, was febrile, with signs of sepsis with elevated lactic acid, procalcitonin 1.22. Subsequently admitted and treated with broad antibiotics. COVID-19 PCR 5/4 negative. TTE 5/4 negative for vegetation. Sepsis resolved and pt was discharged 5/4 on Augmentin (for thrombophlebitis). However, on 5/5, blood cultures from 5/3 came back positive for MSSA and pt sent to [facility] ED for repeat BC's and subsequently directly admitted to [facility] 5/5.   * BC's 5/5 NGTD. Started on cefazolin on admit 5/5. SKYE 5/6 negative for vegetations. ID consulted. BC's 5/6 NGTD.  - Continue cefazolin (started 5/5) - plan 4 weeks of IV antibiotics (stop 6/2/2020); will likely remain hospitalized here to complete the course unless accepting TCU can be found (unlikely).  - Place midline.  - Monitor cultures.  - Appreciate ID help, I d/w Dr. Ackerman 5/8.    Superficial occlusive thrombophlebitis of right median cubital vein.  * Diagnosed by US at [facility] 5/4. Was started on Eliquis at [facility] 5/4 and discharged on Augmentin. Did not fill scripts yet after discharge.  *  Thrombophlebitis improved despite not continuing Eliquis after recent discharge. Improved after admission off anticoagulation.  - Continue off anticoagulation.  - Monitor site of thrombophlebitis, if worsens, could consider restarting AC.  - On antibiotics as above.    IVDU - heroin.  * Patient reported using IV heroin use daily. He started using heroin 2 years ago and prefers IV injections. He admits to using around 1-2 gm daily. He had an accidental overdose 3/2020 requiring 12 mg of narcan. Patients father was a known drug user and  of an overdose. He reportedly was working on getting enrolled in [employer] in [place] but had been waiting due to lack of insurance.   * On admit, patient expressed interest in Suboxone. Seen by Psychiatry consulted  and started on Suboxone.  - Continue Suboxone - plan continue at discharge, consult Psychiatry to order for discharge, appreciate help.  - Continue PRN clonidine 0.1 mg QID for withdrawal symptoms.  - Continue PRN loperamide.  - Follow-up CD treatment after discharge.    Anemia, suspect chronic disease.  Pt anemic 10-11 range noted during recent hospitalization. No obvious signs of blood loss. Hgb 11.0 on admit . 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  Recent Labs   Lab 20  0610 20  0629 20  1954 20  1043 20  0551 20  0139   HGB 11.0* 11.4* 11.0* 10.7* 10.3* 11.6*   - Monitor CBC.  - Consider prbc transfusion if hgb </= 7.0 or if significant bleeding with hemodynamic instability or if symptomatic.    Nicotine dependence  Patient reported smoking 1.5 PPD. Started on nicotine patch.  - Continue nicotine 21 mg patch.      Diet: Regular Diet Adult    Prophylaxis: PCD's, ambulation.   Cook Catheter: not present  Code Status: Full Code      Disposition Plan   Expected discharge: after 2020, recommended to prior living arrangement after completion of IV antibiotics  (home not an option given IV drug use; TCU's unlikely to accept pt given IV drug use).  Entered: Joey Guerrero MD 05/08/2020, 10:50 AM     Communication.  - I updated pt's grandmother 5/6.      Interval History   Doing better. No cravings with Suboxone, working very well. R antecubital site swelling continues to improve.    -Data reviewed today: I reviewed all new labs and imaging over the last 24 hours. I personally reviewed no images or EKG's today.    Physical Exam   Heart Rate: 65, Blood pressure 99/53, pulse 65, temperature 97.9  F (36.6  C), temperature source Oral, resp. rate 16, weight 96.3 kg (212 lb 6.4 oz), SpO2 97 %.  Vitals:    05/06/20 0500 05/07/20 0630 05/08/20 0628   Weight: 96.4 kg (212 lb 9.6 oz) 96.3 kg (212 lb 3.2 oz) 96.3 kg (212 lb 6.4 oz)     Vital Signs with Ranges  Temp:  [97.9  F (36.6  C)-98.3  F (36.8  C)] 97.9  F (36.6  C)  Pulse:  [65-68] 65  Heart Rate:  [65-88] 65  Resp:  [16] 16  BP: ()/(53-66) 99/53  SpO2:  [95 %-98 %] 97 %  Patient Vitals for the past 24 hrs:   BP Temp Temp src Pulse Heart Rate Resp SpO2 Weight   05/08/20 0929 -- -- -- -- -- 16 -- --   05/08/20 0830 -- -- -- -- -- 16 -- --   05/08/20 0740 99/53 97.9  F (36.6  C) Oral 65 65 16 97 % --   05/08/20 0628 -- -- -- -- -- -- -- 96.3 kg (212 lb 6.4 oz)   05/08/20 0517 105/56 98.3  F (36.8  C) Oral -- 65 16 96 % --   05/07/20 2321 99/53 98  F (36.7  C) Oral -- 77 16 97 % --   05/07/20 1941 100/55 98.3  F (36.8  C) Oral -- 88 16 98 % --   05/07/20 1538 106/62 98.2  F (36.8  C) Oral -- 73 16 97 % --   05/07/20 1336 112/66 97.9  F (36.6  C) -- 68 -- 16 95 % --     I/O's Last 24 hours  I/O last 3 completed shifts:  In: 200 [P.O.:200]  Out: -     Constitutional: Awake, alert, pleasant.  Respiratory: Diminished in bases. No crackles or wheezes.  Cardiovascular: RRR, no m/r/g.  GI: .  Skin/Integumen: R AC region with improved swelling/firmness and bruising.  Other:        Data   Recent Labs   Lab 05/07/20  0610  05/06/20  0629 05/05/20  1954 05/05/20  1043   WBC 5.5 4.2 4.2 4.6   HGB 11.0* 11.4* 11.0* 10.7*   MCV 53* 55* 55* 57*    233 227 210    142  --  137   POTASSIUM 3.8 3.6  --  3.7   CHLORIDE 110* 112*  --  108   CO2 25 23  --  24   BUN 6* 4*  --  3*   CR 0.64* 0.68  --  0.64*   ANIONGAP 5 7  --  5   MAGALI 8.8 9.1  --  8.5   GLC 84 95  --  95   ALBUMIN  --  3.3*  --   --    PROTTOTAL  --  7.5  --   --    BILITOTAL  --  0.4  --   --    ALKPHOS  --  66  --   --    ALT  --  22  --   --    AST  --  13  --   --      Recent Labs   Lab Test 05/07/20  0610 05/06/20  0629 05/05/20  1043 05/04/20  0551 05/04/20  0139   GLC 84 95 95 86 88         No results found for this or any previous visit (from the past 24 hour(s)).    Medications   All medications were reviewed.      buprenorphine HCl-naloxone HCl  1 Film Sublingual BID     ceFAZolin  2 g Intravenous Q8H     nicotine  1 patch Transdermal Daily     nicotine   Transdermal Q8H     sodium chloride (PF)  3 mL Intracatheter Q8H

## 2020-05-09 PROCEDURE — 25000132 ZZH RX MED GY IP 250 OP 250 PS 637: Performed by: INTERNAL MEDICINE

## 2020-05-09 PROCEDURE — 99232 SBSQ HOSP IP/OBS MODERATE 35: CPT | Performed by: INTERNAL MEDICINE

## 2020-05-09 PROCEDURE — 12000000 ZZH R&B MED SURG/OB

## 2020-05-09 PROCEDURE — 25000128 H RX IP 250 OP 636: Performed by: NURSE PRACTITIONER

## 2020-05-09 PROCEDURE — 25000132 ZZH RX MED GY IP 250 OP 250 PS 637: Performed by: PSYCHIATRY & NEUROLOGY

## 2020-05-09 PROCEDURE — 40000239 ZZH STATISTIC VAT ROUNDS

## 2020-05-09 PROCEDURE — 99232 SBSQ HOSP IP/OBS MODERATE 35: CPT | Performed by: PSYCHIATRY & NEUROLOGY

## 2020-05-09 RX ORDER — CLONIDINE HYDROCHLORIDE 0.1 MG/1
0.1 TABLET ORAL AT BEDTIME
Status: DISCONTINUED | OUTPATIENT
Start: 2020-05-09 | End: 2020-06-03 | Stop reason: HOSPADM

## 2020-05-09 RX ORDER — BUPRENORPHINE AND NALOXONE 4; 1 MG/1; MG/1
1 FILM, SOLUBLE BUCCAL; SUBLINGUAL 3 TIMES DAILY
Status: DISCONTINUED | OUTPATIENT
Start: 2020-05-09 | End: 2020-06-01

## 2020-05-09 RX ADMIN — CEFAZOLIN SODIUM 2 G: 2 INJECTION, SOLUTION INTRAVENOUS at 09:05

## 2020-05-09 RX ADMIN — BUPRENORPHINE HYDROCHLORIDE, NALOXONE HYDROCHLORIDE 1 FILM: 4; 1 FILM, SOLUBLE BUCCAL; SUBLINGUAL at 22:10

## 2020-05-09 RX ADMIN — CLONIDINE HYDROCHLORIDE 0.1 MG: 0.1 TABLET ORAL at 22:10

## 2020-05-09 RX ADMIN — NICOTINE 1 PATCH: 21 PATCH, EXTENDED RELEASE TRANSDERMAL at 09:05

## 2020-05-09 RX ADMIN — CEFAZOLIN SODIUM 2 G: 2 INJECTION, SOLUTION INTRAVENOUS at 16:15

## 2020-05-09 RX ADMIN — BUPRENORPHINE HYDROCHLORIDE, NALOXONE HYDROCHLORIDE 1 FILM: 4; 1 FILM, SOLUBLE BUCCAL; SUBLINGUAL at 16:15

## 2020-05-09 RX ADMIN — CEFAZOLIN SODIUM 2 G: 2 INJECTION, SOLUTION INTRAVENOUS at 01:09

## 2020-05-09 RX ADMIN — BUPRENORPHINE HYDROCHLORIDE, NALOXONE HYDROCHLORIDE 1 FILM: 4; 1 FILM, SOLUBLE BUCCAL; SUBLINGUAL at 10:31

## 2020-05-09 ASSESSMENT — ACTIVITIES OF DAILY LIVING (ADL)
ADLS_ACUITY_SCORE: 10

## 2020-05-09 NOTE — PLAN OF CARE
Patient A&O x4. On tele with SR. Up independent in room. Denies chest pain or SOB or pain in general. Voiding and had BM this shift per pt report. Midline discontinued per vascular RN, updated hospitalist. Nicotine patch on R arm. Will continue to monitor.

## 2020-05-09 NOTE — PROGRESS NOTES
[facility]    Infectious Disease Progress Note    Date of Service (when I saw the patient): 05/09/2020     Assessment & Plan   Jcarlos Maravilla is a 24 year old male who was admitted on 5/5/2020.     Impression:  1. 24 y.o male with IV heroin use.   2. Admitted with right antecubital superficial thrombophlebitis.   3. Bacteremic with MSSA. So faro nly 1/4 cultures.   4. On ancef.   5. TTE negative.      Recommendations:   1. Continue Ancef   2. TTE and SKYE negative for endocarditis.   3. Given MSSA bacteremia anticipate 4 weeks total of IV antibiotics  day 5/28 today.   4. Can stop daily blood cultures.    midline Ok  Multiple dispositon issues      Luis Alberto Reveles MD    Interval History   Repeat cultures are negative so far   No new complaints     Physical Exam   Temp: 97.7  F (36.5  C) Temp src: Oral BP: 102/67 Pulse: 62 Heart Rate: 55 Resp: 16 SpO2: 98 % O2 Device: Nasal cannula    Vitals:    05/06/20 0500 05/07/20 0630 05/08/20 0628   Weight: 96.4 kg (212 lb 9.6 oz) 96.3 kg (212 lb 3.2 oz) 96.3 kg (212 lb 6.4 oz)     Vital Signs with Ranges  Temp:  [97.7  F (36.5  C)-98.1  F (36.7  C)] 97.7  F (36.5  C)  Pulse:  [62-86] 62  Heart Rate:  [55-83] 55  Resp:  [16] 16  BP: (102-124)/(67-77) 102/67  SpO2:  [96 %-99 %] 98 %    Constitutional: Awake, alert, cooperative, no apparent distress  Lungs: Clear to auscultation bilaterally, no crackles or wheezing  Cardiovascular: Regular rate and rhythm, normal S1 and S2, and no murmur noted  Abdomen: Normal bowel sounds, soft, non-distended, non-tender  Skin: No rashes, no cyanosis, no edema  Other:    Medications       buprenorphine HCl-naloxone HCl  1 Film Sublingual TID     ceFAZolin  2 g Intravenous Q8H     cloNIDine  0.1 mg Oral At Bedtime     nicotine  1 patch Transdermal Daily     nicotine   Transdermal Q8H     sodium chloride (PF)  10 mL Intracatheter Q8H     sodium chloride (PF)  3 mL Intracatheter Q8H       Data   All microbiology laboratory data  reviewed.  Recent Labs   Lab Test 05/07/20  0610 05/06/20  0629 05/05/20  1954   WBC 5.5 4.2 4.2   HGB 11.0* 11.4* 11.0*   HCT 33.8* 36.3* 35.1*   MCV 53* 55* 55*    233 227     Recent Labs   Lab Test 05/07/20  0610 05/06/20  0629 05/05/20  1043   CR 0.64* 0.68 0.64*     No lab results found.  Recent Labs   Lab Test 05/06/20  2335 05/06/20  1129 05/05/20  1106 05/05/20  1043 05/04/20  0138 05/04/20  0046   CULT No growth after 2 days No growth after 3 days No growth after 4 days No growth after 4 days No growth after 5 days Cultured on the 1st day of incubation:  Staphylococcus aureus  *  Critical Value/Significant Value, preliminary result only, called to and read back by  Dr Mims, On call hospitalist, 5/5/20 @ 0239 TF    (Note)  POSITIVE for STAPHYLOCOCCUS AUREUS and NEGATIVE for the mecA gene  (not MRSA) by [employer] multiplex nucleic acid test. The mecA gene was  not detected. Final identification and antimicrobial susceptibility  testing will be verified by standard methods.    Specimen tested with [employer] multiplex, gram-positive blood culture  nucleic acid test for the following targets: Staph aureus, Staph  epidermidis, Staph lugdunensis, other Staph species, Enterococcus  faecalis, Enterococcus faecium, Streptococcus species, S. agalactiae,  S. anginosus grp., S. pneumoniae, S. pyogenes, Listeria sp., mecA  (methicillin resistance) and Rico/B (vancomycin resistance).    Critical Value/Significant Value called to and read back by DR YURI MIMS ON CALL HOSPITALIST [facility] [id number] 05.05.20 CF         Attestation:  Total time on the floor involved in the patient's care: 35 minutes. Total time spent in counseling/care coordination: >50%

## 2020-05-09 NOTE — CONSULTS
[facility] Psychiatric Progress Note      Interval History:   Pt seen on station 55. Cooperative and pleasant.  Is on suboxone 4 mg bid for opiate abuse.  He prefers 8 mg bid. I suggested we go up to 4 mg tid and also keep him on clonidine for cravings or withdrawal.  He says he needs to be  Here for at least 4 week for IV antibiotics.  Used IV heroin.  Going to [facility] after this.     Review of systems:   The Review of Systems is negative other than noted in the HPI     Medications:     Current Facility-Administered Medications   Medication     acetaminophen (TYLENOL) Suppository 650 mg     acetaminophen (TYLENOL) tablet 650 mg     buprenorphine HCl-naloxone HCl (SUBOXONE) 4-1 MG per film 1 Film     ceFAZolin (ANCEF) intermittent infusion 2 g in 100 mL dextrose PRE-MIX     cloNIDine (CATAPRES) tablet 0.1 mg     lidocaine (LMX4) cream     lidocaine 1 % 0.1-1 mL     loperamide (IMODIUM) capsule 2 mg     melatonin tablet 1 mg     naloxone (NARCAN) injection 0.1-0.4 mg     nicotine (NICODERM CQ) 21 MG/24HR 24 hr patch 1 patch     nicotine Patch in Place     ondansetron (ZOFRAN-ODT) ODT tab 4 mg    Or     ondansetron (ZOFRAN) injection 4 mg     polyethylene glycol (MIRALAX) Packet 17 g     prochlorperazine (COMPAZINE) injection 10 mg    Or     prochlorperazine (COMPAZINE) tablet 10 mg    Or     prochlorperazine (COMPAZINE) Suppository 25 mg     senna-docusate (SENOKOT-S/PERICOLACE) 8.6-50 MG per tablet 1 tablet    Or     senna-docusate (SENOKOT-S/PERICOLACE) 8.6-50 MG per tablet 2 tablet     sodium chloride (PF) 0.9% PF flush 10 mL     sodium chloride (PF) 0.9% PF flush 10-20 mL     sodium chloride (PF) 0.9% PF flush 3 mL     sodium chloride (PF) 0.9% PF flush 3 mL         Mental Status Examination:     Appearance:  awake, alert  Eye Contact:  fair  Speech:  clear, coherent  Psychomotor Behavior:  no evidence of tardive dyskinesia, dystonia, or tics  Mood:  better  Affect:  appropriate and in  normal range  Thought Process:  logical no loose associations  Thought Content:  no evidence of suicidal ideation or homicidal ideation  Oriented to:  time, person, and place  Attention Span and Concentration:  intact  Recent and Remote Memory:  intact  Fund of Knowledge: appropriate  Muscle Strength and Tone: normal  Gait and Station: Normal  Insight:  fair  Judgment:  fair        Labs:   No results found for this or any previous visit (from the past 24 hour(s)).      Impression:   Opiate abuse      DIagnoses:     Axis I: Opiate abuse    Axis II: deferred    Axis III: see medical    Axis IV: social stressors, substance abuse    Axis V: 40         Plan:   1. Written information given on medications. Side effects, risks, benefits reviewed.  2. Increase suboxone 4 mg tid  3. Clonidine 0.1 mg po qhs  4. [facility]  5. Follow up with myself Dr Pepper for outpatient suboxone management [id number]      Attestation:  Patient has been seen and evaluated by me,  Tom Pepper MD  Total amount of time: 30 minutes

## 2020-05-09 NOTE — PROGRESS NOTES
Midline placed within the last 24 hours. Discontinued due to being kinked within vessel. RN aware he needs 4 weeks of antibiotics while in hospital. Currently has PIV. Will assess again for Midline when pt has discharge plan in place.

## 2020-05-09 NOTE — PROGRESS NOTES
[facility]    Internal Medicine Hospitalist Progress Note  05/09/2020  I evaluated patient on the above date.      Assessment & Plan New actions/orders today (05/09/2020) are underlined.    Jcarlos Maravilla is a 24 year old male with a past medical history including IV heroin, who was initially hospitalization at [facility] 5/3-5/4 with febrile illness associated with IV heroin usage. However, after discharge, blood cultures became positive for MSSA and pt was subsequently directly admitted to [facility] 5/5/2020 for further workup including SKYE.    MSSA bacteremia, suspect related to IV drug use.  * Initially presented to [facility] evening 5/3/2020 following encouragement from police officers to seek evaluation following use of heroin with his friend, who overdosed. On initial evaluation there, was febrile, with signs of sepsis with elevated lactic acid, procalcitonin 1.22. Subsequently admitted and treated with broad antibiotics. COVID-19 PCR 5/4 negative. TTE 5/4 negative for vegetation. Sepsis resolved and pt was discharged 5/4 on Augmentin (for thrombophlebitis). However, on 5/5, blood cultures from 5/3 came back positive for MSSA and pt sent to [facility] ED for repeat BC's and subsequently directly admitted to [facility] 5/5.   * BC's 5/5 NGTD. Started on cefazolin on admit 5/5. TTE 05/04 and SKYE 5/6 negative for vegetations. ID consulted. BC's 5/6 and 05/07 NGTD.  - Continue cefazolin (started 5/5) - plan 4 weeks of IV antibiotics (stop 6/2/2020); will likely remain hospitalized here to complete the course unless accepting TCU can be found (unlikely).  - Midline placed on 05/08 but kinked later on so removed  - currently had an PIV  - Monitor cultures.  - Appreciate ID help    Superficial occlusive thrombophlebitis of right median cubital vein.  * Diagnosed by US at [facility] 5/4. Was started on Eliquis at [facility] 5/4 and discharged on Augmentin. Did not fill scripts yet after discharge.  *  Thrombophlebitis improved despite not continuing Eliquis after recent discharge. Improved after admission off anticoagulation.  - Continue off anticoagulation.  - Monitor site of thrombophlebitis, if worsens, could consider restarting AC.  - On antibiotics as above.    IVDU - heroin.  * Patient reported using IV heroin use daily. He started using heroin 2 years ago and prefers IV injections. He admits to using around 1-2 gm daily. He had an accidental overdose 3/2020 requiring 12 mg of narcan. Patients father was a known drug user and  of an overdose. He reportedly was working on getting enrolled in [employer] in [place] but had been waiting due to lack of insurance.   * On admit, patient expressed interest in Suboxone. Seen by Psychiatry consulted  and started on Suboxone.  - Psych reconsulted today- currently on Subaxone 1 film TID  - Clonidine changed from prn to 0.1 mg po at bedtime   - plan continue at discharge, consult Psychiatry to order for discharge, appreciate help..  - Continue PRN loperamide.  - Follow-up CD treatment after discharge.    Anemia, suspect chronic disease.  Pt anemic 10-11 range noted during recent hospitalization. No obvious signs of blood loss. Hgb 11.0 on admit . 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  Recent Labs   Lab 20  0610 20  0629 20  1954 20  1043 20  0551 20  0139   HGB 11.0* 11.4* 11.0* 10.7* 10.3* 11.6*   - Monitor CBC.  - Consider prbc transfusion if hgb </= 7.0 or if significant bleeding with hemodynamic instability or if symptomatic.    Nicotine dependence  Patient reported smoking 1.5 PPD. Started on nicotine patch.  - Continue nicotine 21 mg patch.      Diet: Regular Diet Adult    Prophylaxis: PCD's, ambulation.   Cook Catheter: not present  Code Status: Full Code      Disposition Plan   Expected discharge: after 2020, recommended to prior living arrangement  after completion of IV antibiotics (home not an option given IV drug use; TCU's unlikely to accept pt given IV drug use).  Entered: Ilene Beltran MD 05/09/2020, 12:13 PM     Communication.  - discussed with SW.     Interval History    Doing fine, no chest pain, no SOB, no abd pain, no N/V, no abd pain.    -Data reviewed today: I reviewed all new labs and imaging over the last 24 hours. I personally reviewed no images or EKG's today.    Physical Exam   Heart Rate: 55, Blood pressure 102/67, pulse 62, temperature 97.7  F (36.5  C), temperature source Oral, resp. rate 16, weight 96.3 kg (212 lb 6.4 oz), SpO2 98 %.  Vitals:    05/06/20 0500 05/07/20 0630 05/08/20 0628   Weight: 96.4 kg (212 lb 9.6 oz) 96.3 kg (212 lb 3.2 oz) 96.3 kg (212 lb 6.4 oz)     Vital Signs with Ranges  Temp:  [97.7  F (36.5  C)-98.1  F (36.7  C)] 97.7  F (36.5  C)  Pulse:  [62-85] 62  Heart Rate:  [55-76] 55  Resp:  [16] 16  BP: (102-124)/(67-77) 102/67  SpO2:  [96 %-98 %] 98 %  Patient Vitals for the past 24 hrs:   BP Temp Temp src Pulse Heart Rate Resp SpO2   05/09/20 1035 -- -- -- -- -- 16 --   05/09/20 0915 -- -- -- -- -- 16 --   05/09/20 0736 102/67 97.7  F (36.5  C) Oral -- 55 16 98 %   05/09/20 0018 106/69 98  F (36.7  C) Oral 62 61 16 97 %   05/08/20 2027 124/77 98.1  F (36.7  C) Oral 76 76 16 96 %   05/08/20 1730 -- -- -- -- -- 16 --   05/08/20 1610 119/72 98  F (36.7  C) Oral 85 -- 16 97 %   05/08/20 1530 -- -- -- -- -- 16 --   05/08/20 1243 -- -- -- -- -- 16 --     I/O's Last 24 hours  I/O last 3 completed shifts:  In: 360 [P.O.:360]  Out: -     Constitutional: Awake, alert, pleasant.  Respiratory: Bilateral air entry, no wheezing, no crackles or wheezes.  Cardiovascular: S1S2, RRR, no m/r/g.  GI: abd s0ft, nonT, nonD, BS present  Skin/Integumen: R AC region with improved swelling/firmness and bruising.  Extremities- no leg swellings        Data   Recent Labs   Lab 05/07/20  0610 05/06/20  0629 05/05/20  1954 05/05/20  1043    WBC 5.5 4.2 4.2 4.6   HGB 11.0* 11.4* 11.0* 10.7*   MCV 53* 55* 55* 57*    233 227 210    142  --  137   POTASSIUM 3.8 3.6  --  3.7   CHLORIDE 110* 112*  --  108   CO2 25 23  --  24   BUN 6* 4*  --  3*   CR 0.64* 0.68  --  0.64*   ANIONGAP 5 7  --  5   MAGALI 8.8 9.1  --  8.5   GLC 84 95  --  95   ALBUMIN  --  3.3*  --   --    PROTTOTAL  --  7.5  --   --    BILITOTAL  --  0.4  --   --    ALKPHOS  --  66  --   --    ALT  --  22  --   --    AST  --  13  --   --      Recent Labs   Lab Test 05/07/20  0610 05/06/20  0629 05/05/20  1043 05/04/20  0551 05/04/20  0139   GLC 84 95 95 86 88         No results found for this or any previous visit (from the past 24 hour(s)).    Medications   All medications were reviewed.      buprenorphine HCl-naloxone HCl  1 Film Sublingual TID     ceFAZolin  2 g Intravenous Q8H     cloNIDine  0.1 mg Oral At Bedtime     nicotine  1 patch Transdermal Daily     nicotine   Transdermal Q8H     sodium chloride (PF)  10 mL Intracatheter Q8H     sodium chloride (PF)  3 mL Intracatheter Q8H

## 2020-05-09 NOTE — PLAN OF CARE
Pt A/Ox4. VSS on RA. CMS intact. Denies pain, SOB/CP. On IV ABX. Up independently. Tolerating diet. Continue to monitor.

## 2020-05-10 LAB
BACTERIA SPEC CULT: NO GROWTH
SPECIMEN SOURCE: NORMAL

## 2020-05-10 PROCEDURE — 25000132 ZZH RX MED GY IP 250 OP 250 PS 637: Performed by: INTERNAL MEDICINE

## 2020-05-10 PROCEDURE — 99232 SBSQ HOSP IP/OBS MODERATE 35: CPT | Performed by: INTERNAL MEDICINE

## 2020-05-10 PROCEDURE — 25000128 H RX IP 250 OP 636: Performed by: NURSE PRACTITIONER

## 2020-05-10 PROCEDURE — 12000000 ZZH R&B MED SURG/OB

## 2020-05-10 PROCEDURE — 25000132 ZZH RX MED GY IP 250 OP 250 PS 637: Performed by: PSYCHIATRY & NEUROLOGY

## 2020-05-10 PROCEDURE — 25000132 ZZH RX MED GY IP 250 OP 250 PS 637: Performed by: NURSE PRACTITIONER

## 2020-05-10 RX ORDER — DIAPER,BRIEF,INFANT-TODD,DISP
EACH MISCELLANEOUS 3 TIMES DAILY PRN
Status: DISCONTINUED | OUTPATIENT
Start: 2020-05-10 | End: 2020-06-03 | Stop reason: HOSPADM

## 2020-05-10 RX ORDER — DIPHENHYDRAMINE HCL 25 MG
25 CAPSULE ORAL EVERY 6 HOURS PRN
Status: DISCONTINUED | OUTPATIENT
Start: 2020-05-10 | End: 2020-05-28

## 2020-05-10 RX ADMIN — BUPRENORPHINE HYDROCHLORIDE, NALOXONE HYDROCHLORIDE 1 FILM: 4; 1 FILM, SOLUBLE BUCCAL; SUBLINGUAL at 21:04

## 2020-05-10 RX ADMIN — NICOTINE 1 PATCH: 21 PATCH, EXTENDED RELEASE TRANSDERMAL at 09:24

## 2020-05-10 RX ADMIN — Medication 1 MG: at 01:10

## 2020-05-10 RX ADMIN — CEFAZOLIN SODIUM 2 G: 2 INJECTION, SOLUTION INTRAVENOUS at 09:24

## 2020-05-10 RX ADMIN — CEFAZOLIN SODIUM 2 G: 2 INJECTION, SOLUTION INTRAVENOUS at 17:30

## 2020-05-10 RX ADMIN — BUPRENORPHINE HYDROCHLORIDE, NALOXONE HYDROCHLORIDE 1 FILM: 4; 1 FILM, SOLUBLE BUCCAL; SUBLINGUAL at 17:29

## 2020-05-10 RX ADMIN — CEFAZOLIN SODIUM 2 G: 2 INJECTION, SOLUTION INTRAVENOUS at 01:10

## 2020-05-10 RX ADMIN — BUPRENORPHINE HYDROCHLORIDE, NALOXONE HYDROCHLORIDE 1 FILM: 4; 1 FILM, SOLUBLE BUCCAL; SUBLINGUAL at 09:24

## 2020-05-10 RX ADMIN — CLONIDINE HYDROCHLORIDE 0.1 MG: 0.1 TABLET ORAL at 21:05

## 2020-05-10 RX ADMIN — DIPHENHYDRAMINE HYDROCHLORIDE 25 MG: 25 CAPSULE ORAL at 17:30

## 2020-05-10 RX ADMIN — HYDROCORTISONE: 10 OINTMENT TOPICAL at 13:59

## 2020-05-10 ASSESSMENT — ACTIVITIES OF DAILY LIVING (ADL)
ADLS_ACUITY_SCORE: 10

## 2020-05-10 NOTE — PROGRESS NOTES
[facility]    Internal Medicine Hospitalist Progress Note  05/10/2020  I evaluated patient on the above date.      Assessment & Plan   Jcarlos Maravilla is a 24 year old male with a past medical history including IV heroin, who was initially hospitalization at [facility] 5/3-5/4 with febrile illness associated with IV heroin usage. However, after discharge, blood cultures became positive for MSSA and pt was subsequently directly admitted to [facility] 5/5/2020 for further workup including SKYE.    MSSA bacteremia, suspect related to IV drug use.  * Initially presented to [facility] evening 5/3/2020 following encouragement from police officers to seek evaluation following use of heroin with his friend, who overdosed. On initial evaluation there, was febrile, with signs of sepsis with elevated lactic acid, procalcitonin 1.22. Subsequently admitted and treated with broad antibiotics. COVID-19 PCR 5/4 negative. TTE 5/4 negative for vegetation. Sepsis resolved and pt was discharged 5/4 on Augmentin (for thrombophlebitis). However, on 5/5, blood cultures from 5/3 came back positive for MSSA and pt sent to [facility] ED for repeat BC's and subsequently directly admitted to [facility] 5/5.   * Started on cefazolin on admit 5/5. TTE 05/04 and SKYE 5/6 negative for vegetations. ID consulted. BC's from 5/5, 5/6 and 05/07 NGTD.  - Continue cefazolin (started 5/5) - plan 4 weeks of IV antibiotics (stop 6/2/2020); will likely remain hospitalized here to complete the course unless accepting TCU can be found (unlikely); day 6/28 today  - Midline placed on 05/08 but kinked later on so removed  - currently had an PIV  - Monitor cultures.  - Appreciate ID help    Superficial occlusive thrombophlebitis of right median cubital vein.  * Diagnosed by US at [facility] 5/4. Was started on Eliquis at [facility] 5/4 and discharged on Augmentin. Did not fill scripts yet after discharge.  * Thrombophlebitis improved despite not  continuing Eliquis after recent discharge. Improved after admission off anticoagulation.  - Continue off anticoagulation.  - Monitor site of thrombophlebitis, if worsens, could consider restarting AC.  - On antibiotics as above.    IVDU - heroin.  * Patient reported using IV heroin use daily. He started using heroin 2 years ago and prefers IV injections. He admits to using around 1-2 gm daily. He had an accidental overdose 3/2020 requiring 12 mg of narcan. Patients father was a known drug user and  of an overdose. He reportedly was working on getting enrolled in [employer] [employer] in [place] but had been waiting due to lack of insurance.   * On admit, patient expressed interest in Suboxone. Seen by Psychiatry consulted  and started on Suboxone.  - Psych reconsulted on - currently on Subaxone 1 film TID  - Clonidine changed from prn to 0.1 mg po at bedtime   - plan continue at discharge, consult Psychiatry to order for discharge, appreciate help..  - Continue PRN loperamide.  - Follow-up CD treatment after discharge.    Anemia, suspect chronic disease.  Pt anemic 10-11 range noted during recent hospitalization. No obvious signs of blood loss. Hgb 11.0 on admit . 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  Recent Labs   Lab 20  0610 20  0629 20  1954 20  1043 20  0551 20  0139   HGB 11.0* 11.4* 11.0* 10.7* 10.3* 11.6*   - Monitor CBC.  - Consider prbc transfusion if hgb </= 7.0 or if significant bleeding with hemodynamic instability or if symptomatic.    Nicotine dependence  Patient reported smoking 1.5 PPD. Started on nicotine patch.  - Continue nicotine 21 mg patch.    Skin rash  - noted to have a rash on his forearms bilateral and left sided chest, the rash is itchy  - contact dermatitis vs drug-induced?  - cortisone cream TID prn, Benadryl po prn  - monitor for now.      Diet: Regular Diet Adult    Prophylaxis:  PCD's, ambulation.   Cook Catheter: not present  Code Status: Full Code      Disposition Plan   Expected discharge: after 6/2/2020, recommended to prior living arrangement after completion of IV antibiotics (home not an option given IV drug use; TCU's unlikely to accept pt given IV drug use).  Entered: Ilene Beltran MD 05/10/2020, 1:00 PM     Communication.  - discussed with RN    Interval History    Reported the rash on his forearms which apparently had been present for few days, noted the rash on his left side of the chest today; no other events, no chest pain, no SOB, no abd pain, no N/V, no abd pain.    -Data reviewed today: I reviewed all new labs and imaging over the last 24 hours. I personally reviewed no images or EKG's today.    Physical Exam   Heart Rate: 67, Blood pressure 119/69, pulse 69, temperature 98.4  F (36.9  C), temperature source Oral, resp. rate 16, weight 96.3 kg (212 lb 6.4 oz), SpO2 98 %.  Vitals:    05/06/20 0500 05/07/20 0630 05/08/20 0628   Weight: 96.4 kg (212 lb 9.6 oz) 96.3 kg (212 lb 3.2 oz) 96.3 kg (212 lb 6.4 oz)     Vital Signs with Ranges  Temp:  [98  F (36.7  C)-98.4  F (36.9  C)] 98.4  F (36.9  C)  Pulse:  [69-79] 69  Heart Rate:  [67-80] 67  Resp:  [16] 16  BP: (115-128)/(68-86) 119/69  SpO2:  [97 %-99 %] 98 %  Patient Vitals for the past 24 hrs:   BP Temp Temp src Pulse Heart Rate Resp SpO2   05/10/20 0920 119/69 98.4  F (36.9  C) Oral 69 67 16 98 %   05/10/20 0915 -- -- -- -- -- 16 --   05/09/20 2207 116/68 98.2  F (36.8  C) Oral 79 80 16 97 %   05/09/20 1715 -- -- -- -- -- 16 --   05/09/20 1628 -- -- -- -- -- 16 --   05/09/20 1547 115/73 98  F (36.7  C) Oral -- 77 16 97 %   05/09/20 1414 -- -- -- -- -- 16 --   05/09/20 1340 128/86 98.2  F (36.8  C) Oral -- 79 16 99 %     I/O's Last 24 hours  I/O last 3 completed shifts:  In: 480 [P.O.:480]  Out: -     Constitutional: Awake, alert, pleasant.  Respiratory: Bilateral air entry, no wheezing, no crackles or  wheezes.  Cardiovascular: S1S2, RRR, no m/r/g.  GI: abd s0ft, nonT, nonD, BS present  Skin/Integumen: R AC region with improved swelling/firmness and bruising; macular rash over his forearms bilateral and left sided chest wall.  Extremities- no leg swellings        Data   Recent Labs   Lab 05/07/20  0610 05/06/20  0629 05/05/20  1954 05/05/20  1043   WBC 5.5 4.2 4.2 4.6   HGB 11.0* 11.4* 11.0* 10.7*   MCV 53* 55* 55* 57*    233 227 210    142  --  137   POTASSIUM 3.8 3.6  --  3.7   CHLORIDE 110* 112*  --  108   CO2 25 23  --  24   BUN 6* 4*  --  3*   CR 0.64* 0.68  --  0.64*   ANIONGAP 5 7  --  5   MAGALI 8.8 9.1  --  8.5   GLC 84 95  --  95   ALBUMIN  --  3.3*  --   --    PROTTOTAL  --  7.5  --   --    BILITOTAL  --  0.4  --   --    ALKPHOS  --  66  --   --    ALT  --  22  --   --    AST  --  13  --   --      Recent Labs   Lab Test 05/07/20  0610 05/06/20  0629 05/05/20  1043 05/04/20  0551 05/04/20  0139   GLC 84 95 95 86 88         No results found for this or any previous visit (from the past 24 hour(s)).    Medications   All medications were reviewed.      buprenorphine HCl-naloxone HCl  1 Film Sublingual TID     ceFAZolin  2 g Intravenous Q8H     cloNIDine  0.1 mg Oral At Bedtime     nicotine  1 patch Transdermal Daily     nicotine   Transdermal Q8H     sodium chloride (PF)  10 mL Intracatheter Q8H     sodium chloride (PF)  3 mL Intracatheter Q8H

## 2020-05-10 NOTE — PLAN OF CARE
Pt A/Ox4. VSS on RA. CMS intact. On IV abx. Up independently. Denies pain, SOB/CP. Continue to monitor.

## 2020-05-10 NOTE — PLAN OF CARE
Pt A&O, VSS on RA. No c/o pain. Tolerating Reg diet. IND in room. Voiding adequate. IV SL. Noted rash and itchiness today on bilateral forearms and on L chest. Pt stated this has gone on for a couple of days. Applied cortisone x1, benadryl is also available.

## 2020-05-11 LAB
BACTERIA SPEC CULT: NO GROWTH
BACTERIA SPEC CULT: NO GROWTH
SPECIMEN SOURCE: NORMAL
SPECIMEN SOURCE: NORMAL

## 2020-05-11 PROCEDURE — 12000000 ZZH R&B MED SURG/OB

## 2020-05-11 PROCEDURE — 25000128 H RX IP 250 OP 636: Performed by: NURSE PRACTITIONER

## 2020-05-11 PROCEDURE — 25000132 ZZH RX MED GY IP 250 OP 250 PS 637: Performed by: INTERNAL MEDICINE

## 2020-05-11 PROCEDURE — 25000132 ZZH RX MED GY IP 250 OP 250 PS 637: Performed by: NURSE PRACTITIONER

## 2020-05-11 PROCEDURE — 25000132 ZZH RX MED GY IP 250 OP 250 PS 637: Performed by: PSYCHIATRY & NEUROLOGY

## 2020-05-11 PROCEDURE — 99231 SBSQ HOSP IP/OBS SF/LOW 25: CPT | Performed by: INTERNAL MEDICINE

## 2020-05-11 RX ADMIN — Medication 1 MG: at 00:10

## 2020-05-11 RX ADMIN — BUPRENORPHINE HYDROCHLORIDE, NALOXONE HYDROCHLORIDE 1 FILM: 4; 1 FILM, SOLUBLE BUCCAL; SUBLINGUAL at 16:19

## 2020-05-11 RX ADMIN — DIPHENHYDRAMINE HYDROCHLORIDE 25 MG: 25 CAPSULE ORAL at 00:10

## 2020-05-11 RX ADMIN — BUPRENORPHINE HYDROCHLORIDE, NALOXONE HYDROCHLORIDE 1 FILM: 4; 1 FILM, SOLUBLE BUCCAL; SUBLINGUAL at 10:43

## 2020-05-11 RX ADMIN — CEFAZOLIN SODIUM 2 G: 2 INJECTION, SOLUTION INTRAVENOUS at 00:12

## 2020-05-11 RX ADMIN — CLONIDINE HYDROCHLORIDE 0.1 MG: 0.1 TABLET ORAL at 21:08

## 2020-05-11 RX ADMIN — CEFAZOLIN SODIUM 2 G: 2 INJECTION, SOLUTION INTRAVENOUS at 10:43

## 2020-05-11 RX ADMIN — CEFAZOLIN SODIUM 2 G: 2 INJECTION, SOLUTION INTRAVENOUS at 16:19

## 2020-05-11 RX ADMIN — BUPRENORPHINE HYDROCHLORIDE, NALOXONE HYDROCHLORIDE 1 FILM: 4; 1 FILM, SOLUBLE BUCCAL; SUBLINGUAL at 21:09

## 2020-05-11 ASSESSMENT — ACTIVITIES OF DAILY LIVING (ADL)
ADLS_ACUITY_SCORE: 10

## 2020-05-11 NOTE — PROGRESS NOTES
CROSS COVER:    Paged regarding telemetry orders,    --Will discontinue telemetry.      Rashid Sparks PA-C

## 2020-05-11 NOTE — PLAN OF CARE
Date/Time  5/11     Service: Medical  Behavior/Aggression tool color: Green  Diagnosis: staph bacteriemia- right AC superficial tropholecithus   POD#:    Mental Status: A&O4  Activity/dangle: ind  Diet: reg  Pain: denies  Cook/Voiding: voiding BR  Tele/Restraints/Iso: N/A  02/LDA: ANIBAL ALBRIGHT  D/C Date: pending  Other Info: Would like to use computer, however there is none available for him to use.

## 2020-05-11 NOTE — PLAN OF CARE
[id number] Pt A/Ox4. VSS on RA. CMS intact. Mild rash on BUE, benadryl given for itchiness with relief. Denies Pain, SOB/CP. Up independently in room. On ABX. Pending placement.

## 2020-05-11 NOTE — PLAN OF CARE
Pt is AOx4, CMS intact, VSS, independent in room, tolerating diet, voiding, no events during shift. Rash and itching improved with benadryl and cortisone cream. Plan for more abx then placement.

## 2020-05-11 NOTE — PROGRESS NOTES
[facility]    Infectious Disease Progress Note    Date of Service (when I saw the patient): 05/11/2020     Assessment & Plan   Jcarlos Maravilla is a 24 year old male who was admitted on 5/5/2020.     Impression:  1. 24 y.o male with IV heroin use.   2. Admitted with right antecubital superficial thrombophlebitis.   3. Bacteremic with MSSA. So faro nly 1/4 cultures.   4. On ancef.   5. TTE negative.      Recommendations:   1. Continue Ancef   2. TTE and SKYE negative for endocarditis.   3. Given MSSA bacteremia anticipate 4 weeks total of IV antibiotics  day 7/28 today.     Multiple dispositon issues      Bekah Ackerman MD    Interval History   Repeat cultures are negative so far   No new complaints     Physical Exam   Temp: 97.8  F (36.6  C) Temp src: Oral BP: 104/55 Pulse: 57 Heart Rate: 55 Resp: 16 SpO2: 97 % O2 Device: None (Room air)    Vitals:    05/07/20 0630 05/08/20 0628 05/11/20 0500   Weight: 96.3 kg (212 lb 3.2 oz) 96.3 kg (212 lb 6.4 oz) 98.9 kg (218 lb)     Vital Signs with Ranges  Temp:  [97.8  F (36.6  C)-98.4  F (36.9  C)] 97.8  F (36.6  C)  Pulse:  [57-87] 57  Heart Rate:  [55-87] 55  Resp:  [16] 16  BP: (104-119)/(55-69) 104/55  SpO2:  [97 %-98 %] 97 %    Constitutional: Awake, alert, cooperative, no apparent distress  Lungs: Clear to auscultation bilaterally, no crackles or wheezing  Cardiovascular: Regular rate and rhythm, normal S1 and S2, and no murmur noted  Abdomen: Normal bowel sounds, soft, non-distended, non-tender  Skin: No rashes, no cyanosis, no edema  Other:    Medications       buprenorphine HCl-naloxone HCl  1 Film Sublingual TID     ceFAZolin  2 g Intravenous Q8H     cloNIDine  0.1 mg Oral At Bedtime     nicotine  1 patch Transdermal Daily     nicotine   Transdermal Q8H     sodium chloride (PF)  10 mL Intracatheter Q8H     sodium chloride (PF)  3 mL Intracatheter Q8H       Data   All microbiology laboratory data reviewed.  Recent Labs   Lab Test 05/07/20  0610  05/06/20  0629 05/05/20  1954   WBC 5.5 4.2 4.2   HGB 11.0* 11.4* 11.0*   HCT 33.8* 36.3* 35.1*   MCV 53* 55* 55*    233 227     Recent Labs   Lab Test 05/07/20  0610 05/06/20  0629 05/05/20  1043   CR 0.64* 0.68 0.64*     No lab results found.  Recent Labs   Lab Test 05/06/20  2335 05/06/20  1129 05/05/20  1106 05/05/20  1043 05/04/20  0138 05/04/20  0046   CULT No growth after 4 days No growth after 5 days No growth No growth No growth Cultured on the 1st day of incubation:  Staphylococcus aureus  *  Critical Value/Significant Value, preliminary result only, called to and read back by  Dr Mims, On call hospitalist, 5/5/20 @ 0239 TF    (Note)  POSITIVE for STAPHYLOCOCCUS AUREUS and NEGATIVE for the mecA gene  (not MRSA) by [employer] multiplex nucleic acid test. The mecA gene was  not detected. Final identification and antimicrobial susceptibility  testing will be verified by standard methods.    Specimen tested with Verigene multiplex, gram-positive blood culture  nucleic acid test for the following targets: Staph aureus, Staph  epidermidis, Staph lugdunensis, other Staph species, Enterococcus  faecalis, Enterococcus faecium, Streptococcus species, S. agalactiae,  S. anginosus grp., S. pneumoniae, S. pyogenes, Listeria sp., mecA  (methicillin resistance) and Rico/B (vancomycin resistance).    Critical Value/Significant Value called to and read back by DR YURI MIMS ON CALL HOSPITALIST [facility] [id number] 05.05.20 CF         Attestation:  Total time on the floor involved in the patient's care: 35 minutes. Total time spent in counseling/care coordination: >50%

## 2020-05-11 NOTE — PROGRESS NOTES
[facility]    Internal Medicine Hospitalist Progress Note  05/11/2020  I evaluated patient on the above date.      Assessment & Plan   Jcarlos Maravilla is a 24 year old male with a past medical history including IV heroin, who was initially hospitalization at [facility] 5/3-5/4 with febrile illness associated with IV heroin usage. However, after discharge, blood cultures became positive for MSSA and pt was subsequently directly admitted to [facility] 5/5/2020 for further workup including SKYE.    MSSA bacteremia, suspect related to IV drug use.  * Initially presented to [facility] evening 5/3/2020 following encouragement from police officers to seek evaluation following use of heroin with his friend, who overdosed. On initial evaluation there, was febrile, with signs of sepsis with elevated lactic acid, procalcitonin 1.22. Subsequently admitted and treated with broad antibiotics. COVID-19 PCR 5/4 negative. TTE 5/4 negative for vegetation. Sepsis resolved and pt was discharged 5/4 on Augmentin (for thrombophlebitis). However, on 5/5, blood cultures from 5/3 came back positive for MSSA and pt sent to [facility] ED for repeat BC's and subsequently directly admitted to [facility] 5/5.   * Started on cefazolin on admit 5/5. TTE 05/04 and SKYE 5/6 negative for vegetations. ID consulted. BC's from 5/5, 5/6 and 05/07 NGTD.  - Continue cefazolin (started 5/5) - plan 4 weeks of IV antibiotics (stop 6/2/2020); will likely remain hospitalized here to complete the course unless accepting TCU can be found (unlikely); day 7/28 today  - Midline placed on 05/08 but kinked later on so removed  - currently had an PIV  - Monitor cultures.  - Appreciate ID help    Superficial occlusive thrombophlebitis of right median cubital vein.  * Diagnosed by US at [facility] 5/4. Was started on Eliquis at [facility] 5/4 and discharged on Augmentin. Did not fill scripts yet after discharge.  * Thrombophlebitis improved despite not  continuing Eliquis after recent discharge. Improved after admission off anticoagulation.  - Continue off anticoagulation.  - Monitor site of thrombophlebitis, if worsens, could consider restarting AC.  - On antibiotics as above.    IVDU - heroin.  * Patient reported using IV heroin use daily. He started using heroin 2 years ago and prefers IV injections. He admits to using around 1-2 gm daily. He had an accidental overdose 3/2020 requiring 12 mg of narcan. Patients father was a known drug user and  of an overdose. He reportedly was working on getting enrolled in [employer] [employer] in [place] but had been waiting due to lack of insurance.   * On admit, patient expressed interest in Suboxone. Seen by Psychiatry consulted  and started on Suboxone.  - Psych reconsulted on - currently on Subaxone 1 film TID  - Clonidine changed from prn to 0.1 mg po at bedtime   - plan continue at discharge, consult Psychiatry to order for discharge, appreciate help..  - Continue PRN loperamide.  - Follow-up CD treatment after discharge.    Anemia, suspect chronic disease.  Pt anemic 10-11 range noted during recent hospitalization. No obvious signs of blood loss. Hgb 11.0 on admit . 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  Recent Labs   Lab 20  0610 20  0629 20  1954 20  1043   HGB 11.0* 11.4* 11.0* 10.7*   - Monitor CBC.  - Consider prbc transfusion if hgb </= 7.0 or if significant bleeding with hemodynamic instability or if symptomatic.    Nicotine dependence  Patient reported smoking 1.5 PPD. Started on nicotine patch.  - Continue nicotine 21 mg patch.    Skin rash  - noted to have a rash on his forearms bilateral and left sided chest, the rash is itchy  - contact dermatitis vs drug-induced?  - cortisone cream TID prn, Benadryl po prn  - the rash on this forearms improved, still present over left side of the chest       Diet: Regular Diet Adult     Prophylaxis: PCD's, ambulation.   Cook Catheter: not present  Code Status: Full Code      Disposition Plan   Expected discharge: after 6/2/2020, recommended to prior living arrangement after completion of IV antibiotics (home not an option given IV drug use; TCU's unlikely to accept pt given IV drug use).  Entered: Ilene Beltran MD 05/11/2020, 1:34 PM     Communication.  - discussed with RN    Interval History    Doing fine, no chest pain, no SOB, no abd pain, no N/V, no abd pain; the rash on this forearms improved, still present over left side of the chest.       -Data reviewed today: I reviewed all new labs and imaging over the last 24 hours. I personally reviewed no images or EKG's today.    Physical Exam   Heart Rate: 63, Blood pressure 121/59, pulse 71, temperature 98.3  F (36.8  C), temperature source Oral, resp. rate 16, weight 98.9 kg (218 lb), SpO2 98 %.  Vitals:    05/07/20 0630 05/08/20 0628 05/11/20 0500   Weight: 96.3 kg (212 lb 3.2 oz) 96.3 kg (212 lb 6.4 oz) 98.9 kg (218 lb)     Vital Signs with Ranges  Temp:  [97.8  F (36.6  C)-98.3  F (36.8  C)] 98.3  F (36.8  C)  Pulse:  [57-87] 71  Heart Rate:  [55-87] 63  Resp:  [16] 16  BP: (104-121)/(55-62) 121/59  SpO2:  [97 %-98 %] 98 %  Patient Vitals for the past 24 hrs:   BP Temp Temp src Pulse Heart Rate Resp SpO2 Weight   05/11/20 1142 121/59 98.3  F (36.8  C) Oral 71 63 16 98 % --   05/11/20 0732 104/55 97.8  F (36.6  C) Oral 57 55 16 97 % --   05/11/20 0500 -- -- -- -- -- -- -- 98.9 kg (218 lb)   05/11/20 0108 105/57 97.8  F (36.6  C) Oral 69 68 16 98 % --   05/10/20 1539 111/62 97.9  F (36.6  C) Oral 87 87 16 98 % --     I/O's Last 24 hours  I/O last 3 completed shifts:  In: 600 [P.O.:600]  Out: -     Constitutional: Awake, alert, pleasant.  Respiratory: Bilateral air entry, no wheezing, no crackles or wheezes.  Cardiovascular: S1S2, RRR, no m/r/g.  GI: abd s0ft, nonT, nonD, BS present  Skin/Integumen: R AC region with improved  swelling/firmness and bruising; macular rash over his forearms bilateral and left sided chest wall.  Extremities- no leg swellings        Data   Recent Labs   Lab 05/07/20  0610 05/06/20  0629 05/05/20  1954 05/05/20  1043   WBC 5.5 4.2 4.2 4.6   HGB 11.0* 11.4* 11.0* 10.7*   MCV 53* 55* 55* 57*    233 227 210    142  --  137   POTASSIUM 3.8 3.6  --  3.7   CHLORIDE 110* 112*  --  108   CO2 25 23  --  24   BUN 6* 4*  --  3*   CR 0.64* 0.68  --  0.64*   ANIONGAP 5 7  --  5   MAGALI 8.8 9.1  --  8.5   GLC 84 95  --  95   ALBUMIN  --  3.3*  --   --    PROTTOTAL  --  7.5  --   --    BILITOTAL  --  0.4  --   --    ALKPHOS  --  66  --   --    ALT  --  22  --   --    AST  --  13  --   --      Recent Labs   Lab Test 05/07/20  0610 05/06/20  0629 05/05/20  1043 05/04/20  0551 05/04/20  0139   GLC 84 95 95 86 88         No results found for this or any previous visit (from the past 24 hour(s)).    Medications   All medications were reviewed.      buprenorphine HCl-naloxone HCl  1 Film Sublingual TID     ceFAZolin  2 g Intravenous Q8H     cloNIDine  0.1 mg Oral At Bedtime     nicotine  1 patch Transdermal Daily     nicotine   Transdermal Q8H     sodium chloride (PF)  10 mL Intracatheter Q8H     sodium chloride (PF)  3 mL Intracatheter Q8H

## 2020-05-12 LAB
BACTERIA SPEC CULT: NO GROWTH
SPECIMEN SOURCE: NORMAL

## 2020-05-12 PROCEDURE — 25000132 ZZH RX MED GY IP 250 OP 250 PS 637: Performed by: NURSE PRACTITIONER

## 2020-05-12 PROCEDURE — 25000128 H RX IP 250 OP 636: Performed by: NURSE PRACTITIONER

## 2020-05-12 PROCEDURE — 25000132 ZZH RX MED GY IP 250 OP 250 PS 637: Performed by: INTERNAL MEDICINE

## 2020-05-12 PROCEDURE — 12000000 ZZH R&B MED SURG/OB

## 2020-05-12 PROCEDURE — 25000132 ZZH RX MED GY IP 250 OP 250 PS 637: Performed by: PSYCHIATRY & NEUROLOGY

## 2020-05-12 PROCEDURE — 99231 SBSQ HOSP IP/OBS SF/LOW 25: CPT | Performed by: INTERNAL MEDICINE

## 2020-05-12 RX ADMIN — DIPHENHYDRAMINE HYDROCHLORIDE 25 MG: 25 CAPSULE ORAL at 15:39

## 2020-05-12 RX ADMIN — CEFAZOLIN SODIUM 2 G: 2 INJECTION, SOLUTION INTRAVENOUS at 16:17

## 2020-05-12 RX ADMIN — Medication 1 MG: at 00:18

## 2020-05-12 RX ADMIN — CEFAZOLIN SODIUM 2 G: 2 INJECTION, SOLUTION INTRAVENOUS at 00:19

## 2020-05-12 RX ADMIN — BUPRENORPHINE HYDROCHLORIDE, NALOXONE HYDROCHLORIDE 1 FILM: 4; 1 FILM, SOLUBLE BUCCAL; SUBLINGUAL at 15:39

## 2020-05-12 RX ADMIN — CLONIDINE HYDROCHLORIDE 0.1 MG: 0.1 TABLET ORAL at 21:41

## 2020-05-12 RX ADMIN — CEFAZOLIN SODIUM 2 G: 2 INJECTION, SOLUTION INTRAVENOUS at 09:05

## 2020-05-12 RX ADMIN — BUPRENORPHINE HYDROCHLORIDE, NALOXONE HYDROCHLORIDE 1 FILM: 4; 1 FILM, SOLUBLE BUCCAL; SUBLINGUAL at 21:41

## 2020-05-12 RX ADMIN — NICOTINE 1 PATCH: 21 PATCH, EXTENDED RELEASE TRANSDERMAL at 09:02

## 2020-05-12 RX ADMIN — BUPRENORPHINE HYDROCHLORIDE, NALOXONE HYDROCHLORIDE 1 FILM: 4; 1 FILM, SOLUBLE BUCCAL; SUBLINGUAL at 09:02

## 2020-05-12 RX ADMIN — DIPHENHYDRAMINE HYDROCHLORIDE 25 MG: 25 CAPSULE ORAL at 09:02

## 2020-05-12 RX ADMIN — DIPHENHYDRAMINE HYDROCHLORIDE 25 MG: 25 CAPSULE ORAL at 00:18

## 2020-05-12 ASSESSMENT — ACTIVITIES OF DAILY LIVING (ADL)
ADLS_ACUITY_SCORE: 10

## 2020-05-12 NOTE — PROGRESS NOTES
[facility]    Internal Medicine Hospitalist Progress Note  05/12/2020  I evaluated patient on the above date.      Assessment & Plan   Jcarlos Maravilla is a 24 year old male with a past medical history including IV heroin, who was initially hospitalization at [facility] 5/3-5/4 with febrile illness associated with IV heroin usage. However, after discharge, blood cultures became positive for MSSA and pt was subsequently directly admitted to [facility] 5/5/2020 for further workup including SKYE.    MSSA bacteremia, suspect related to IV drug use.  * Initially presented to [facility] evening 5/3/2020 following encouragement from police officers to seek evaluation following use of heroin with his friend, who overdosed. On initial evaluation there, was febrile, with signs of sepsis with elevated lactic acid, procalcitonin 1.22. Subsequently admitted and treated with broad antibiotics. COVID-19 PCR 5/4 negative. TTE 5/4 negative for vegetation. Sepsis resolved and pt was discharged 5/4 on Augmentin (for thrombophlebitis). However, on 5/5, blood cultures from 5/3 came back positive for MSSA and pt sent to [facility] ED for repeat BC's and subsequently directly admitted to [facility] 5/5.   * Started on cefazolin on admit 5/5. TTE 05/04 and SKYE 5/6 negative for vegetations. ID consulted. BC's from 5/5, 5/6 and 05/07 NGTD.  - Continue cefazolin (started 5/5) - plan 4 weeks of IV antibiotics (stop 6/2/2020); will likely remain hospitalized here to complete the course unless accepting TCU can be found (unlikely); day 8/28 today  - Midline placed on 05/08 but kinked later on so removed  - currently had an PIV  - Monitor cultures.  - Appreciate ID help    Superficial occlusive thrombophlebitis of right median cubital vein.  * Diagnosed by US at [facility] 5/4. Was started on Eliquis at [facility] 5/4 and discharged on Augmentin. Did not fill scripts yet after discharge.  * Thrombophlebitis improved despite not  continuing Eliquis after recent discharge. Improved after admission off anticoagulation.  - Continue off anticoagulation.  - Monitor site of thrombophlebitis, if worsens, could consider restarting AC.  - On antibiotics as above.    IVDU - heroin.  * Patient reported using IV heroin use daily. He started using heroin 2 years ago and prefers IV injections. He admits to using around 1-2 gm daily. He had an accidental overdose 3/2020 requiring 12 mg of narcan. Patients father was a known drug user and  of an overdose. He reportedly was working on getting enrolled in [employer] [employer] in [place] but had been waiting due to lack of insurance.   * On admit, patient expressed interest in Suboxone. Seen by Psychiatry consulted  and started on Suboxone.  - Psych reconsulted on - currently on Subaxone 1 film TID  - Clonidine changed from prn to 0.1 mg po at bedtime   - plan continue at discharge, consult Psychiatry to order for discharge, appreciate help..  - Continue PRN loperamide.  - Follow-up CD treatment after discharge.    Anemia, suspect chronic disease.  Pt anemic 10-11 range noted during recent hospitalization. No obvious signs of blood loss. Hgb 11.0 on admit . 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  Recent Labs   Lab 20  0610 20  0629 20   HGB 11.0* 11.4* 11.0*   - Monitor CBC.  - Consider prbc transfusion if hgb </= 7.0 or if significant bleeding with hemodynamic instability or if symptomatic.    Nicotine dependence  Patient reported smoking 1.5 PPD. Started on nicotine patch.  - Continue nicotine 21 mg patch.    Skin rash  - noted to have a rash on his forearms bilateral and left sided chest, the rash is itchy  - contact dermatitis vs drug-induced?  - cortisone cream TID prn, Benadryl po prn  - the rash on this forearms improved, still present over left side of the chest       Diet: Regular Diet Adult    Prophylaxis:  PCD's, ambulation.   Cook Catheter: not present  Code Status: Full Code      Disposition Plan   Expected discharge: after 6/2/2020, recommended to prior living arrangement after completion of IV antibiotics (home not an option given IV drug use; TCU's unlikely to accept pt given IV drug use).    Entered: Ilene Beltran MD 05/12/2020, 2:14 PM     Communication.  - discussed with RN    Interval History    Doing fine, no events overnight, no chest pain, no SOB, no abd pain, no N/V, no abd pain; the rash on this forearms improved, still present over left side of the chest.       -Data reviewed today: I reviewed all new labs and imaging over the last 24 hours. I personally reviewed no images or EKG's today.    Physical Exam   Heart Rate: 59, Blood pressure 102/60, pulse 68, temperature 97.6  F (36.4  C), temperature source Oral, resp. rate 16, weight 98 kg (216 lb), SpO2 98 %.  Vitals:    05/08/20 0628 05/11/20 0500 05/12/20 0529   Weight: 96.3 kg (212 lb 6.4 oz) 98.9 kg (218 lb) 98 kg (216 lb)     Vital Signs with Ranges  Temp:  [97.6  F (36.4  C)-98.3  F (36.8  C)] 97.6  F (36.4  C)  Pulse:  [68-77] 68  Heart Rate:  [59-80] 59  Resp:  [16-18] 16  BP: (102-114)/(60-64) 102/60  SpO2:  [96 %-98 %] 98 %  Patient Vitals for the past 24 hrs:   BP Temp Temp src Pulse Heart Rate Resp SpO2 Weight   05/12/20 0746 102/60 97.6  F (36.4  C) Oral 68 59 16 98 % --   05/12/20 0529 -- -- -- -- -- -- -- 98 kg (216 lb)   05/12/20 0014 105/61 98.3  F (36.8  C) Oral -- 80 18 96 % --   05/11/20 1530 114/64 98.3  F (36.8  C) Oral 77 78 16 98 % --     I/O's Last 24 hours  I/O last 3 completed shifts:  In: 240 [P.O.:240]  Out: -     Constitutional: Awake, alert, pleasant.  Respiratory: Bilateral air entry, no wheezing, no crackles or wheezes.  Cardiovascular: S1S2, RRR, no m/r/g.  GI: abd s0ft, nonT, nonD, BS present  Skin/Integumen: R AC region with improved swelling/firmness and bruising; macular rash over his forearms bilateral-  improved and left sided chest wall.  Extremities- no leg swellings        Data   Recent Labs   Lab 05/07/20  0610 05/06/20  0629 05/05/20  1954   WBC 5.5 4.2 4.2   HGB 11.0* 11.4* 11.0*   MCV 53* 55* 55*    233 227    142  --    POTASSIUM 3.8 3.6  --    CHLORIDE 110* 112*  --    CO2 25 23  --    BUN 6* 4*  --    CR 0.64* 0.68  --    ANIONGAP 5 7  --    MAGALI 8.8 9.1  --    GLC 84 95  --    ALBUMIN  --  3.3*  --    PROTTOTAL  --  7.5  --    BILITOTAL  --  0.4  --    ALKPHOS  --  66  --    ALT  --  22  --    AST  --  13  --      Recent Labs   Lab Test 05/07/20  0610 05/06/20  0629 05/05/20  1043 05/04/20  0551 05/04/20  0139   GLC 84 95 95 86 88         No results found for this or any previous visit (from the past 24 hour(s)).    Medications   All medications were reviewed.      buprenorphine HCl-naloxone HCl  1 Film Sublingual TID     ceFAZolin  2 g Intravenous Q8H     cloNIDine  0.1 mg Oral At Bedtime     nicotine  1 patch Transdermal Daily     nicotine   Transdermal Q8H     sodium chloride (PF)  10 mL Intracatheter Q8H     sodium chloride (PF)  3 mL Intracatheter Q8H

## 2020-05-12 NOTE — PLAN OF CARE
Date/Time 5/11/2020  [id number]    Trauma/Ortho/Medical (Choose one) Medical    Diagnosis: Bacteremia  POD#: N/a  Mental Status: A & O x 4  Activity/dangle Independent  Diet: Regular  Pain: Denies  Cook/Voiding: N/a   Tele/Restraints/Iso: N/a  02/LDA: GRACE PHILIP  D/C Date: Discharging after IV ABX completed in June

## 2020-05-12 NOTE — PLAN OF CARE
Pt is AOx4, CMS intact, VSS, denies pain, independent in room, no events during shift. Plan for more abx then placement.

## 2020-05-12 NOTE — PLAN OF CARE
Pt up independently in room.  SL intact to (R) hand.  Pt aware he will be on IV antibiotics for another 3 wks.  Pt has rash on jerad arms and (L) sided chest--using Benadryl for symptoms.

## 2020-05-12 NOTE — PLAN OF CARE
Pt up independently. Denies pain. On IV antibiotic. Benadryl given for rash/itching, resolving. Continue to monitor.

## 2020-05-12 NOTE — PROGRESS NOTES
BRIEF NUTRITION ASSESSMENT    REASON FOR ASSESSMENT:  Jcarlos Maravilla is a 24 year old male seen by Registered Dietitian for [facility]    NUTRITION HISTORY:  - PMH: IV heroin. Presented to [facility] with febrile illness associated with IV heroin use. Admitted to [facility] for MSSA.   - Had been living with his grandma MAGDA.  - Spoke to patient over the phone. He denies any issues related to appetite/intake.     CURRENT DIET AND INTAKE:  Diet: Regular        100% intakes documented. Pt orders 2 meals most days. Earlier in admission pt was only ordering 1 meal (suspect r/t withdrawal)   Patient feels like his appetite is good, reports no issues.     ANTHROPOMETRICS:  Height: 6'  Weight:  96.3 (213 lb 3.2 oz)  Body mass index is 28.9 kg/m .   Weight Status: Overweight BMI 25-29.9  IBW:  80.9 kg  %IBW: 119%  Weight History: in the past year pt has lost up to 37# (15% - not significant)  Wt Readings from Last 10 Encounters:   05/12/20 98 kg (216 lb)   05/04/20 99.8 kg (220 lb)   06/08/19 113.2 kg (249 lb 9 oz)   05/24/16 114 kg (251 lb 4 oz) (>99 %)*   03/24/16 103.4 kg (228 lb) (98 %)*   03/31/14 114.3 kg (252 lb) (>99 %)*   03/21/14 115.2 kg (254 lb) (>99 %)*   05/12/12 104.1 kg (229 lb 6.4 oz) (>99 %)*   02/05/12 99.8 kg (220 lb) (>99 %)*   03/24/11 113.3 kg (249 lb 12.8 oz) (>99 %)*     * Growth percentiles are based on CDC (Boys, 2-20 Years) data.     LABS/MEDS/PHYSICAL FINDINGS:  Reviewed    5/6 - SKYE. No evidence of valvular vegetations.  ABX needed into June. Pt likely to remain admitted until then.     Psych following    MALNUTRITION:  Visual Nutrition Focused Physical Assessment (NFPA) note completed due to departmental precautions to prevent spread of COVID19 during Pandemic.     Do not expect that patient meets two of the criteria for diagnosing malnutrition.     NUTRITION INTERVENTION:  Nutrition Diagnosis:  No nutrition diagnosis at this time.    Implementation:  Nutrition Education:  Patient declines.     FOLLOW  UP/MONITORING:   Will re-evaluate in 7 - 10 days, or sooner, if re-consulted.      Mojgan Rodriguez RD, LD  Pager: [phone]

## 2020-05-13 LAB
BACTERIA SPEC CULT: NO GROWTH
SPECIMEN SOURCE: NORMAL

## 2020-05-13 PROCEDURE — 25000132 ZZH RX MED GY IP 250 OP 250 PS 637: Performed by: INTERNAL MEDICINE

## 2020-05-13 PROCEDURE — 25000132 ZZH RX MED GY IP 250 OP 250 PS 637: Performed by: NURSE PRACTITIONER

## 2020-05-13 PROCEDURE — 99231 SBSQ HOSP IP/OBS SF/LOW 25: CPT | Performed by: INTERNAL MEDICINE

## 2020-05-13 PROCEDURE — 25000132 ZZH RX MED GY IP 250 OP 250 PS 637: Performed by: PSYCHIATRY & NEUROLOGY

## 2020-05-13 PROCEDURE — 25000128 H RX IP 250 OP 636: Performed by: NURSE PRACTITIONER

## 2020-05-13 PROCEDURE — 12000000 ZZH R&B MED SURG/OB

## 2020-05-13 RX ADMIN — CEFAZOLIN SODIUM 2 G: 2 INJECTION, SOLUTION INTRAVENOUS at 01:39

## 2020-05-13 RX ADMIN — CEFAZOLIN SODIUM 2 G: 2 INJECTION, SOLUTION INTRAVENOUS at 16:34

## 2020-05-13 RX ADMIN — BUPRENORPHINE HYDROCHLORIDE, NALOXONE HYDROCHLORIDE 1 FILM: 4; 1 FILM, SOLUBLE BUCCAL; SUBLINGUAL at 08:38

## 2020-05-13 RX ADMIN — NICOTINE 1 PATCH: 21 PATCH, EXTENDED RELEASE TRANSDERMAL at 08:38

## 2020-05-13 RX ADMIN — CEFAZOLIN SODIUM 2 G: 2 INJECTION, SOLUTION INTRAVENOUS at 08:38

## 2020-05-13 RX ADMIN — DIPHENHYDRAMINE HYDROCHLORIDE 25 MG: 25 CAPSULE ORAL at 08:38

## 2020-05-13 RX ADMIN — BUPRENORPHINE HYDROCHLORIDE, NALOXONE HYDROCHLORIDE 1 FILM: 4; 1 FILM, SOLUBLE BUCCAL; SUBLINGUAL at 16:34

## 2020-05-13 RX ADMIN — BUPRENORPHINE HYDROCHLORIDE, NALOXONE HYDROCHLORIDE 1 FILM: 4; 1 FILM, SOLUBLE BUCCAL; SUBLINGUAL at 21:49

## 2020-05-13 RX ADMIN — Medication 1 MG: at 01:38

## 2020-05-13 RX ADMIN — DIPHENHYDRAMINE HYDROCHLORIDE 25 MG: 25 CAPSULE ORAL at 01:38

## 2020-05-13 RX ADMIN — CLONIDINE HYDROCHLORIDE 0.1 MG: 0.1 TABLET ORAL at 21:49

## 2020-05-13 ASSESSMENT — ACTIVITIES OF DAILY LIVING (ADL)
ADLS_ACUITY_SCORE: 10

## 2020-05-13 NOTE — PROGRESS NOTES
[facility]    Internal Medicine Hospitalist Progress Note  05/13/2020  I evaluated patient on the above date.      Assessment & Plan   Jcarlos Maravilla is a 24 year old male with a past medical history including IV heroin, who was initially hospitalization at [facility] 5/3-5/4 with febrile illness associated with IV heroin usage. However, after discharge, blood cultures became positive for MSSA and pt was subsequently directly admitted to [facility] 5/5/2020 for further workup including SKYE.    MSSA bacteremia, suspect related to IV drug use.  * Initially presented to [facility] evening 5/3/2020 following encouragement from police officers to seek evaluation following use of heroin with his friend, who overdosed. On initial evaluation there, was febrile, with signs of sepsis with elevated lactic acid, procalcitonin 1.22. Subsequently admitted and treated with broad antibiotics. COVID-19 PCR 5/4 negative. TTE 5/4 negative for vegetation. Sepsis resolved and pt was discharged 5/4 on Augmentin (for thrombophlebitis). However, on 5/5, blood cultures from 5/3 came back positive for MSSA and pt sent to [facility] ED for repeat BC's and subsequently directly admitted to [facility] 5/5.   * Started on cefazolin on admit 5/5. SKYE 5/6 negative for vegetations. ID consulted. BC's from 5/5, 5/6 and 05/07 NGTD.  - Continue cefazolin (started 5/5) - plan 4 weeks of IV antibiotics (stop 6/2/2020); will likely remain hospitalized here to complete the course unless accepting TCU can be found (unlikely); day 9/28 today  - Midline placed on 05/08 but kinked later on so removed  - currently had an PIV  - Monitor cultures.  - Appreciate ID help    Superficial occlusive thrombophlebitis of right median cubital vein.  * Diagnosed by US at [facility] 5/4. Was started on Eliquis at [facility] 5/4 and discharged on Augmentin. Did not fill scripts yet after discharge.  * Thrombophlebitis improved despite not continuing Eliquis  after recent discharge. Improved after admission off anticoagulation.  - Continue off anticoagulation.  - Monitor site of thrombophlebitis, if worsens, could consider restarting AC.  - On antibiotics as above.    IVDU - heroin.  * Patient reported using IV heroin use daily. He started using heroin 2 years ago and prefers IV injections. He admits to using around 1-2 gm daily. He had an accidental overdose 3/2020 requiring 12 mg of narcan. Patients father was a known drug user and  of an overdose. He reportedly was working on getting enrolled in [employer] [facility] in [place] but had been waiting due to lack of insurance.   * On admit, patient expressed interest in Suboxone. Seen by Psychiatry consulted  and started on Suboxone.  - Psych reconsulted on - currently on Subaxone 1 film TID  - Clonidine changed from prn to 0.1 mg po at bedtime   - plan continue at discharge, consult Psychiatry to order for discharge, appreciate help..  - Continue PRN loperamide.  - Follow-up CD treatment after discharge.    Anemia, suspect chronic disease.  Pt anemic 10-11 range noted during recent hospitalization. No obvious signs of blood loss. Hgb 11.0 on admit . 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  Recent Labs   Lab 20  0610   HGB 11.0*   - Monitor CBC.  - Consider prbc transfusion if hgb </= 7.0 or if significant bleeding with hemodynamic instability or if symptomatic.    Nicotine dependence  Patient reported smoking 1.5 PPD. Started on nicotine patch.  - Continue nicotine 21 mg patch.    Skin rash  - noted to have a rash on his forearms bilateral and left sided chest, the rash is itchy  - contact dermatitis vs drug-induced?  - cortisone cream TID prn, Benadryl po prn  - the rash on this forearms improved, still present over left side of the chest       Diet: Regular Diet Adult    Prophylaxis: PCD's, ambulation.   Cook Catheter: not present  Code Status:  Full Code      Disposition Plan   Expected discharge: after 6/2/2020, recommended to prior living arrangement after completion of IV antibiotics (home not an option given IV drug use; TCU's unlikely to accept pt given IV drug use).    Entered: Ilene Beltran MD 05/13/2020, 3:19 PM     Communication.  - discussed with RN    Interval History    Doing OK, no new events, no chest pain, no SOB, no abd pain, no N/V, no abd pain;     -Data reviewed today: I reviewed all new labs and imaging over the last 24 hours. I personally reviewed no images or EKG's today.    Physical Exam   Heart Rate: 70, Blood pressure 111/62, pulse 78, temperature 98.2  F (36.8  C), temperature source Oral, resp. rate 16, weight 98.1 kg (216 lb 4.8 oz), SpO2 97 %.  Vitals:    05/11/20 0500 05/12/20 0529 05/13/20 0635   Weight: 98.9 kg (218 lb) 98 kg (216 lb) 98.1 kg (216 lb 4.8 oz)     Vital Signs with Ranges  Temp:  [97.5  F (36.4  C)-98.3  F (36.8  C)] 98.2  F (36.8  C)  Pulse:  [78] 78  Heart Rate:  [68-81] 70  Resp:  [16] 16  BP: (105-122)/(58-66) 111/62  SpO2:  [96 %-99 %] 97 %  Patient Vitals for the past 24 hrs:   BP Temp Temp src Pulse Heart Rate Resp SpO2 Weight   05/13/20 1234 111/62 98.2  F (36.8  C) Oral -- 70 16 97 % --   05/13/20 0728 110/66 97.7  F (36.5  C) Oral -- 68 16 99 % --   05/13/20 0635 -- -- -- -- -- -- -- 98.1 kg (216 lb 4.8 oz)   05/13/20 0100 105/58 98.3  F (36.8  C) Oral -- 80 16 96 % --   05/12/20 2226 -- -- -- -- -- 16 -- --   05/12/20 2001 110/62 97.5  F (36.4  C) Oral -- 81 16 96 % --   05/12/20 1528 122/62 98.1  F (36.7  C) Oral 78 77 16 96 % --     I/O's Last 24 hours  No intake/output data recorded.    Constitutional: Awake, alert, pleasant.  Respiratory: Bilateral air entry, no wheezing, no crackles or wheezes.  Cardiovascular: S1S2, RRR, no m/r/g.  GI: abd s0ft, nonT, nonD, BS present  Skin/Integumen: R AC region with improved swelling/firmness and bruising; macular rash over his forearms bilateral-  improved and left sided chest wall.  Extremities- no leg swellings        Data   Recent Labs   Lab 05/07/20  0610   WBC 5.5   HGB 11.0*   MCV 53*         POTASSIUM 3.8   CHLORIDE 110*   CO2 25   BUN 6*   CR 0.64*   ANIONGAP 5   MAGALI 8.8   GLC 84     Recent Labs   Lab Test 05/07/20  0610 05/06/20  0629 05/05/20  1043 05/04/20  0551 05/04/20  0139   GLC 84 95 95 86 88         No results found for this or any previous visit (from the past 24 hour(s)).    Medications   All medications were reviewed.      buprenorphine HCl-naloxone HCl  1 Film Sublingual TID     ceFAZolin  2 g Intravenous Q8H     cloNIDine  0.1 mg Oral At Bedtime     nicotine  1 patch Transdermal Daily     nicotine   Transdermal Q8H     sodium chloride (PF)  3 mL Intracatheter Q8H

## 2020-05-13 NOTE — PLAN OF CARE
Pt A/O, VSS on RA. Independent in cares. Tolerating IV abx, diet and voiding adequately. Rash present on Bilat arms and on L side of abdomen, educated pt to not itch. Call light within reach & bed in lowest/locked position. Will continue to monitor.

## 2020-05-13 NOTE — PROGRESS NOTES
[facility]    Infectious Disease Progress Note    Date of Service (when I saw the patient): 05/14/2020     Assessment & Plan   Jcarlos Maravilla is a 24 year old male who was admitted on 5/5/2020.     Impression:  1. 24 y.o male with IV heroin use.   2. Admitted with right antecubital superficial thrombophlebitis.   3. Bacteremic with MSSA. So faro nly 1/4 cultures.   4. On ancef.   5. TTE negative.      Recommendations:   1. Noted rash on the abscesses and flank along with left UE, could be ancef, stop and start vanco instead   2. TTE and SKYE negative for endocarditis.   3. Given MSSA bacteremia anticipate 4 weeks total of IV antibiotics  day 11/28 today.     Multiple dispositon issues      Bekah Ackerman MD    Interval History    Rash on the torso and UE   Repeat cultures are negative so far   No new complaints     Physical Exam   Temp: 97.7  F (36.5  C) Temp src: Oral BP: 110/66 Pulse: 78 Heart Rate: 68 Resp: 16 SpO2: 99 % O2 Device: None (Room air)    Vitals:    05/11/20 0500 05/12/20 0529 05/13/20 0635   Weight: 98.9 kg (218 lb) 98 kg (216 lb) 98.1 kg (216 lb 4.8 oz)     Vital Signs with Ranges  Temp:  [97.5  F (36.4  C)-98.3  F (36.8  C)] 97.7  F (36.5  C)  Pulse:  [78] 78  Heart Rate:  [68-81] 68  Resp:  [16] 16  BP: (105-122)/(58-66) 110/66  SpO2:  [96 %-99 %] 99 %    Constitutional: Awake, alert, cooperative, no apparent distress  Lungs: Clear to auscultation bilaterally, no crackles or wheezing  Cardiovascular: Regular rate and rhythm, normal S1 and S2, and no murmur noted  Abdomen: Normal bowel sounds, soft, non-distended, non-tender  Skin: Rash on UE and torso   Other:    Medications       buprenorphine HCl-naloxone HCl  1 Film Sublingual TID     ceFAZolin  2 g Intravenous Q8H     cloNIDine  0.1 mg Oral At Bedtime     nicotine  1 patch Transdermal Daily     nicotine   Transdermal Q8H     sodium chloride (PF)  3 mL Intracatheter Q8H       Data   All microbiology laboratory data  reviewed.  Recent Labs   Lab Test 05/07/20  0610 05/06/20  0629 05/05/20  1954   WBC 5.5 4.2 4.2   HGB 11.0* 11.4* 11.0*   HCT 33.8* 36.3* 35.1*   MCV 53* 55* 55*    233 227     Recent Labs   Lab Test 05/07/20  0610 05/06/20  0629 05/05/20  1043   CR 0.64* 0.68 0.64*     No lab results found.  Recent Labs   Lab Test 05/06/20  2335 05/06/20  1129 05/05/20  1106 05/05/20  1043 05/04/20  0138 05/04/20  0046   CULT No growth No growth No growth No growth No growth Cultured on the 1st day of incubation:  Staphylococcus aureus  *  Critical Value/Significant Value, preliminary result only, called to and read back by  Dr Mims, On call hospitalist, 5/5/20 @ 0239 TF    (Note)  POSITIVE for STAPHYLOCOCCUS AUREUS and NEGATIVE for the mecA gene  (not MRSA) by Intent Mediaigene multiplex nucleic acid test. The mecA gene was  not detected. Final identification and antimicrobial susceptibility  testing will be verified by standard methods.    Specimen tested with Verigene multiplex, gram-positive blood culture  nucleic acid test for the following targets: Staph aureus, Staph  epidermidis, Staph lugdunensis, other Staph species, Enterococcus  faecalis, Enterococcus faecium, Streptococcus species, S. agalactiae,  S. anginosus grp., S. pneumoniae, S. pyogenes, Listeria sp., mecA  (methicillin resistance) and Rico/B (vancomycin resistance).    Critical Value/Significant Value called to and read back by DR YURI MIMS ON CALL HOSPITALIST [facility] 0603 05.05.20 CF         Attestation:  Total time on the floor involved in the patient's care: 35 minutes. Total time spent in counseling/care coordination: >50%

## 2020-05-13 NOTE — PROGRESS NOTES
SW:  D: Message received from Carlin at [facility] that the funding source is in the process of being completed for treatment. SAIRA returned the call to inquire if there is an MA application started for him or not. Message left for Carlin.Carlin called back and reported that he is unsure whether or not there is an MA application started for him. Carlin reported that he is unsure. Email sent to Michelle with [facility] to inquire if she is aware of an MA application or not.    P: will continue to follow    DELANO Mckeon    [facility]

## 2020-05-13 NOTE — PLAN OF CARE
Pt up independently. Denies pain. On IV antibiotic. Itching and rash still noted on arms and chest, Benadryl given. Continue to monitor.

## 2020-05-14 LAB
ANION GAP SERPL CALCULATED.3IONS-SCNC: 5 MMOL/L (ref 3–14)
BASOPHILS # BLD AUTO: 0.1 10E9/L (ref 0–0.2)
BASOPHILS NFR BLD AUTO: 1.4 %
BUN SERPL-MCNC: 10 MG/DL (ref 7–30)
CALCIUM SERPL-MCNC: 9.2 MG/DL (ref 8.5–10.1)
CHLORIDE SERPL-SCNC: 104 MMOL/L (ref 94–109)
CO2 SERPL-SCNC: 29 MMOL/L (ref 20–32)
CREAT SERPL-MCNC: 0.62 MG/DL (ref 0.66–1.25)
DIFFERENTIAL METHOD BLD: ABNORMAL
EOSINOPHIL # BLD AUTO: 0.6 10E9/L (ref 0–0.7)
EOSINOPHIL NFR BLD AUTO: 11.1 %
ERYTHROCYTE [DISTWIDTH] IN BLOOD BY AUTOMATED COUNT: 16.6 % (ref 10–15)
GFR SERPL CREATININE-BSD FRML MDRD: >90 ML/MIN/{1.73_M2}
GLUCOSE SERPL-MCNC: 93 MG/DL (ref 70–99)
HCT VFR BLD AUTO: 39.4 % (ref 40–53)
HGB BLD-MCNC: 12.2 G/DL (ref 13.3–17.7)
IMM GRANULOCYTES # BLD: 0 10E9/L (ref 0–0.4)
IMM GRANULOCYTES NFR BLD: 0.2 %
LYMPHOCYTES # BLD AUTO: 2.7 10E9/L (ref 0.8–5.3)
LYMPHOCYTES NFR BLD AUTO: 53.1 %
MCH RBC QN AUTO: 17.5 PG (ref 26.5–33)
MCHC RBC AUTO-ENTMCNC: 31 G/DL (ref 31.5–36.5)
MCV RBC AUTO: 57 FL (ref 78–100)
MONOCYTES # BLD AUTO: 0.9 10E9/L (ref 0–1.3)
MONOCYTES NFR BLD AUTO: 17.8 %
NEUTROPHILS # BLD AUTO: 0.8 10E9/L (ref 1.6–8.3)
NEUTROPHILS NFR BLD AUTO: 16.4 %
NRBC # BLD AUTO: 0 10*3/UL
NRBC BLD AUTO-RTO: 0 /100
PLATELET # BLD AUTO: 344 10E9/L (ref 150–450)
POTASSIUM SERPL-SCNC: 4.3 MMOL/L (ref 3.4–5.3)
RBC # BLD AUTO: 6.96 10E12/L (ref 4.4–5.9)
SODIUM SERPL-SCNC: 138 MMOL/L (ref 133–144)
WBC # BLD AUTO: 5.1 10E9/L (ref 4–11)

## 2020-05-14 PROCEDURE — 12000000 ZZH R&B MED SURG/OB

## 2020-05-14 PROCEDURE — 25000128 H RX IP 250 OP 636: Performed by: INTERNAL MEDICINE

## 2020-05-14 PROCEDURE — 25000132 ZZH RX MED GY IP 250 OP 250 PS 637: Performed by: NURSE PRACTITIONER

## 2020-05-14 PROCEDURE — 25000128 H RX IP 250 OP 636: Performed by: NURSE PRACTITIONER

## 2020-05-14 PROCEDURE — 40000141 ZZH STATISTIC PERIPHERAL IV START W/O US GUIDANCE

## 2020-05-14 PROCEDURE — 25000132 ZZH RX MED GY IP 250 OP 250 PS 637: Performed by: INTERNAL MEDICINE

## 2020-05-14 PROCEDURE — 25000132 ZZH RX MED GY IP 250 OP 250 PS 637: Performed by: PSYCHIATRY & NEUROLOGY

## 2020-05-14 PROCEDURE — 80048 BASIC METABOLIC PNL TOTAL CA: CPT | Performed by: INTERNAL MEDICINE

## 2020-05-14 PROCEDURE — 85025 COMPLETE CBC W/AUTO DIFF WBC: CPT | Performed by: INTERNAL MEDICINE

## 2020-05-14 PROCEDURE — 36415 COLL VENOUS BLD VENIPUNCTURE: CPT | Performed by: INTERNAL MEDICINE

## 2020-05-14 PROCEDURE — 99232 SBSQ HOSP IP/OBS MODERATE 35: CPT | Performed by: INTERNAL MEDICINE

## 2020-05-14 PROCEDURE — 25800030 ZZH RX IP 258 OP 636: Performed by: INTERNAL MEDICINE

## 2020-05-14 RX ADMIN — BUPRENORPHINE HYDROCHLORIDE, NALOXONE HYDROCHLORIDE 1 FILM: 4; 1 FILM, SOLUBLE BUCCAL; SUBLINGUAL at 08:55

## 2020-05-14 RX ADMIN — CLONIDINE HYDROCHLORIDE 0.1 MG: 0.1 TABLET ORAL at 22:13

## 2020-05-14 RX ADMIN — DIPHENHYDRAMINE HYDROCHLORIDE 25 MG: 25 CAPSULE ORAL at 00:09

## 2020-05-14 RX ADMIN — VANCOMYCIN HYDROCHLORIDE 1250 MG: 5 INJECTION, POWDER, LYOPHILIZED, FOR SOLUTION INTRAVENOUS at 17:12

## 2020-05-14 RX ADMIN — NICOTINE 1 PATCH: 21 PATCH, EXTENDED RELEASE TRANSDERMAL at 08:57

## 2020-05-14 RX ADMIN — DIPHENHYDRAMINE HYDROCHLORIDE 25 MG: 25 CAPSULE ORAL at 17:13

## 2020-05-14 RX ADMIN — Medication 1 MG: at 23:41

## 2020-05-14 RX ADMIN — BUPRENORPHINE HYDROCHLORIDE, NALOXONE HYDROCHLORIDE 1 FILM: 4; 1 FILM, SOLUBLE BUCCAL; SUBLINGUAL at 15:53

## 2020-05-14 RX ADMIN — CEFAZOLIN SODIUM 2 G: 2 INJECTION, SOLUTION INTRAVENOUS at 08:55

## 2020-05-14 RX ADMIN — DIPHENHYDRAMINE HYDROCHLORIDE 25 MG: 25 CAPSULE ORAL at 23:41

## 2020-05-14 RX ADMIN — BUPRENORPHINE HYDROCHLORIDE, NALOXONE HYDROCHLORIDE 1 FILM: 4; 1 FILM, SOLUBLE BUCCAL; SUBLINGUAL at 22:12

## 2020-05-14 RX ADMIN — CEFAZOLIN SODIUM 2 G: 2 INJECTION, SOLUTION INTRAVENOUS at 00:09

## 2020-05-14 RX ADMIN — Medication 1 MG: at 00:09

## 2020-05-14 ASSESSMENT — ACTIVITIES OF DAILY LIVING (ADL)
ADLS_ACUITY_SCORE: 10

## 2020-05-14 NOTE — PROGRESS NOTES
[facility]    Internal Medicine Hospitalist Progress Note  05/14/2020  I evaluated patient on the above date.      Assessment & Plan   Jcarlos Maravilla is a 24 year old male with a past medical history including IV heroin, who was initially hospitalization at [facility] 5/3-5/4 with febrile illness associated with IV heroin usage. However, after discharge, blood cultures became positive for MSSA and pt was subsequently directly admitted to [facility] 5/5/2020 for further workup including SKYE.    MSSA bacteremia, suspect related to IV drug use.  * Initially presented to [facility] evening 5/3/2020 following encouragement from police officers to seek evaluation following use of heroin with his friend, who overdosed. On initial evaluation there, was febrile, with signs of sepsis with elevated lactic acid, procalcitonin 1.22. Subsequently admitted and treated with broad antibiotics. COVID-19 PCR 5/4 negative. TTE 5/4 negative for vegetation. Sepsis resolved and pt was discharged 5/4 on Augmentin (for thrombophlebitis). However, on 5/5, blood cultures from 5/3 came back positive for MSSA and pt sent to [facility] ED for repeat BC's and subsequently directly admitted to [facility] 5/5.   * Started on cefazolin on admit 5/5. SKYE 5/6 negative for vegetations. ID consulted. BC's from 5/5, 5/6 and 05/07 NGTD.  - Continue cefazolin (started 5/5) - plan 4 weeks of IV antibiotics (stop 6/2/2020); will likely remain hospitalized here to complete the course unless accepting TCU can be found (unlikely); day 9/28 today  - Midline placed on 05/08 but kinked later on so removed  - currently had an PIV  - Monitor cultures.  - Appreciate ID help    Superficial occlusive thrombophlebitis of right median cubital vein.  * Diagnosed by US at [facility] 5/4. Was started on Eliquis at [facility] 5/4 and discharged on Augmentin. Did not fill scripts yet after discharge.  * Thrombophlebitis improved despite not continuing Eliquis  after recent discharge. Improved after admission off anticoagulation.  - Continue off anticoagulation.  - Monitor site of thrombophlebitis, if worsens, could consider restarting AC.  - On antibiotics as above.    IVDU - heroin.  * Patient reported using IV heroin use daily. He started using heroin 2 years ago and prefers IV injections. He admits to using around 1-2 gm daily. He had an accidental overdose 3/2020 requiring 12 mg of narcan. Patients father was a known drug user and  of an overdose. He reportedly was working on getting enrolled in [employer] [employer] in [place] but had been waiting due to lack of insurance.   * On admit, patient expressed interest in Suboxone. Seen by Psychiatry consulted  and started on Suboxone.  - Psych reconsulted on - currently on Subaxone 1 film TID  - Clonidine changed from prn to 0.1 mg po at bedtime   - plan continue at discharge, consult Psychiatry to order for discharge, appreciate help..  - Continue PRN loperamide.  - Follow-up CD treatment after discharge.    Anemia, suspect chronic disease.  Pt anemic 10-11 range noted during recent hospitalization. No obvious signs of blood loss. Hgb 11.0 on admit . 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  Recent Labs   Lab 20  0603   HGB 12.2*   - Monitor CBC.  - Consider prbc transfusion if hgb </= 7.0 or if significant bleeding with hemodynamic instability or if symptomatic.    Nicotine dependence  Patient reported smoking 1.5 PPD. Started on nicotine patch.  - Continue nicotine 21 mg patch.    Skin rash  - noted to have a rash on his forearms bilateral and left sided chest, the rash is itchy  - contact dermatitis vs drug-induced?  - cortisone cream TID prn, Benadryl po prn  - the rash on this forearms improved, still present over left side of the chest       Diet: Regular Diet Adult    Prophylaxis: PCD's, ambulation.   Cook Catheter: not present  Code Status:  Full Code      Disposition Plan   Expected discharge: after 6/2/2020, recommended to prior living arrangement after completion of IV antibiotics (home not an option given IV drug use; TCU's unlikely to accept pt given IV drug use).    Entered: Gloria Farrell MD 05/14/2020, 1:22 PM     Communication.  - discussed with RN    Interval History    Patient is resting comfortably, he slept well, denies any new complaints.    -Data reviewed today: I reviewed all new labs and imaging over the last 24 hours. I personally reviewed no images or EKG's today.    Physical Exam   Heart Rate: 63, Blood pressure 102/58, pulse 63, temperature 97.7  F (36.5  C), temperature source Oral, resp. rate 16, weight 98 kg (216 lb), SpO2 97 %.  Vitals:    05/12/20 0529 05/13/20 0635 05/14/20 0500   Weight: 98 kg (216 lb) 98.1 kg (216 lb 4.8 oz) 98 kg (216 lb)     Vital Signs with Ranges  Temp:  [97.7  F (36.5  C)-98.5  F (36.9  C)] 97.7  F (36.5  C)  Pulse:  [63-84] 63  Heart Rate:  [63-80] 63  Resp:  [16] 16  BP: ()/(48-65) 102/58  SpO2:  [95 %-97 %] 97 %  Patient Vitals for the past 24 hrs:   BP Temp Temp src Pulse Heart Rate Resp SpO2 Weight   05/14/20 0815 102/58 97.7  F (36.5  C) Oral 63 63 16 97 % --   05/14/20 0500 -- -- -- -- -- -- -- 98 kg (216 lb)   05/14/20 0004 106/53 98.3  F (36.8  C) Oral -- 78 16 96 % --   05/13/20 1957 110/65 98.4  F (36.9  C) Oral -- 80 16 95 % --   05/13/20 1520 91/48 98.5  F (36.9  C) Oral 84 80 16 95 % --     I/O's Last 24 hours  I/O last 3 completed shifts:  In: 240 [P.O.:240]  Out: -     Constitutional: Awake, alert, pleasant.  Respiratory: Bilateral air entry, no wheezing, no crackles or wheezes.  Cardiovascular: S1S2, RRR, no m/r/g.  GI: abd s0ft, nonT, nonD, BS present  Skin/Integumen: R AC region with improved swelling/firmness and bruising; macular rash over his forearms bilateral- improved and left sided chest wall.  Extremities- no leg swellings        Data   Recent Labs   Lab 05/14/20  0603    WBC 5.1   HGB 12.2*   MCV 57*         POTASSIUM 4.3   CHLORIDE 104   CO2 29   BUN 10   CR 0.62*   ANIONGAP 5   MAGALI 9.2   GLC 93     Recent Labs   Lab Test 05/14/20  0603 05/07/20  0610 05/06/20  0629 05/05/20  1043 05/04/20  0551   GLC 93 84 95 95 86         No results found for this or any previous visit (from the past 24 hour(s)).    Medications   All medications were reviewed.      buprenorphine HCl-naloxone HCl  1 Film Sublingual TID     ceFAZolin  2 g Intravenous Q8H     cloNIDine  0.1 mg Oral At Bedtime     nicotine  1 patch Transdermal Daily     nicotine   Transdermal Q8H     sodium chloride (PF)  3 mL Intracatheter Q8H

## 2020-05-14 NOTE — PHARMACY-VANCOMYCIN DOSING SERVICE
Pharmacy Vancomycin Initial Note  Date of Service May 14, 2020  Patient's  1995  24 year old, male    Indication: Bacteremia    Current estimated CrCl = Estimated Creatinine Clearance: 226.8 mL/min (A) (based on SCr of 0.62 mg/dL (L)).    Creatinine for last 3 days  2020:  6:03 AM Creatinine 0.62 mg/dL    Recent Vancomycin Level(s) for last 3 days  No results found for requested labs within last 72 hours.      Vancomycin IV Administrations (past 72 hours)      No vancomycin orders with administrations in past 72 hours.                Nephrotoxins and other renal medications (From now, onward)    Start     Dose/Rate Route Frequency Ordered Stop    20 1600  vancomycin 1250 mg in 0.9% NaCl 250 mL intermittent infusion 1,250 mg      1,250 mg  over 90 Minutes Intravenous EVERY 8 HOURS 20 1559            Contrast Orders - past 72 hours (72h ago, onward)    None                Plan:  1.  Start vancomycin  1250 mg IV q8h.   2.  Goal Trough Level: 15-20 mg/L   3.  Pharmacy will check trough levels as appropriate in 1-3 Days.    4. Serum creatinine levels will be ordered daily for the first week of therapy and at least twice weekly for subsequent weeks.    5. [place] method utilized to dose vancomycin therapy: Method 1    Wandy Alonzo [facility]

## 2020-05-14 NOTE — PLAN OF CARE
Pt A/Ox4. VSS. Up ind in room. Rash on BUE and L side of chest. Tolerating antibiotics. Will continue to monitor.

## 2020-05-14 NOTE — PLAN OF CARE
Pt A&Ox4, VSS, up independently, denies pain, has rash to left upper inner arm and left side, hydrocortisone cream for itching. Rash this afternoon now noted on abdomen and right lower side, antibiotic changed, po benadryl given, IV SL, up independently, voiding, tolerating regular diet.

## 2020-05-15 LAB
CREAT SERPL-MCNC: 0.72 MG/DL (ref 0.66–1.25)
GFR SERPL CREATININE-BSD FRML MDRD: >90 ML/MIN/{1.73_M2}
VANCOMYCIN SERPL-MCNC: 11.4 MG/L

## 2020-05-15 PROCEDURE — 82565 ASSAY OF CREATININE: CPT | Performed by: INTERNAL MEDICINE

## 2020-05-15 PROCEDURE — 36415 COLL VENOUS BLD VENIPUNCTURE: CPT | Performed by: INTERNAL MEDICINE

## 2020-05-15 PROCEDURE — 25000132 ZZH RX MED GY IP 250 OP 250 PS 637: Performed by: INTERNAL MEDICINE

## 2020-05-15 PROCEDURE — 25000128 H RX IP 250 OP 636: Performed by: INTERNAL MEDICINE

## 2020-05-15 PROCEDURE — 25800030 ZZH RX IP 258 OP 636: Performed by: INTERNAL MEDICINE

## 2020-05-15 PROCEDURE — 99232 SBSQ HOSP IP/OBS MODERATE 35: CPT | Performed by: INTERNAL MEDICINE

## 2020-05-15 PROCEDURE — 12000000 ZZH R&B MED SURG/OB

## 2020-05-15 PROCEDURE — 80202 ASSAY OF VANCOMYCIN: CPT | Performed by: INTERNAL MEDICINE

## 2020-05-15 PROCEDURE — 25000132 ZZH RX MED GY IP 250 OP 250 PS 637: Performed by: PSYCHIATRY & NEUROLOGY

## 2020-05-15 RX ADMIN — VANCOMYCIN HYDROCHLORIDE 1250 MG: 5 INJECTION, POWDER, LYOPHILIZED, FOR SOLUTION INTRAVENOUS at 16:38

## 2020-05-15 RX ADMIN — BUPRENORPHINE HYDROCHLORIDE, NALOXONE HYDROCHLORIDE 1 FILM: 4; 1 FILM, SOLUBLE BUCCAL; SUBLINGUAL at 22:43

## 2020-05-15 RX ADMIN — NICOTINE 1 PATCH: 21 PATCH, EXTENDED RELEASE TRANSDERMAL at 08:12

## 2020-05-15 RX ADMIN — BUPRENORPHINE HYDROCHLORIDE, NALOXONE HYDROCHLORIDE 1 FILM: 4; 1 FILM, SOLUBLE BUCCAL; SUBLINGUAL at 16:37

## 2020-05-15 RX ADMIN — VANCOMYCIN HYDROCHLORIDE 1250 MG: 5 INJECTION, POWDER, LYOPHILIZED, FOR SOLUTION INTRAVENOUS at 08:12

## 2020-05-15 RX ADMIN — BUPRENORPHINE HYDROCHLORIDE, NALOXONE HYDROCHLORIDE 1 FILM: 4; 1 FILM, SOLUBLE BUCCAL; SUBLINGUAL at 08:12

## 2020-05-15 RX ADMIN — VANCOMYCIN HYDROCHLORIDE 1250 MG: 5 INJECTION, POWDER, LYOPHILIZED, FOR SOLUTION INTRAVENOUS at 00:00

## 2020-05-15 RX ADMIN — DIPHENHYDRAMINE HYDROCHLORIDE 25 MG: 25 CAPSULE ORAL at 12:14

## 2020-05-15 RX ADMIN — CLONIDINE HYDROCHLORIDE 0.1 MG: 0.1 TABLET ORAL at 22:43

## 2020-05-15 ASSESSMENT — ACTIVITIES OF DAILY LIVING (ADL)
ADLS_ACUITY_SCORE: 10

## 2020-05-15 NOTE — PROGRESS NOTES
[facility]    Infectious Disease Progress Note    Date of Service (when I saw the patient): 05/14/2020     Assessment & Plan   Jcarlos Maravilla is a 24 year old male who was admitted on 5/5/2020.     Impression:  1. 24 y.o male with IV heroin use.   2. Admitted with right antecubital superficial thrombophlebitis.   3. Bacteremic with MSSA. Only 1/4 cultures.   4. On ancef.   5. TTE negative.      Recommendations:   1. Noted rash on the abscesses and flank along with left UE, could be ancef, stoped and started vanco instead   2. TTE and SKYE negative for endocarditis.   3. Given MSSA bacteremia anticipate 4 weeks total of IV antibiotics  day 11/28 today.     Multiple dispositon issues      Bekah Ackerman MD    Interval History    Rash on the torso and UE about the same as yesterday   Repeat cultures are negative so far   No new complaints     Physical Exam   Temp: 97.9  F (36.6  C) Temp src: Oral BP: 111/61 Pulse: 65 Heart Rate: 65 Resp: 16 SpO2: 98 % O2 Device: None (Room air)    Vitals:    05/12/20 0529 05/13/20 0635 05/14/20 0500   Weight: 98 kg (216 lb) 98.1 kg (216 lb 4.8 oz) 98 kg (216 lb)     Vital Signs with Ranges  Temp:  [97.9  F (36.6  C)-98.2  F (36.8  C)] 97.9  F (36.6  C)  Pulse:  [65-78] 65  Heart Rate:  [65-79] 65  Resp:  [16] 16  BP: (110-120)/(56-68) 111/61  SpO2:  [95 %-98 %] 98 %    Constitutional: Awake, alert, cooperative, no apparent distress  Lungs: Clear to auscultation bilaterally, no crackles or wheezing  Cardiovascular: Regular rate and rhythm, normal S1 and S2, and no murmur noted  Abdomen: Normal bowel sounds, soft, non-distended, non-tender  Skin: Rash on UE and torso   Other:    Medications       buprenorphine HCl-naloxone HCl  1 Film Sublingual TID     cloNIDine  0.1 mg Oral At Bedtime     nicotine  1 patch Transdermal Daily     nicotine   Transdermal Q8H     sodium chloride (PF)  3 mL Intracatheter Q8H     vancomycin (VANCOCIN) IV  1,250 mg Intravenous Q8H       Data    All microbiology laboratory data reviewed.  Recent Labs   Lab Test 05/14/20  0603 05/07/20  0610 05/06/20  0629   WBC 5.1 5.5 4.2   HGB 12.2* 11.0* 11.4*   HCT 39.4* 33.8* 36.3*   MCV 57* 53* 55*    248 233     Recent Labs   Lab Test 05/15/20  0706 05/14/20  0603 05/07/20  0610   CR 0.72 0.62* 0.64*     No lab results found.  Recent Labs   Lab Test 05/06/20  2335 05/06/20  1129 05/05/20  1106 05/05/20  1043 05/04/20  0138 05/04/20  0046   CULT No growth No growth No growth No growth No growth Cultured on the 1st day of incubation:  Staphylococcus aureus  *  Critical Value/Significant Value, preliminary result only, called to and read back by  Dr Mims, On call hospitalist, 5/5/20 @ 0233 TF    (Note)  POSITIVE for STAPHYLOCOCCUS AUREUS and NEGATIVE for the mecA gene  (not MRSA) by Verigene multiplex nucleic acid test. The mecA gene was  not detected. Final identification and antimicrobial susceptibility  testing will be verified by standard methods.    Specimen tested with Verigene multiplex, gram-positive blood culture  nucleic acid test for the following targets: Staph aureus, Staph  epidermidis, Staph lugdunensis, other Staph species, Enterococcus  faecalis, Enterococcus faecium, Streptococcus species, S. agalactiae,  S. anginosus grp., S. pneumoniae, S. pyogenes, Listeria sp., mecA  (methicillin resistance) and Rico/B (vancomycin resistance).    Critical Value/Significant Value called to and read back by DR YURI MIMS ON CALL HOSPITALIST [facility] 0603 05.05.20 CF         Attestation:  Total time on the floor involved in the patient's care: 35 minutes. Total time spent in counseling/care coordination: >50%

## 2020-05-15 NOTE — PROGRESS NOTES
[facility]    Internal Medicine Hospitalist Progress Note  05/15/2020  I evaluated patient on the above date.      Assessment & Plan   Jcarlos Maravilla is a 24 year old male with a past medical history including IV heroin, who was initially hospitalization at [facility] 5/3-5/4 with febrile illness associated with IV heroin usage. However, after discharge, blood cultures became positive for MSSA and pt was subsequently directly admitted to [facility] 5/5/2020 for further workup including SKYE.    MSSA bacteremia, suspect related to IV drug use.  * Initially presented to [facility] evening 5/3/2020 following encouragement from police officers to seek evaluation following use of heroin with his friend, who overdosed. On initial evaluation there, was febrile, with signs of sepsis with elevated lactic acid, procalcitonin 1.22. Subsequently admitted and treated with broad antibiotics. COVID-19 PCR 5/4 negative. TTE 5/4 negative for vegetation. Sepsis resolved and pt was discharged 5/4 on Augmentin (for thrombophlebitis). However, on 5/5, blood cultures from 5/3 came back positive for MSSA and pt sent to [facility] ED for repeat BC's and subsequently directly admitted to [facility] 5/5.   * Started on cefazolin on admit 5/5. SKYE 5/6 negative for vegetations. ID consulted. BC's from 5/5, 5/6 and 05/07 NGTD.  - Continue cefazolin (started 5/5) - plan 4 weeks of IV antibiotics (stop 6/2/2020); will likely remain hospitalized here to complete the course unless accepting TCU can be found (unlikely); day 9/28 today  - Midline placed on 05/08 but kinked later on so removed  - currently had an PIV  - Monitor cultures.  Negative so far  - Appreciate ID help    Superficial occlusive thrombophlebitis of right median cubital vein.  * Diagnosed by US at [facility] 5/4. Was started on Eliquis at [facility] 5/4 and discharged on Augmentin. Did not fill scripts yet after discharge.  * Thrombophlebitis improved despite not  continuing Eliquis after recent discharge. Improved after admission off anticoagulation.  - Continue off anticoagulation.  - Monitor site of thrombophlebitis, if worsens, could consider restarting AC.  - On antibiotics as above.    IVDU - heroin.  * Patient reported using IV heroin use daily. He started using heroin 2 years ago and prefers IV injections. He admits to using around 1-2 gm daily. He had an accidental overdose 3/2020 requiring 12 mg of narcan. Patients father was a known drug user and  of an overdose. He reportedly was working on getting enrolled in [employer] [employer] in [place] but had been waiting due to lack of insurance.   * On admit, patient expressed interest in Suboxone. Seen by Psychiatry consulted  and started on Suboxone.  - Psych reconsulted on - currently on Subaxone 1 film TID  - Clonidine changed from prn to 0.1 mg po at bedtime   - plan continue at discharge, consult Psychiatry to order for discharge, appreciate help..  - Continue PRN loperamide.  - Follow-up CD treatment after discharge.    Anemia, suspect chronic disease.  Pt anemic 10-11 range noted during recent hospitalization. No obvious signs of blood loss. Hgb 11.0 on admit . 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  Recent Labs   Lab 20  0603   HGB 12.2*   - Monitor CBC.  - Consider prbc transfusion if hgb </= 7.0 or if significant bleeding with hemodynamic instability or if symptomatic.    Nicotine dependence  Patient reported smoking 1.5 PPD. Started on nicotine patch.  - Continue nicotine 21 mg patch.    Skin rash  - noted to have a rash on his forearms bilateral and left sided chest, the rash is itchy  - contact dermatitis vs drug-induced?  - cortisone cream TID prn, Benadryl po prn  - the rash on this forearms improved, still present over left side of the chest       Diet: Regular Diet Adult    Prophylaxis: PCD's, ambulation.   Cook Catheter: not  present  Code Status: Full Code      Disposition Plan   Expected discharge: after 6/2/2020, recommended to prior living arrangement after completion of IV antibiotics (home not an option given IV drug use; TCU's unlikely to accept pt given IV drug use).    Entered: Gloria Farrell MD 05/15/2020, 11:54 AM     Communication.  - discussed with RN    Interval History    P patient is resting comfortably, denies any new complaints, he had maculopapular rash on his chest wall as well as in the right lower quadrant on the abdomen which is itchy but improving in color    -Data reviewed today: I reviewed all new labs and imaging over the last 24 hours. I personally reviewed no images or EKG's today.    Physical Exam   Heart Rate: 65, Blood pressure 111/61, pulse 65, temperature 97.9  F (36.6  C), temperature source Oral, resp. rate 16, weight 98 kg (216 lb), SpO2 98 %.  Vitals:    05/12/20 0529 05/13/20 0635 05/14/20 0500   Weight: 98 kg (216 lb) 98.1 kg (216 lb 4.8 oz) 98 kg (216 lb)     Vital Signs with Ranges  Temp:  [97.9  F (36.6  C)-98.2  F (36.8  C)] 97.9  F (36.6  C)  Pulse:  [65-78] 65  Heart Rate:  [65-79] 65  Resp:  [16] 16  BP: (110-120)/(56-68) 111/61  SpO2:  [95 %-98 %] 98 %  Patient Vitals for the past 24 hrs:   BP Temp Temp src Pulse Heart Rate Resp SpO2   05/15/20 0848 111/61 97.9  F (36.6  C) Oral 65 65 16 98 %   05/14/20 2340 114/68 98.2  F (36.8  C) Oral 73 70 16 96 %   05/14/20 1921 120/56 98  F (36.7  C) Oral 74 79 16 95 %   05/14/20 1531 110/59 98.1  F (36.7  C) Oral 78 78 16 95 %     I/O's Last 24 hours  I/O last 3 completed shifts:  In: 780 [P.O.:780]  Out: -     Constitutional: Awake, alert, pleasant.  Respiratory: Bilateral air entry, no wheezing, no crackles or wheezes.  Cardiovascular: S1S2, RRR, no m/r/g.  GI: abd s0ft, nonT, nonD, BS present  Skin/Integumen: R AC region with improved swelling/firmness and bruising; macular rash over his forearms bilateral- improved and left sided chest  wall.  Extremities- no leg swellings        Data   Recent Labs   Lab 05/15/20  0706 05/14/20  0603   WBC  --  5.1   HGB  --  12.2*   MCV  --  57*   PLT  --  344   NA  --  138   POTASSIUM  --  4.3   CHLORIDE  --  104   CO2  --  29   BUN  --  10   CR 0.72 0.62*   ANIONGAP  --  5   MAGALI  --  9.2   GLC  --  93     Recent Labs   Lab Test 05/14/20  0603 05/07/20  0610 05/06/20  0629 05/05/20  1043 05/04/20  0551   GLC 93 84 95 95 86         No results found for this or any previous visit (from the past 24 hour(s)).    Medications   All medications were reviewed.      buprenorphine HCl-naloxone HCl  1 Film Sublingual TID     cloNIDine  0.1 mg Oral At Bedtime     nicotine  1 patch Transdermal Daily     nicotine   Transdermal Q8H     sodium chloride (PF)  3 mL Intracatheter Q8H     vancomycin (VANCOCIN) IV  1,250 mg Intravenous Q8H

## 2020-05-15 NOTE — PLAN OF CARE
Pt is alert and oriented, VSS on RA, afebrile, independent in room, hydrocortisone cream applied to the rashes, PO benadryl and melatonin administered at HS. IV SL. Progressing well per plan of care.

## 2020-05-15 NOTE — PLAN OF CARE
Problem: Adult Inpatient Plan of Care  Goal: Optimal Comfort and Wellbeing  5/15/2020 1358 by Sherley Chirinos, RN  Outcome: Improving  5/15/2020 0613 by Deshawn Rodriguez RN  Outcome: No Change     Problem: Adult Inpatient Plan of Care  Goal: Readiness for Transition of Care  5/15/2020 1358 by Sherley Chirinos RN  Outcome: Improving  5/15/2020 0613 by Deshawn Rodriguez RN  Outcome: No Change  Plan to stay in patient at this time until antibiotics are completed.  Up independent. IV vanco is being given. Suspected drug rash improving slightly per patient. Po benadryl given x1 for itching.

## 2020-05-16 LAB
CREAT SERPL-MCNC: 1.04 MG/DL (ref 0.66–1.25)
GFR SERPL CREATININE-BSD FRML MDRD: >90 ML/MIN/{1.73_M2}

## 2020-05-16 PROCEDURE — 99232 SBSQ HOSP IP/OBS MODERATE 35: CPT | Performed by: INTERNAL MEDICINE

## 2020-05-16 PROCEDURE — 25000132 ZZH RX MED GY IP 250 OP 250 PS 637: Performed by: INTERNAL MEDICINE

## 2020-05-16 PROCEDURE — 25000132 ZZH RX MED GY IP 250 OP 250 PS 637: Performed by: NURSE PRACTITIONER

## 2020-05-16 PROCEDURE — 25000128 H RX IP 250 OP 636: Performed by: INTERNAL MEDICINE

## 2020-05-16 PROCEDURE — 82565 ASSAY OF CREATININE: CPT | Performed by: INTERNAL MEDICINE

## 2020-05-16 PROCEDURE — 25800030 ZZH RX IP 258 OP 636: Performed by: INTERNAL MEDICINE

## 2020-05-16 PROCEDURE — 12000000 ZZH R&B MED SURG/OB

## 2020-05-16 PROCEDURE — 25000132 ZZH RX MED GY IP 250 OP 250 PS 637: Performed by: PSYCHIATRY & NEUROLOGY

## 2020-05-16 PROCEDURE — 36415 COLL VENOUS BLD VENIPUNCTURE: CPT | Performed by: INTERNAL MEDICINE

## 2020-05-16 RX ADMIN — Medication 1 MG: at 00:24

## 2020-05-16 RX ADMIN — BUPRENORPHINE HYDROCHLORIDE, NALOXONE HYDROCHLORIDE 1 FILM: 4; 1 FILM, SOLUBLE BUCCAL; SUBLINGUAL at 12:05

## 2020-05-16 RX ADMIN — VANCOMYCIN HYDROCHLORIDE 1250 MG: 5 INJECTION, POWDER, LYOPHILIZED, FOR SOLUTION INTRAVENOUS at 00:24

## 2020-05-16 RX ADMIN — Medication 1 MG: at 23:32

## 2020-05-16 RX ADMIN — CLONIDINE HYDROCHLORIDE 0.1 MG: 0.1 TABLET ORAL at 21:04

## 2020-05-16 RX ADMIN — NICOTINE 1 PATCH: 21 PATCH, EXTENDED RELEASE TRANSDERMAL at 12:05

## 2020-05-16 RX ADMIN — VANCOMYCIN HYDROCHLORIDE 1500 MG: 5 INJECTION, POWDER, LYOPHILIZED, FOR SOLUTION INTRAVENOUS at 21:05

## 2020-05-16 RX ADMIN — DIPHENHYDRAMINE HYDROCHLORIDE 25 MG: 25 CAPSULE ORAL at 23:32

## 2020-05-16 RX ADMIN — VANCOMYCIN HYDROCHLORIDE 1500 MG: 5 INJECTION, POWDER, LYOPHILIZED, FOR SOLUTION INTRAVENOUS at 14:24

## 2020-05-16 RX ADMIN — BUPRENORPHINE HYDROCHLORIDE, NALOXONE HYDROCHLORIDE 1 FILM: 4; 1 FILM, SOLUBLE BUCCAL; SUBLINGUAL at 16:06

## 2020-05-16 RX ADMIN — BUPRENORPHINE HYDROCHLORIDE, NALOXONE HYDROCHLORIDE 1 FILM: 4; 1 FILM, SOLUBLE BUCCAL; SUBLINGUAL at 21:04

## 2020-05-16 RX ADMIN — DIPHENHYDRAMINE HYDROCHLORIDE 25 MG: 25 CAPSULE ORAL at 00:24

## 2020-05-16 RX ADMIN — VANCOMYCIN HYDROCHLORIDE 1500 MG: 5 INJECTION, POWDER, LYOPHILIZED, FOR SOLUTION INTRAVENOUS at 06:27

## 2020-05-16 ASSESSMENT — ACTIVITIES OF DAILY LIVING (ADL)
ADLS_ACUITY_SCORE: 10

## 2020-05-16 NOTE — PROGRESS NOTES
[facility]    Internal Medicine Hospitalist Progress Note  05/16/2020  I evaluated patient on the above date.      Assessment & Plan   Jcarlos Maravilla is a 24 year old male with a past medical history including IV heroin, who was initially hospitalization at [facility] 5/3-5/4 with febrile illness associated with IV heroin usage. However, after discharge, blood cultures became positive for MSSA and pt was subsequently directly admitted to [facility] 5/5/2020 for further workup including SKYE.    MSSA bacteremia, suspect related to IV drug use.  * Initially presented to [facility] evening 5/3/2020 following encouragement from police officers to seek evaluation following use of heroin with his friend, who overdosed. On initial evaluation there, was febrile, with signs of sepsis with elevated lactic acid, procalcitonin 1.22. Subsequently admitted and treated with broad antibiotics. COVID-19 PCR 5/4 negative. TTE 5/4 negative for vegetation. Sepsis resolved and pt was discharged 5/4 on Augmentin (for thrombophlebitis). However, on 5/5, blood cultures from 5/3 came back positive for MSSA and pt sent to [facility] ED for repeat BC's and subsequently directly admitted to [facility] 5/5.   * Started on cefazolin on admit 5/5. SKYE 5/6 negative for vegetations. ID consulted. BC's from 5/5, 5/6 and 05/07 NGTD.  - Continue cefazolin (started 5/5) - plan 4 weeks of IV antibiotics (stop 6/2/2020); will likely remain hospitalized here to complete the course unless accepting TCU can be found (unlikely); antibiotics changed to vancomycin due to rash on the flank 11/28-day of antibiotic  - Midline placed on 05/08 but kinked later on so removed  - currently had an PIV  - Monitor cultures.  Negative so far  - Appreciate ID help    Superficial occlusive thrombophlebitis of right median cubital vein.  * Diagnosed by US at [facility] 5/4. Was started on Eliquis at [facility] 5/4 and discharged on Augmentin. Did not fill scripts  yet after discharge.  * Thrombophlebitis improved despite not continuing Eliquis after recent discharge. Improved after admission off anticoagulation.  - Continue off anticoagulation.  - Monitor site of thrombophlebitis, if worsens, could consider restarting AC.  - On antibiotics as above.    IVDU - heroin.  * Patient reported using IV heroin use daily. He started using heroin 2 years ago and prefers IV injections. He admits to using around 1-2 gm daily. He had an accidental overdose 3/2020 requiring 12 mg of narcan. Patients father was a known drug user and  of an overdose. He reportedly was working on getting enrolled in [employer] [employer] in [place] but had been waiting due to lack of insurance.   * On admit, patient expressed interest in Suboxone. Seen by Psychiatry consulted  and started on Suboxone.  - Psych reconsulted on - currently on Subaxone 1 film TID  - Clonidine changed from prn to 0.1 mg po at bedtime   - plan continue at discharge, consult Psychiatry to order for discharge, appreciate help..  - Continue PRN loperamide.  - Follow-up CD treatment after discharge.    Anemia, suspect chronic disease.  Pt anemic 10-11 range noted during recent hospitalization. No obvious signs of blood loss. Hgb 11.0 on admit . 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  Recent Labs   Lab 20  0603   HGB 12.2*   - Monitor CBC.  - Consider prbc transfusion if hgb </= 7.0 or if significant bleeding with hemodynamic instability or if symptomatic.    Nicotine dependence  Patient reported smoking 1.5 PPD. Started on nicotine patch.  - Continue nicotine 21 mg patch.    Skin rash  - noted to have a rash on his forearms bilateral and left sided chest, the rash is itchy  - contact dermatitis vs drug-induced?  - cortisone cream TID prn, Benadryl po prn  - the rash on this forearms improved, still present over left side of the chest       Diet: Regular Diet Adult     Prophylaxis: PCD's, ambulation.   Cook Catheter: not present  Code Status: Full Code      Disposition Plan   Expected discharge: after 6/2/2020, recommended to prior living arrangement after completion of IV antibiotics (home not an option given IV drug use; TCU's unlikely to accept pt given IV drug use).    Entered: Gloria Farrell MD 05/16/2020, 11:31 AM     Communication.  - discussed with RN    Interval History    Patient resting no new complaints, rash is getting better as per him less itching.  -Data reviewed today: I reviewed all new labs and imaging over the last 24 hours. I personally reviewed no images or EKG's today.    Physical Exam   Heart Rate: 73, Blood pressure 106/64, pulse 62, temperature 97.9  F (36.6  C), temperature source Oral, resp. rate 16, weight 98 kg (216 lb), SpO2 97 %.  Vitals:    05/12/20 0529 05/13/20 0635 05/14/20 0500   Weight: 98 kg (216 lb) 98.1 kg (216 lb 4.8 oz) 98 kg (216 lb)     Vital Signs with Ranges  Temp:  [97.9  F (36.6  C)-98.4  F (36.9  C)] 97.9  F (36.6  C)  Pulse:  [60-86] 62  Heart Rate:  [70-94] 73  Resp:  [16] 16  BP: (106-122)/(64-72) 106/64  SpO2:  [96 %-98 %] 97 %  Patient Vitals for the past 24 hrs:   BP Temp Temp src Pulse Heart Rate Resp SpO2   05/16/20 0730 106/64 97.9  F (36.6  C) Oral -- 73 16 97 %   05/16/20 0400 110/70 98.4  F (36.9  C) Oral 62 70 16 96 %   05/16/20 0000 112/67 98.1  F (36.7  C) Oral 60 -- 16 98 %   05/15/20 2328 -- -- -- -- -- 16 --   05/15/20 2243 122/70 -- -- -- -- -- --   05/15/20 2024 109/67 98.2  F (36.8  C) Oral 86 85 16 96 %   05/15/20 1605 116/72 98.4  F (36.9  C) Oral -- 94 16 96 %     I/O's Last 24 hours  I/O last 3 completed shifts:  In: 600 [P.O.:600]  Out: -     Constitutional: Awake, alert, pleasant.  Respiratory: Bilateral air entry, no wheezing, no crackles or wheezes.  Cardiovascular: S1S2, RRR, no m/r/g.  GI: abd s0ft, nonT, nonD, BS present  Skin/Integumen: R AC region with improved swelling/firmness and bruising;  macular rash over his forearms bilateral- improved and left sided chest wall.  Extremities- no leg swellings        Data   Recent Labs   Lab 05/16/20  0602 05/15/20  0706 05/14/20  0603   WBC  --   --  5.1   HGB  --   --  12.2*   MCV  --   --  57*   PLT  --   --  344   NA  --   --  138   POTASSIUM  --   --  4.3   CHLORIDE  --   --  104   CO2  --   --  29   BUN  --   --  10   CR 1.04 0.72 0.62*   ANIONGAP  --   --  5   MAGALI  --   --  9.2   GLC  --   --  93     Recent Labs   Lab Test 05/14/20  0603 05/07/20  0610 05/06/20  0629 05/05/20  1043 05/04/20  0551   GLC 93 84 95 95 86         No results found for this or any previous visit (from the past 24 hour(s)).    Medications   All medications were reviewed.      buprenorphine HCl-naloxone HCl  1 Film Sublingual TID     cloNIDine  0.1 mg Oral At Bedtime     nicotine  1 patch Transdermal Daily     nicotine   Transdermal Q8H     sodium chloride (PF)  3 mL Intracatheter Q8H     vancomycin (VANCOCIN) IV  1,500 mg Intravenous Q8H

## 2020-05-16 NOTE — PHARMACY-VANCOMYCIN DOSING SERVICE
Pharmacy Vancomycin Note  Date of Service May 16, 2020  Patient's  1995   24 year old, male    Indication: Bacteremia  Goal Trough Level: 15-20 mg/L  Day of Therapy: 3  Current Vancomycin regimen:  1250 mg IV q8h    Current estimated CrCl = Estimated Creatinine Clearance: 195.3 mL/min (based on SCr of 0.72 mg/dL).    Creatinine for last 3 days  2020:  6:03 AM Creatinine 0.62 mg/dL  5/15/2020:  7:06 AM Creatinine 0.72 mg/dL    Recent Vancomycin Levels (past 3 days)  5/15/2020: 10:40 PM Vancomycin Level 11.4 mg/L    Vancomycin IV Administrations (past 72 hours)                   vancomycin 1250 mg in 0.9% NaCl 250 mL intermittent infusion 1,250 mg (mg) 1,250 mg Given 20 0024     1,250 mg Given 05/15/20 1638     1,250 mg Given  0812     1,250 mg Given  0000     1,250 mg Given 20 1712                Nephrotoxins and other renal medications (From now, onward)    Start     Dose/Rate Route Frequency Ordered Stop    20 0600  vancomycin 1500 mg in 0.9% NaCl 250 ml intermittent infusion 1,500 mg      1,500 mg  over 90 Minutes Intravenous EVERY 8 HOURS 20 0030               Contrast Orders - past 72 hours (72h ago, onward)    None          Interpretation of levels and current regimen:  Trough level is  Subtherapeutic    Has serum creatinine changed > 50% in last 72 hours: No    Urine output:  unable to determine    Renal Function: Stable    Plan:  1.  Increase Dose to 1500mg q8h  2.  Pharmacy will check trough levels as appropriate in 1-3 Days.    3. Serum creatinine levels will be ordered daily for the first week of therapy and at least twice weekly for subsequent weeks.      Kendall [facility]        .

## 2020-05-16 NOTE — PLAN OF CARE
Denies pain. VSS, afebrile, on room air. Up independently. IV SL. Vanco q8h. Nicotine patch on right shoulder. Rash improving per patient. Benadryl PRN

## 2020-05-16 NOTE — PLAN OF CARE
Patient denies pain, independent in room, rash present bilateral upper arms/armpit areas/sides of trunk/ right stomach - patient denies itchiness or change in rash, and patient is progressing toward plan of care.

## 2020-05-17 LAB
ANION GAP SERPL CALCULATED.3IONS-SCNC: 5 MMOL/L (ref 3–14)
BUN SERPL-MCNC: 8 MG/DL (ref 7–30)
CALCIUM SERPL-MCNC: 9.4 MG/DL (ref 8.5–10.1)
CHLORIDE SERPL-SCNC: 104 MMOL/L (ref 94–109)
CO2 SERPL-SCNC: 27 MMOL/L (ref 20–32)
CREAT SERPL-MCNC: 0.77 MG/DL (ref 0.66–1.25)
ERYTHROCYTE [DISTWIDTH] IN BLOOD BY AUTOMATED COUNT: 16.6 % (ref 10–15)
GFR SERPL CREATININE-BSD FRML MDRD: >90 ML/MIN/{1.73_M2}
GLUCOSE SERPL-MCNC: 86 MG/DL (ref 70–99)
HCT VFR BLD AUTO: 40.4 % (ref 40–53)
HGB BLD-MCNC: 12.4 G/DL (ref 13.3–17.7)
MCH RBC QN AUTO: 17.5 PG (ref 26.5–33)
MCHC RBC AUTO-ENTMCNC: 30.7 G/DL (ref 31.5–36.5)
MCV RBC AUTO: 57 FL (ref 78–100)
PLATELET # BLD AUTO: 306 10E9/L (ref 150–450)
POTASSIUM SERPL-SCNC: 4.4 MMOL/L (ref 3.4–5.3)
RBC # BLD AUTO: 7.09 10E12/L (ref 4.4–5.9)
SODIUM SERPL-SCNC: 136 MMOL/L (ref 133–144)
WBC # BLD AUTO: 6.2 10E9/L (ref 4–11)

## 2020-05-17 PROCEDURE — 40000141 ZZH STATISTIC PERIPHERAL IV START W/O US GUIDANCE

## 2020-05-17 PROCEDURE — 36415 COLL VENOUS BLD VENIPUNCTURE: CPT | Performed by: INTERNAL MEDICINE

## 2020-05-17 PROCEDURE — 25000132 ZZH RX MED GY IP 250 OP 250 PS 637: Performed by: INTERNAL MEDICINE

## 2020-05-17 PROCEDURE — 25000128 H RX IP 250 OP 636: Performed by: INTERNAL MEDICINE

## 2020-05-17 PROCEDURE — 25000132 ZZH RX MED GY IP 250 OP 250 PS 637: Performed by: PSYCHIATRY & NEUROLOGY

## 2020-05-17 PROCEDURE — 12000000 ZZH R&B MED SURG/OB

## 2020-05-17 PROCEDURE — 85027 COMPLETE CBC AUTOMATED: CPT | Performed by: INTERNAL MEDICINE

## 2020-05-17 PROCEDURE — 99232 SBSQ HOSP IP/OBS MODERATE 35: CPT | Performed by: INTERNAL MEDICINE

## 2020-05-17 PROCEDURE — 80048 BASIC METABOLIC PNL TOTAL CA: CPT | Performed by: INTERNAL MEDICINE

## 2020-05-17 PROCEDURE — 25800030 ZZH RX IP 258 OP 636: Performed by: INTERNAL MEDICINE

## 2020-05-17 RX ADMIN — BUPRENORPHINE HYDROCHLORIDE, NALOXONE HYDROCHLORIDE 1 FILM: 4; 1 FILM, SOLUBLE BUCCAL; SUBLINGUAL at 16:16

## 2020-05-17 RX ADMIN — VANCOMYCIN HYDROCHLORIDE 1500 MG: 5 INJECTION, POWDER, LYOPHILIZED, FOR SOLUTION INTRAVENOUS at 08:36

## 2020-05-17 RX ADMIN — NICOTINE 1 PATCH: 21 PATCH, EXTENDED RELEASE TRANSDERMAL at 12:00

## 2020-05-17 RX ADMIN — CLONIDINE HYDROCHLORIDE 0.1 MG: 0.1 TABLET ORAL at 22:20

## 2020-05-17 RX ADMIN — BUPRENORPHINE HYDROCHLORIDE, NALOXONE HYDROCHLORIDE 1 FILM: 4; 1 FILM, SOLUBLE BUCCAL; SUBLINGUAL at 22:20

## 2020-05-17 RX ADMIN — VANCOMYCIN HYDROCHLORIDE 1500 MG: 5 INJECTION, POWDER, LYOPHILIZED, FOR SOLUTION INTRAVENOUS at 16:16

## 2020-05-17 RX ADMIN — DIPHENHYDRAMINE HYDROCHLORIDE 25 MG: 25 CAPSULE ORAL at 16:18

## 2020-05-17 RX ADMIN — BUPRENORPHINE HYDROCHLORIDE, NALOXONE HYDROCHLORIDE 1 FILM: 4; 1 FILM, SOLUBLE BUCCAL; SUBLINGUAL at 12:00

## 2020-05-17 ASSESSMENT — ACTIVITIES OF DAILY LIVING (ADL)
ADLS_ACUITY_SCORE: 10

## 2020-05-17 NOTE — PLAN OF CARE
VSS, on room air, afebrile. Up independently. Denies pain. Rash on anterior abdomen and upper extremities improving

## 2020-05-17 NOTE — PLAN OF CARE
Pt A/Ox4, VSS on RA, denies pain, CMS intact, independent in room, antibiotics given, will continue to  monitor

## 2020-05-17 NOTE — PLAN OF CARE
Up independently. Calm and cooperative. VSS, on room air. Denies pain. Benadryl for rash on anterior torso and bilateral UEs  Vanco q8h

## 2020-05-17 NOTE — PLAN OF CARE
Patient denies pain, up independently in room, rash slightly better than yesterday, and patient is progressing toward plan of care.

## 2020-05-17 NOTE — PROGRESS NOTES
[facility]    Internal Medicine Hospitalist Progress Note  05/17/2020  I evaluated patient on the above date.      Assessment & Plan   Jcarlos Maravilla is a 24 year old male with a past medical history including IV heroin, who was initially hospitalization at [facility] 5/3-5/4 with febrile illness associated with IV heroin usage. However, after discharge, blood cultures became positive for MSSA and pt was subsequently directly admitted to [facility] 5/5/2020 for further workup including SKYE.    MSSA bacteremia, suspect related to IV drug use.  * Initially presented to [facility] evening 5/3/2020 following encouragement from police officers to seek evaluation following use of heroin with his friend, who overdosed. On initial evaluation there, was febrile, with signs of sepsis with elevated lactic acid, procalcitonin 1.22. Subsequently admitted and treated with broad antibiotics. COVID-19 PCR 5/4 negative. TTE 5/4 negative for vegetation. Sepsis resolved and pt was discharged 5/4 on Augmentin (for thrombophlebitis). However, on 5/5, blood cultures from 5/3 came back positive for MSSA and pt sent to [facility] ED for repeat BC's and subsequently directly admitted to [facility] 5/5.   * Started on cefazolin on admit 5/5. SKYE 5/6 negative for vegetations. ID consulted. BC's from 5/5, 5/6 and 05/07 NGTD.  - Continue cefazolin (started 5/5) - plan 4 weeks of IV antibiotics (stop 6/2/2020); will likely remain hospitalized here to complete the course unless accepting TCU can be found (unlikely);   -Antibiotics changed to vancomycin due to rash on the flank 12/28-day of antibiotic  - Midline placed on 05/08 but kinked later on so removed  - currently had an PIV  - Monitor cultures.  Negative so far  - Appreciate ID help    Superficial occlusive thrombophlebitis of right median cubital vein.  * Diagnosed by US at [facility] 5/4. Was started on Eliquis at [facility] 5/4 and discharged on Augmentin. Did not fill  scripts yet after discharge.  * Thrombophlebitis improved despite not continuing Eliquis after recent discharge. Improved after admission off anticoagulation.  - Continue off anticoagulation.  - Monitor site of thrombophlebitis, if worsens, could consider restarting AC.  - On antibiotics as above.    IVDU - heroin.  * Patient reported using IV heroin use daily. He started using heroin 2 years ago and prefers IV injections. He admits to using around 1-2 gm daily. He had an accidental overdose 3/2020 requiring 12 mg of narcan. Patients father was a known drug user and  of an overdose. He reportedly was working on getting enrolled in [employer] in [place] but had been waiting due to lack of insurance.   * On admit, patient expressed interest in Suboxone. Seen by Psychiatry consulted  and started on Suboxone.  - Psych reconsulted on - currently on Subaxone 1 film TID  - Clonidine changed from prn to 0.1 mg po at bedtime   - plan continue at discharge, consult Psychiatry to order for discharge, appreciate help..  - Continue PRN loperamide.  - Follow-up CD treatment after discharge.    Anemia, suspect chronic disease.  Pt anemic 10-11 range noted during recent hospitalization. No obvious signs of blood loss. Hgb 11.0 on admit . 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  Recent Labs   Lab 20  0616 20  0603   HGB 12.4* 12.2*   - Monitor CBC.  - Consider prbc transfusion if hgb </= 7.0 or if significant bleeding with hemodynamic instability or if symptomatic.  -His peripheral smear is suggesting a possible hemoglobinopathy, will order electrophoresis    Nicotine dependence  Patient reported smoking 1.5 PPD. Started on nicotine patch.  - Continue nicotine 21 mg patch.    Skin rash  - noted to have a rash on his forearms bilateral and left sided chest, the rash is itchy  - contact dermatitis vs drug-induced?  - cortisone cream TID prn,  Benadryl po prn  - the rash on this forearms improved, still present over left side of the chest       Diet: Regular Diet Adult    Prophylaxis: PCD's, ambulation.   Cook Catheter: not present  Code Status: Full Code      Disposition Plan   Expected discharge: after 6/2/2020, recommended to prior living arrangement after completion of IV antibiotics (home not an option given IV drug use; TCU's unlikely to accept pt given IV drug use).    Entered: Gloria Farrell MD 05/17/2020, 11:59 AM     Communication.  - discussed with RN    Interval History    Patient is very sleepy, he avoids interaction, no new issues noted  -Data reviewed today: I reviewed all new labs and imaging over the last 24 hours. I personally reviewed no images or EKG's today.    Physical Exam   Heart Rate: 72, Blood pressure 109/64, pulse 70, temperature 98.2  F (36.8  C), temperature source Oral, resp. rate 15, weight 100.2 kg (220 lb 12.8 oz), SpO2 98 %.  Vitals:    05/13/20 0635 05/14/20 0500 05/17/20 0626   Weight: 98.1 kg (216 lb 4.8 oz) 98 kg (216 lb) 100.2 kg (220 lb 12.8 oz)     Vital Signs with Ranges  Temp:  [98  F (36.7  C)-98.7  F (37.1  C)] 98.2  F (36.8  C)  Pulse:  [70-93] 70  Heart Rate:  [72-95] 72  Resp:  [15-16] 15  BP: (109-116)/(63-79) 109/64  SpO2:  [95 %-98 %] 98 %  Patient Vitals for the past 24 hrs:   BP Temp Temp src Pulse Heart Rate Resp SpO2 Weight   05/17/20 0732 109/64 98.2  F (36.8  C) Oral 70 72 15 98 % --   05/17/20 0626 -- -- -- -- -- -- -- 100.2 kg (220 lb 12.8 oz)   05/17/20 0320 113/79 98.7  F (37.1  C) Oral -- 95 15 97 % --   05/17/20 0000 116/78 98  F (36.7  C) Oral -- 94 15 97 % --   05/16/20 1946 115/63 98.1  F (36.7  C) Oral 93 93 16 98 % --   05/16/20 1603 111/64 98.1  F (36.7  C) Oral 84 83 16 95 % --     I/O's Last 24 hours  No intake/output data recorded.    Constitutional: Awake, alert, pleasant.  Respiratory: Bilateral air entry, no wheezing, no crackles or wheezes.  Cardiovascular: S1S2, RRR, no  m/r/g.  GI: abd s0ft, nonT, nonD, BS present  Skin/Integumen: R AC region with improved swelling/firmness and bruising; macular rash over his forearms bilateral- improved and left sided chest wall.  Extremities- no leg swellings        Data   Recent Labs   Lab 05/17/20  0616 05/16/20  0602 05/15/20  0706 05/14/20  0603   WBC 6.2  --   --  5.1   HGB 12.4*  --   --  12.2*   MCV 57*  --   --  57*     --   --  344     --   --  138   POTASSIUM 4.4  --   --  4.3   CHLORIDE 104  --   --  104   CO2 27  --   --  29   BUN 8  --   --  10   CR 0.77 1.04 0.72 0.62*   ANIONGAP 5  --   --  5   MAGALI 9.4  --   --  9.2   GLC 86  --   --  93     Recent Labs   Lab Test 05/17/20  0616 05/14/20  0603 05/07/20  0610 05/06/20  0629 05/05/20  1043   GLC 86 93 84 95 95         No results found for this or any previous visit (from the past 24 hour(s)).    Medications   All medications were reviewed.      buprenorphine HCl-naloxone HCl  1 Film Sublingual TID     cloNIDine  0.1 mg Oral At Bedtime     nicotine  1 patch Transdermal Daily     nicotine   Transdermal Q8H     sodium chloride (PF)  3 mL Intracatheter Q8H     vancomycin (VANCOCIN) IV  1,500 mg Intravenous Q8H

## 2020-05-18 LAB
CREAT SERPL-MCNC: 0.61 MG/DL (ref 0.66–1.25)
GFR SERPL CREATININE-BSD FRML MDRD: >90 ML/MIN/{1.73_M2}

## 2020-05-18 PROCEDURE — 83021 HEMOGLOBIN CHROMOTOGRAPHY: CPT | Performed by: INTERNAL MEDICINE

## 2020-05-18 PROCEDURE — 25800030 ZZH RX IP 258 OP 636: Performed by: INTERNAL MEDICINE

## 2020-05-18 PROCEDURE — 25000132 ZZH RX MED GY IP 250 OP 250 PS 637: Performed by: INTERNAL MEDICINE

## 2020-05-18 PROCEDURE — 36415 COLL VENOUS BLD VENIPUNCTURE: CPT | Performed by: INTERNAL MEDICINE

## 2020-05-18 PROCEDURE — 25000132 ZZH RX MED GY IP 250 OP 250 PS 637: Performed by: NURSE PRACTITIONER

## 2020-05-18 PROCEDURE — 99232 SBSQ HOSP IP/OBS MODERATE 35: CPT | Performed by: INTERNAL MEDICINE

## 2020-05-18 PROCEDURE — 25000128 H RX IP 250 OP 636: Performed by: INTERNAL MEDICINE

## 2020-05-18 PROCEDURE — 12000000 ZZH R&B MED SURG/OB

## 2020-05-18 PROCEDURE — 25000132 ZZH RX MED GY IP 250 OP 250 PS 637: Performed by: PSYCHIATRY & NEUROLOGY

## 2020-05-18 PROCEDURE — 82565 ASSAY OF CREATININE: CPT | Performed by: INTERNAL MEDICINE

## 2020-05-18 RX ADMIN — VANCOMYCIN HYDROCHLORIDE 1500 MG: 5 INJECTION, POWDER, LYOPHILIZED, FOR SOLUTION INTRAVENOUS at 08:03

## 2020-05-18 RX ADMIN — BUPRENORPHINE HYDROCHLORIDE, NALOXONE HYDROCHLORIDE 1 FILM: 4; 1 FILM, SOLUBLE BUCCAL; SUBLINGUAL at 08:04

## 2020-05-18 RX ADMIN — Medication 1 MG: at 00:54

## 2020-05-18 RX ADMIN — BUPRENORPHINE HYDROCHLORIDE, NALOXONE HYDROCHLORIDE 1 FILM: 4; 1 FILM, SOLUBLE BUCCAL; SUBLINGUAL at 22:03

## 2020-05-18 RX ADMIN — DIPHENHYDRAMINE HYDROCHLORIDE 25 MG: 25 CAPSULE ORAL at 00:54

## 2020-05-18 RX ADMIN — NICOTINE 1 PATCH: 21 PATCH, EXTENDED RELEASE TRANSDERMAL at 08:03

## 2020-05-18 RX ADMIN — BUPRENORPHINE HYDROCHLORIDE, NALOXONE HYDROCHLORIDE 1 FILM: 4; 1 FILM, SOLUBLE BUCCAL; SUBLINGUAL at 15:25

## 2020-05-18 RX ADMIN — CLONIDINE HYDROCHLORIDE 0.1 MG: 0.1 TABLET ORAL at 22:03

## 2020-05-18 RX ADMIN — DIPHENHYDRAMINE HYDROCHLORIDE 25 MG: 25 CAPSULE ORAL at 10:13

## 2020-05-18 RX ADMIN — VANCOMYCIN HYDROCHLORIDE 1500 MG: 5 INJECTION, POWDER, LYOPHILIZED, FOR SOLUTION INTRAVENOUS at 15:25

## 2020-05-18 RX ADMIN — VANCOMYCIN HYDROCHLORIDE 1500 MG: 5 INJECTION, POWDER, LYOPHILIZED, FOR SOLUTION INTRAVENOUS at 00:54

## 2020-05-18 ASSESSMENT — ACTIVITIES OF DAILY LIVING (ADL)
ADLS_ACUITY_SCORE: 10

## 2020-05-18 NOTE — PROGRESS NOTES
[facility]    Infectious Disease Progress Note    Date of Service (when I saw the patient): 05/14/2020     Assessment & Plan   Jcarlos Maravilla is a 24 year old male who was admitted on 5/5/2020.     Impression:  1. 24 y.o male with IV heroin use.   2. Admitted with right antecubital superficial thrombophlebitis.   3. Bacteremic with MSSA. Only 1/4 cultures.   4. On ancef.   5. TTE negative.      Recommendations:   1. Noted rash on the abscesses and flank along with left UE, could be ancef, stoped and started vanco instead , rash noted to have improved.   2. TTE and SKYE negative for endocarditis.   3. Given MSSA bacteremia anticipate 4 weeks total of IV antibiotics  day 13/28 today.     Multiple dispositon issues      Bekah Ackerman MD    Interval History    Rash on the torso and UE much improved   Repeat cultures are negative so far   No new complaints     Physical Exam   Temp: 97.8  F (36.6  C) Temp src: Oral BP: 113/71 Pulse: 76 Heart Rate: 67 Resp: 18 SpO2: 95 % O2 Device: None (Room air)    Vitals:    05/14/20 0500 05/17/20 0626 05/18/20 0600   Weight: 98 kg (216 lb) 100.2 kg (220 lb 12.8 oz) 100.7 kg (222 lb)     Vital Signs with Ranges  Temp:  [97.8  F (36.6  C)-98  F (36.7  C)] 97.8  F (36.6  C)  Pulse:  [76] 76  Heart Rate:  [67-79] 67  Resp:  [14-18] 18  BP: (106-113)/(61-71) 113/71  SpO2:  [95 %-97 %] 95 %    Constitutional: Awake, alert, cooperative, no apparent distress  Lungs: Clear to auscultation bilaterally, no crackles or wheezing  Cardiovascular: Regular rate and rhythm, normal S1 and S2, and no murmur noted  Abdomen: Normal bowel sounds, soft, non-distended, non-tender  Skin: Rash on UE and torso   Other:    Medications       buprenorphine HCl-naloxone HCl  1 Film Sublingual TID     cloNIDine  0.1 mg Oral At Bedtime     nicotine  1 patch Transdermal Daily     nicotine   Transdermal Q8H     sodium chloride (PF)  3 mL Intracatheter Q8H     vancomycin (VANCOCIN) IV  1,500 mg  Intravenous Q8H       Data   All microbiology laboratory data reviewed.  Recent Labs   Lab Test 05/17/20  0616 05/14/20  0603 05/07/20  0610   WBC 6.2 5.1 5.5   HGB 12.4* 12.2* 11.0*   HCT 40.4 39.4* 33.8*   MCV 57* 57* 53*    344 248     Recent Labs   Lab Test 05/18/20  0546 05/17/20  0616 05/16/20  0602   CR 0.61* 0.77 1.04     No lab results found.  Recent Labs   Lab Test 05/06/20  2335 05/06/20  1129 05/05/20  1106 05/05/20  1043 05/04/20  0138 05/04/20  0046   CULT No growth No growth No growth No growth No growth Cultured on the 1st day of incubation:  Staphylococcus aureus  *  Critical Value/Significant Value, preliminary result only, called to and read back by  Dr Mims, On call hospitalist, 5/5/20 @ 1998 TF    (Note)  POSITIVE for STAPHYLOCOCCUS AUREUS and NEGATIVE for the mecA gene  (not MRSA) by Verigene multiplex nucleic acid test. The mecA gene was  not detected. Final identification and antimicrobial susceptibility  testing will be verified by standard methods.    Specimen tested with Verigene multiplex, gram-positive blood culture  nucleic acid test for the following targets: Staph aureus, Staph  epidermidis, Staph lugdunensis, other Staph species, Enterococcus  faecalis, Enterococcus faecium, Streptococcus species, S. agalactiae,  S. anginosus grp., S. pneumoniae, S. pyogenes, Listeria sp., mecA  (methicillin resistance) and Rico/B (vancomycin resistance).    Critical Value/Significant Value called to and read back by DR YURI MIMS ON CALL HOSPITALIST [facility] [id number] 05.05.20 CF         Attestation:  Total time on the floor involved in the patient's care: 35 minutes. Total time spent in counseling/care coordination: >50%

## 2020-05-18 NOTE — PROGRESS NOTES
[facility]    Internal Medicine Hospitalist Progress Note  05/18/2020  I evaluated patient on the above date.      Assessment & Plan   Jcarlos Maravilla is a 24 year old male with a past medical history including IV heroin, who was initially hospitalization at [facility] 5/3-5/4 with febrile illness associated with IV heroin usage. However, after discharge, blood cultures became positive for MSSA and pt was subsequently directly admitted to [facility] 5/5/2020 for further workup including SKYE.    MSSA bacteremia, suspect related to IV drug use.  * Initially presented to [facility] evening 5/3/2020 following encouragement from police officers to seek evaluation following use of heroin with his friend, who overdosed. On initial evaluation there, was febrile, with signs of sepsis with elevated lactic acid, procalcitonin 1.22. Subsequently admitted and treated with broad antibiotics. COVID-19 PCR 5/4 negative. TTE 5/4 negative for vegetation. Sepsis resolved and pt was discharged 5/4 on Augmentin (for thrombophlebitis). However, on 5/5, blood cultures from 5/3 came back positive for MSSA and pt sent to [facility] ED for repeat BC's and subsequently directly admitted to [facility] 5/5.   * Started on cefazolin on admit 5/5. SKYE 5/6 negative for vegetations. ID consulted. BC's from 5/5, 5/6 and 05/07 NGTD.  - Continue cefazolin (started 5/5) - plan 4 weeks of IV antibiotics (stop 6/2/2020); will likely remain hospitalized here to complete the course unless accepting TCU can be found (unlikely).  -Cefazolin was stopped and vancomycin was started due to the rash and is planning day 13/28 of antibiotic  - Midline placed on 05/08 but kinked later on so removed  - currently had an PIV  - Monitor cultures.  Negative so far  - Appreciate ID help    Superficial occlusive thrombophlebitis of right median cubital vein.  * Diagnosed by US at [facility] 5/4. Was started on Eliquis at [facility] 5/4 and discharged on  Augmentin. Did not fill scripts yet after discharge.  * Thrombophlebitis improved despite not continuing Eliquis after recent discharge. Improved after admission off anticoagulation.  - Continue off anticoagulation.  - Monitor site of thrombophlebitis, if worsens, could consider restarting AC.  - On antibiotics as above.    IVDU - heroin.  * Patient reported using IV heroin use daily. He started using heroin 2 years ago and prefers IV injections. He admits to using around 1-2 gm daily. He had an accidental overdose 3/2020 requiring 12 mg of narcan. Patients father was a known drug user and  of an overdose. He reportedly was working on getting enrolled in [employer] in [place] but had been waiting due to lack of insurance.   * On admit, patient expressed interest in Suboxone. Seen by Psychiatry consulted  and started on Suboxone.  - Psych reconsulted on - currently on Subaxone 1 film TID  - Clonidine changed from prn to 0.1 mg po at bedtime   - plan continue at discharge, consult Psychiatry to order for discharge, appreciate help..  - Continue PRN loperamide.  - Follow-up CD treatment after discharge.    Anemia, suspect chronic disease.  Pt anemic 10-11 range noted during recent hospitalization. No obvious signs of blood loss. Hgb 11.0 on admit . 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  Recent Labs   Lab 20  0616 20  0603   HGB 12.4* 12.2*   - Monitor CBC.  - Consider prbc transfusion if hgb </= 7.0 or if significant bleeding with hemodynamic instability or if symptomatic.  -His peripheral smear is suggesting a possible hemoglobinopathy, will order electrophoresis    Nicotine dependence  Patient reported smoking 1.5 PPD. Started on nicotine patch.  - Continue nicotine 21 mg patch.    Skin rash  - noted to have a rash on his forearms bilateral and left sided chest, the rash is itchy  - contact dermatitis vs drug-induced?  -  cortisone cream TID prn, Benadryl po prn  - the rash on this forearms improved, still present over left side of the chest       Diet: Regular Diet Adult    Prophylaxis: PCD's, ambulation.   Cook Catheter: not present  Code Status: Full Code      Disposition Plan   Expected discharge: after 6/2/2020, recommended to prior living arrangement after completion of IV antibiotics (home not an option given IV drug use; TCU's unlikely to accept pt given IV drug use).    Entered: Gloria Farrell MD 05/18/2020, 2:10 PM     Communication.  Discussed with the patient    Interval History    Patient is awake, communicative today, he mentions that the rash is improved significantly since antibiotics changed.  No other issues noted.  -Data reviewed today: I reviewed all new labs and imaging over the last 24 hours. I personally reviewed no images or EKG's today.    Physical Exam   Heart Rate: 67, Blood pressure 113/71, pulse 76, temperature 97.8  F (36.6  C), temperature source Oral, resp. rate 18, weight 100.7 kg (222 lb), SpO2 95 %.  Vitals:    05/14/20 0500 05/17/20 0626 05/18/20 0600   Weight: 98 kg (216 lb) 100.2 kg (220 lb 12.8 oz) 100.7 kg (222 lb)     Vital Signs with Ranges  Temp:  [97.8  F (36.6  C)-98  F (36.7  C)] 97.8  F (36.6  C)  Pulse:  [76] 76  Heart Rate:  [67-79] 67  Resp:  [14-18] 18  BP: (106-113)/(61-71) 113/71  SpO2:  [95 %-97 %] 95 %  Patient Vitals for the past 24 hrs:   BP Temp Temp src Pulse Heart Rate Resp SpO2 Weight   05/18/20 0852 -- -- -- -- -- 18 -- --   05/18/20 0804 113/71 97.8  F (36.6  C) Oral 76 -- 18 95 % --   05/18/20 0600 -- -- -- -- -- -- -- 100.7 kg (222 lb)   05/18/20 0300 112/61 98  F (36.7  C) Oral -- 67 15 95 % --   05/18/20 0115 110/65 98  F (36.7  C) Oral -- 78 14 95 % --   05/17/20 1547 106/62 97.9  F (36.6  C) Oral -- 79 16 97 % --     I/O's Last 24 hours  I/O last 3 completed shifts:  In: 120 [P.O.:120]  Out: -     Constitutional: Awake, alert, pleasant.  Respiratory: Bilateral air  entry, no wheezing, no crackles or wheezes.  Cardiovascular: S1S2, RRR, no m/r/g.  GI: abd s0ft, nonT, nonD, BS present  Skin/Integumen: R AC region with improved swelling/firmness and bruising; macular rash over his forearms bilateral- improved and left sided chest wall.  Extremities- no leg swellings        Data   Recent Labs   Lab 05/18/20  0546 05/17/20  0616 05/16/20  0602  05/14/20  0603   WBC  --  6.2  --   --  5.1   HGB  --  12.4*  --   --  12.2*   MCV  --  57*  --   --  57*   PLT  --  306  --   --  344   NA  --  136  --   --  138   POTASSIUM  --  4.4  --   --  4.3   CHLORIDE  --  104  --   --  104   CO2  --  27  --   --  29   BUN  --  8  --   --  10   CR 0.61* 0.77 1.04   < > 0.62*   ANIONGAP  --  5  --   --  5   MAGALI  --  9.4  --   --  9.2   GLC  --  86  --   --  93    < > = values in this interval not displayed.     Recent Labs   Lab Test 05/17/20  0616 05/14/20  0603 05/07/20  0610 05/06/20  0629 05/05/20  1043   GLC 86 93 84 95 95         No results found for this or any previous visit (from the past 24 hour(s)).    Medications   All medications were reviewed.      buprenorphine HCl-naloxone HCl  1 Film Sublingual TID     cloNIDine  0.1 mg Oral At Bedtime     nicotine  1 patch Transdermal Daily     nicotine   Transdermal Q8H     sodium chloride (PF)  3 mL Intracatheter Q8H     vancomycin (VANCOCIN) IV  1,500 mg Intravenous Q8H

## 2020-05-18 NOTE — PLAN OF CARE
VSS. Denies pain. Rash on torso and UE improving. Benadryl x 1 this shift. On IV antibiotic. A&OX4.

## 2020-05-19 LAB
CREAT SERPL-MCNC: 0.66 MG/DL (ref 0.66–1.25)
GFR SERPL CREATININE-BSD FRML MDRD: >90 ML/MIN/{1.73_M2}
HGB A1 MFR BLD: 92.9 % (ref 95–97.9)
HGB A2 MFR BLD: 6.4 % (ref 2–3.5)
HGB C MFR BLD: 0 % (ref 0–0)
HGB E MFR BLD: 0 % (ref 0–0)
HGB F MFR BLD: 0.7 % (ref 0–2.1)
HGB FRACT BLD ELPH-IMP: ABNORMAL
HGB OTHER MFR BLD: 0 % (ref 0–0)
HGB S BLD QL SOLY: ABNORMAL
HGB S MFR BLD: 0 % (ref 0–0)
PATH INTERP BLD-IMP: ABNORMAL
VANCOMYCIN SERPL-MCNC: 13 MG/L

## 2020-05-19 PROCEDURE — 25000132 ZZH RX MED GY IP 250 OP 250 PS 637: Performed by: PSYCHIATRY & NEUROLOGY

## 2020-05-19 PROCEDURE — 80202 ASSAY OF VANCOMYCIN: CPT | Performed by: INTERNAL MEDICINE

## 2020-05-19 PROCEDURE — 36415 COLL VENOUS BLD VENIPUNCTURE: CPT | Performed by: INTERNAL MEDICINE

## 2020-05-19 PROCEDURE — 12000000 ZZH R&B MED SURG/OB

## 2020-05-19 PROCEDURE — 25000128 H RX IP 250 OP 636: Performed by: INTERNAL MEDICINE

## 2020-05-19 PROCEDURE — 82565 ASSAY OF CREATININE: CPT | Performed by: INTERNAL MEDICINE

## 2020-05-19 PROCEDURE — 25000132 ZZH RX MED GY IP 250 OP 250 PS 637: Performed by: INTERNAL MEDICINE

## 2020-05-19 PROCEDURE — 25000132 ZZH RX MED GY IP 250 OP 250 PS 637: Performed by: NURSE PRACTITIONER

## 2020-05-19 PROCEDURE — 99232 SBSQ HOSP IP/OBS MODERATE 35: CPT | Performed by: INTERNAL MEDICINE

## 2020-05-19 PROCEDURE — 25800030 ZZH RX IP 258 OP 636: Performed by: INTERNAL MEDICINE

## 2020-05-19 RX ADMIN — CLONIDINE HYDROCHLORIDE 0.1 MG: 0.1 TABLET ORAL at 23:10

## 2020-05-19 RX ADMIN — NICOTINE 1 PATCH: 21 PATCH, EXTENDED RELEASE TRANSDERMAL at 09:34

## 2020-05-19 RX ADMIN — VANCOMYCIN HYDROCHLORIDE 1500 MG: 5 INJECTION, POWDER, LYOPHILIZED, FOR SOLUTION INTRAVENOUS at 16:18

## 2020-05-19 RX ADMIN — BUPRENORPHINE HYDROCHLORIDE, NALOXONE HYDROCHLORIDE 1 FILM: 4; 1 FILM, SOLUBLE BUCCAL; SUBLINGUAL at 09:34

## 2020-05-19 RX ADMIN — DIPHENHYDRAMINE HYDROCHLORIDE 25 MG: 25 CAPSULE ORAL at 16:19

## 2020-05-19 RX ADMIN — DIPHENHYDRAMINE HYDROCHLORIDE 25 MG: 25 CAPSULE ORAL at 00:10

## 2020-05-19 RX ADMIN — VANCOMYCIN HYDROCHLORIDE 1500 MG: 5 INJECTION, POWDER, LYOPHILIZED, FOR SOLUTION INTRAVENOUS at 08:05

## 2020-05-19 RX ADMIN — BUPRENORPHINE HYDROCHLORIDE, NALOXONE HYDROCHLORIDE 1 FILM: 4; 1 FILM, SOLUBLE BUCCAL; SUBLINGUAL at 16:14

## 2020-05-19 RX ADMIN — Medication 1 MG: at 00:10

## 2020-05-19 RX ADMIN — VANCOMYCIN HYDROCHLORIDE 1500 MG: 5 INJECTION, POWDER, LYOPHILIZED, FOR SOLUTION INTRAVENOUS at 00:10

## 2020-05-19 RX ADMIN — DIPHENHYDRAMINE HYDROCHLORIDE 25 MG: 25 CAPSULE ORAL at 23:11

## 2020-05-19 RX ADMIN — Medication 1 MG: at 23:13

## 2020-05-19 RX ADMIN — DIPHENHYDRAMINE HYDROCHLORIDE 25 MG: 25 CAPSULE ORAL at 09:34

## 2020-05-19 RX ADMIN — BUPRENORPHINE HYDROCHLORIDE, NALOXONE HYDROCHLORIDE 1 FILM: 4; 1 FILM, SOLUBLE BUCCAL; SUBLINGUAL at 23:10

## 2020-05-19 RX ADMIN — VANCOMYCIN HYDROCHLORIDE 1500 MG: 5 INJECTION, POWDER, LYOPHILIZED, FOR SOLUTION INTRAVENOUS at 23:14

## 2020-05-19 ASSESSMENT — ACTIVITIES OF DAILY LIVING (ADL)
ADLS_ACUITY_SCORE: 10

## 2020-05-19 NOTE — PHARMACY-VANCOMYCIN DOSING SERVICE
Pharmacy Vancomycin Note  Date of Service May 19, 2020  Patient's  1995   24 year old, male    Indication: Bacteremia - MSSA w/SKYE negative for vegetation   Goal Trough Level: 10-15 mg/L Previously targeting 15-20 mg/L.  Lowing goal given MRSA has been ruled out  Day of Therapy: 6  Current Vancomycin regimen:  1500 mg IV q8h    Current estimated CrCl = Estimated Creatinine Clearance: 215.5 mL/min (based on SCr of 0.66 mg/dL).    Creatinine for last 3 days  2020:  6:16 AM Creatinine 0.77 mg/dL  2020:  5:46 AM Creatinine 0.61 mg/dL  2020:  6:51 AM Creatinine 0.66 mg/dL    Recent Vancomycin Levels (past 3 days)  2020:  3:29 PM Vancomycin Level 13.0 mg/L    Vancomycin IV Administrations (past 72 hours)                   vancomycin 1500 mg in 0.9% NaCl 250 ml intermittent infusion 1,500 mg (mg) 1,500 mg Given 20 1618     1,500 mg Given  0805     1,500 mg Given  0010     1,500 mg Given 20 1525     1,500 mg Given  0803     1,500 mg Given  0054     1,500 mg Given 20 1616     1,500 mg Given  0836     1,500 mg Given 20 2105                Nephrotoxins and other renal medications (From now, onward)    Start     Dose/Rate Route Frequency Ordered Stop    20 0600  vancomycin 1500 mg in 0.9% NaCl 250 ml intermittent infusion 1,500 mg      1,500 mg  over 90 Minutes Intravenous EVERY 8 HOURS 20 0030               Contrast Orders - past 72 hours (72h ago, onward)    None          Interpretation of levels and current regimen:  Trough level is  Therapeutic    Has serum creatinine changed > 50% in last 72 hours: No    Urine output:  unable to determine    Renal Function: Stable    Plan:  1.  Continue Current Dose  2.  Pharmacy will check trough levels as appropriate in 3-5 Days.    3. Serum creatinine levels will be ordered a minimum of twice weekly.      Kendall Marin, AngD        .

## 2020-05-19 NOTE — PROGRESS NOTES
SW:  D: Call placed to Julia with [facility] regarding MA application and discharge date. Request has been made to financial counselor regarding assisting with MA application. Julia called back and stated that at this time there is no pending MA application.     P: will continue to follow    DELANO Mckeon    [facility]

## 2020-05-19 NOTE — PROGRESS NOTES
[facility]    Internal Medicine Hospitalist Progress Note  05/19/2020  I evaluated patient on the above date.      Assessment & Plan   Jcarlos Maravilla is a 24 year old male with a past medical history including IV heroin, who was initially hospitalization at [facility] 5/3-5/4 with febrile illness associated with IV heroin usage. However, after discharge, blood cultures became positive for MSSA and pt was subsequently directly admitted to [facility] 5/5/2020 for further workup including SKYE.    MSSA bacteremia, suspect related to IV drug use.  * Initially presented to [facility] evening 5/3/2020 following encouragement from police officers to seek evaluation following use of heroin with his friend, who overdosed. On initial evaluation there, was febrile, with signs of sepsis with elevated lactic acid, procalcitonin 1.22. Subsequently admitted and treated with broad antibiotics. COVID-19 PCR 5/4 negative. TTE 5/4 negative for vegetation. Sepsis resolved and pt was discharged 5/4 on Augmentin (for thrombophlebitis). However, on 5/5, blood cultures from 5/3 came back positive for MSSA and pt sent to [facility] ED for repeat BC's and subsequently directly admitted to [facility] 5/5.   * Started on cefazolin on admit 5/5. SKYE 5/6 negative for vegetations. ID consulted. BC's from 5/5, 5/6 and 05/07 NGTD.  - Continue cefazolin (started 5/5) - plan 4 weeks of IV antibiotics (stop 6/2/2020); will likely remain hospitalized here to complete the course unless accepting TCU can be found (unlikely).  -Cefazolin was stopped and vancomycin was started due to the rash and is planning day 14/28 of antibiotic  - Midline placed on 05/08 but kinked later on so removed  - currently had an PIV  - Monitor cultures.  Negative so far  - Appreciate ID help    Superficial occlusive thrombophlebitis of right median cubital vein.  * Diagnosed by US at [facility] 5/4. Was started on Eliquis at [facility] 5/4 and discharged on  Augmentin. Did not fill scripts yet after discharge.  * Thrombophlebitis improved despite not continuing Eliquis after recent discharge. Improved after admission off anticoagulation.  - Continue off anticoagulation.  - Monitor site of thrombophlebitis, if worsens, could consider restarting AC.  - On antibiotics as above.    IVDU - heroin.  * Patient reported using IV heroin use daily. He started using heroin 2 years ago and prefers IV injections. He admits to using around 1-2 gm daily. He had an accidental overdose 3/2020 requiring 12 mg of narcan. Patients father was a known drug user and  of an overdose. He reportedly was working on getting enrolled in [employer] in [place] but had been waiting due to lack of insurance.   * On admit, patient expressed interest in Suboxone. Seen by Psychiatry consulted  and started on Suboxone.  - Psych reconsulted on - currently on Subaxone 1 film TID  - Clonidine changed from prn to 0.1 mg po at bedtime   - plan continue at discharge, consult Psychiatry to order for discharge, appreciate help..  - Continue PRN loperamide.  - Follow-up CD treatment after discharge.    Anemia, suspect chronic disease.  Pt anemic 10-11 range noted during recent hospitalization. No obvious signs of blood loss. Hgb 11.0 on admit . 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  Recent Labs   Lab 20  0616 20  0603   HGB 12.4* 12.2*   - Monitor CBC.  - Consider prbc transfusion if hgb </= 7.0 or if significant bleeding with hemodynamic instability or if symptomatic.  -His peripheral smear is suggesting a possible hemoglobinopathy, will order electrophoresis    Nicotine dependence  Patient reported smoking 1.5 PPD. Started on nicotine patch.  - Continue nicotine 21 mg patch.    Skin rash  - noted to have a rash on his forearms bilateral and left sided chest, the rash is itchy  - contact dermatitis vs drug-induced?  -  cortisone cream TID prn, Benadryl po prn  - the rash on this forearms improved, still present over left side of the chest       Diet: Regular Diet Adult    Prophylaxis: PCD's, ambulation.   Cook Catheter: not present  Code Status: Full Code      Disposition Plan   Expected discharge: after 6/2/2020, recommended to prior living arrangement after completion of IV antibiotics (home not an option given IV drug use; TCU's unlikely to accept pt given IV drug use).    Entered: Gloria Farrell MD 05/19/2020, 11:58 AM     Communication.  Discussed with the patient    Interval History    Patient is awake and resting, he mentions that the rash and the itching is improved significantly, we discussed about his antibiotics recently been changed to a different one.  He denies any new complaints  -Data reviewed today: I reviewed all new labs and imaging over the last 24 hours. I personally reviewed no images or EKG's today.    Physical Exam   Heart Rate: 74, Blood pressure 113/65, pulse 93, temperature 98  F (36.7  C), temperature source Oral, resp. rate 16, weight 100.7 kg (222 lb), SpO2 99 %.  Vitals:    05/14/20 0500 05/17/20 0626 05/18/20 0600   Weight: 98 kg (216 lb) 100.2 kg (220 lb 12.8 oz) 100.7 kg (222 lb)     Vital Signs with Ranges  Temp:  [97.4  F (36.3  C)-98.2  F (36.8  C)] 98  F (36.7  C)  Pulse:  [93] 93  Heart Rate:  [69-94] 74  Resp:  [16-18] 16  BP: (113-132)/(65-71) 113/65  SpO2:  [97 %-99 %] 99 %  Patient Vitals for the past 24 hrs:   BP Temp Temp src Pulse Heart Rate Resp SpO2   05/19/20 1104 113/65 98  F (36.7  C) Oral -- 74 16 99 %   05/19/20 1019 -- -- -- -- -- 16 --   05/18/20 2201 132/71 98.2  F (36.8  C) Oral 93 94 16 97 %   05/18/20 1617 -- -- -- -- -- 16 --   05/18/20 1603 118/69 97.4  F (36.3  C) Oral -- 69 18 98 %     I/O's Last 24 hours  No intake/output data recorded.    Constitutional: Awake, alert, pleasant.  Respiratory: Bilateral air entry, no wheezing, no crackles or wheezes.  Cardiovascular:  S1S2, RRR, no m/r/g.  GI: abd s0ft, nonT, nonD, BS present  Skin/Integumen: R AC region with improved swelling/firmness and bruising; macular rash over his forearms bilateral- improved and left sided chest wall.  Extremities- no leg swellings        Data   Recent Labs   Lab 05/19/20  0651 05/18/20  0546 05/17/20  0616  05/14/20  0603   WBC  --   --  6.2  --  5.1   HGB  --   --  12.4*  --  12.2*   MCV  --   --  57*  --  57*   PLT  --   --  306  --  344   NA  --   --  136  --  138   POTASSIUM  --   --  4.4  --  4.3   CHLORIDE  --   --  104  --  104   CO2  --   --  27  --  29   BUN  --   --  8  --  10   CR 0.66 0.61* 0.77   < > 0.62*   ANIONGAP  --   --  5  --  5   MAGALI  --   --  9.4  --  9.2   GLC  --   --  86  --  93    < > = values in this interval not displayed.     Recent Labs   Lab Test 05/17/20  0616 05/14/20  0603 05/07/20  0610 05/06/20  0629 05/05/20  1043   GLC 86 93 84 95 95         No results found for this or any previous visit (from the past 24 hour(s)).    Medications   All medications were reviewed.      buprenorphine HCl-naloxone HCl  1 Film Sublingual TID     cloNIDine  0.1 mg Oral At Bedtime     nicotine  1 patch Transdermal Daily     nicotine   Transdermal Q8H     sodium chloride (PF)  3 mL Intracatheter Q8H     vancomycin (VANCOCIN) IV  1,500 mg Intravenous Q8H

## 2020-05-19 NOTE — PLAN OF CARE
VSS. Denies pain. Up independently. Rash torso and BUE, getting better. Benadryl x 2 this shift for itching. A&OX4.

## 2020-05-20 LAB
CREAT SERPL-MCNC: 0.67 MG/DL (ref 0.66–1.25)
GFR SERPL CREATININE-BSD FRML MDRD: >90 ML/MIN/{1.73_M2}

## 2020-05-20 PROCEDURE — 25000132 ZZH RX MED GY IP 250 OP 250 PS 637: Performed by: INTERNAL MEDICINE

## 2020-05-20 PROCEDURE — 25000132 ZZH RX MED GY IP 250 OP 250 PS 637: Performed by: PSYCHIATRY & NEUROLOGY

## 2020-05-20 PROCEDURE — 12000000 ZZH R&B MED SURG/OB

## 2020-05-20 PROCEDURE — 25800030 ZZH RX IP 258 OP 636: Performed by: INTERNAL MEDICINE

## 2020-05-20 PROCEDURE — 82565 ASSAY OF CREATININE: CPT | Performed by: INTERNAL MEDICINE

## 2020-05-20 PROCEDURE — 25000128 H RX IP 250 OP 636: Performed by: INTERNAL MEDICINE

## 2020-05-20 PROCEDURE — 36415 COLL VENOUS BLD VENIPUNCTURE: CPT | Performed by: INTERNAL MEDICINE

## 2020-05-20 PROCEDURE — 25000132 ZZH RX MED GY IP 250 OP 250 PS 637: Performed by: NURSE PRACTITIONER

## 2020-05-20 PROCEDURE — 99232 SBSQ HOSP IP/OBS MODERATE 35: CPT | Performed by: INTERNAL MEDICINE

## 2020-05-20 RX ADMIN — CLONIDINE HYDROCHLORIDE 0.1 MG: 0.1 TABLET ORAL at 23:17

## 2020-05-20 RX ADMIN — VANCOMYCIN HYDROCHLORIDE 1500 MG: 5 INJECTION, POWDER, LYOPHILIZED, FOR SOLUTION INTRAVENOUS at 08:15

## 2020-05-20 RX ADMIN — DIPHENHYDRAMINE HYDROCHLORIDE 25 MG: 25 CAPSULE ORAL at 09:50

## 2020-05-20 RX ADMIN — ACETAMINOPHEN 650 MG: 325 TABLET, FILM COATED ORAL at 08:15

## 2020-05-20 RX ADMIN — BUPRENORPHINE HYDROCHLORIDE, NALOXONE HYDROCHLORIDE 1 FILM: 4; 1 FILM, SOLUBLE BUCCAL; SUBLINGUAL at 16:42

## 2020-05-20 RX ADMIN — VANCOMYCIN HYDROCHLORIDE 1500 MG: 5 INJECTION, POWDER, LYOPHILIZED, FOR SOLUTION INTRAVENOUS at 23:17

## 2020-05-20 RX ADMIN — NICOTINE 1 PATCH: 21 PATCH, EXTENDED RELEASE TRANSDERMAL at 09:53

## 2020-05-20 RX ADMIN — ACETAMINOPHEN 650 MG: 325 TABLET, FILM COATED ORAL at 16:47

## 2020-05-20 RX ADMIN — BUPRENORPHINE HYDROCHLORIDE, NALOXONE HYDROCHLORIDE 1 FILM: 4; 1 FILM, SOLUBLE BUCCAL; SUBLINGUAL at 23:17

## 2020-05-20 RX ADMIN — DIPHENHYDRAMINE HYDROCHLORIDE 25 MG: 25 CAPSULE ORAL at 16:47

## 2020-05-20 RX ADMIN — VANCOMYCIN HYDROCHLORIDE 1500 MG: 5 INJECTION, POWDER, LYOPHILIZED, FOR SOLUTION INTRAVENOUS at 16:43

## 2020-05-20 RX ADMIN — BUPRENORPHINE HYDROCHLORIDE, NALOXONE HYDROCHLORIDE 1 FILM: 4; 1 FILM, SOLUBLE BUCCAL; SUBLINGUAL at 09:53

## 2020-05-20 ASSESSMENT — ACTIVITIES OF DAILY LIVING (ADL)
ADLS_ACUITY_SCORE: 10
ADLS_ACUITY_SCORE: 14
ADLS_ACUITY_SCORE: 14
ADLS_ACUITY_SCORE: 10
ADLS_ACUITY_SCORE: 14
ADLS_ACUITY_SCORE: 10

## 2020-05-20 NOTE — PLAN OF CARE
VSS. Tylenol given for left lower tooth pain. Up independently. Rash getting better. Benadryl x 2 this shift. A&OX4.

## 2020-05-20 NOTE — PROGRESS NOTES
[facility]  Hospitalist Progress Note  Rene Mccray MD  05/20/2020    Assessment & Plan   Jcarlos Maravilla is a 24 year old male with a past medical history including IV heroin, who was initially hospitalization at [facility] 5/3-5/4 with febrile illness associated with IV heroin usage. However, after discharge, blood cultures became positive for MSSA and pt was subsequently directly admitted to [facility] 5/5/2020 for further workup including SKYE.     MSSA bacteremia, suspect related to IV drug use.  * Initially presented to [facility] evening 5/3/2020 following encouragement from police officers to seek evaluation following use of heroin with his friend, who overdosed. On initial evaluation there, was febrile, with signs of sepsis with elevated lactic acid, procalcitonin 1.22. Subsequently admitted and treated with broad antibiotics. COVID-19 PCR 5/4 negative. TTE 5/4 negative for vegetation. Sepsis resolved and pt was discharged 5/4 on Augmentin (for thrombophlebitis). However, on 5/5, blood cultures from 5/3 came back positive for MSSA and pt sent to [facility] ED for repeat BC's and subsequently directly admitted to [facility] 5/5.   * Started on cefazolin on admit 5/5. SKYE 5/6 negative for vegetations. ID consulted. BC's from 5/5, 5/6 and 05/07 NGTD.  - Continue cefazolin (started 5/5) - plan 4 weeks of IV antibiotics (stop 6/2/2020); will likely remain hospitalized here to complete the course unless accepting TCU can be found (unlikely).  -Cefazolin was stopped and vancomycin was started due to the rash and is planning day 14/28 of antibiotic  - Midline placed on 05/08 but kinked later on so removed  - currently had an PIV  - Monitor cultures.  Negative so far  - Appreciate ID help     Superficial occlusive thrombophlebitis of right median cubital vein.  * Diagnosed by US at [facility] 5/4. Was started on Eliquis at [facility] 5/4 and discharged on Augmentin. Did not fill scripts yet after  discharge.  * Thrombophlebitis improved despite not continuing Eliquis after recent discharge. Improved after admission off anticoagulation.  - Continue off anticoagulation.  - Monitor site of thrombophlebitis, if worsens, could consider restarting AC.  - On antibiotics as above.     IVDU - heroin.  * Patient reported using IV heroin use daily. He started using heroin 2 years ago and prefers IV injections. He admits to using around 1-2 gm daily. He had an accidental overdose 3/2020 requiring 12 mg of narcan. Patients father was a known drug user and  of an overdose. He reportedly was working on getting enrolled in [employer] in [place] but had been waiting due to lack of insurance.   * On admit, patient expressed interest in Suboxone. Seen by Psychiatry consulted  and started on Suboxone.  - Psych reconsulted on - currently on Subaxone 1 film TID  - Clonidine changed from prn to 0.1 mg po at bedtime   - plan continue at discharge, consult Psychiatry to order for discharge, appreciate help..  - Continue PRN loperamide.  - Follow-up CD treatment after discharge.     Anemia, suspect chronic disease.  Pt anemic 10-11 range noted during recent hospitalization. No obvious signs of blood loss. Hgb 11.0 on admit . 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   - Monitor CBC.  - Consider prbc transfusion if hgb </= 7.0 or if significant bleeding with hemodynamic instability or if symptomatic.  -His peripheral smear is suggesting a possible hemoglobinopathy, hgb is improving, would reassess after infection is treated.     Nicotine dependence  Patient reported smoking 1.5 PPD. Started on nicotine patch.  - Continue nicotine 21 mg patch.     Drug rash  - noted to have a rash on his forearms bilateral and left sided chest, the rash is itchy  - suspected from IV abx  - cortisone cream TID prn, Benadryl po prn  - abx change to vancomycin  - rash is  improved     Diet: Regular Diet Adult    Prophylaxis: PCD's, ambulation.   Cook Catheter: not present  Code Status: Full Code          Disposition Plan     Expected discharge: after 6/2/2020, recommended to prior living arrangement after completion of IV antibiotics (home not an option given IV drug use; TCU's unlikely to accept pt given IV drug use).      Interval History   -- chart reviewed  -- rash is fading      -Data reviewed today: I reviewed all new labs and imaging over the last 24 hours. I personally reviewed no images or EKG's today.    Physical Exam   Heart Rate: 79, Blood pressure 115/67, pulse 67, temperature 98  F (36.7  C), temperature source Oral, resp. rate 16, weight 101.2 kg (223 lb 3.2 oz), SpO2 98 %.  Vitals:    05/17/20 0626 05/18/20 0600 05/20/20 0634   Weight: 100.2 kg (220 lb 12.8 oz) 100.7 kg (222 lb) 101.2 kg (223 lb 3.2 oz)     Vital Signs with Ranges  Temp:  [98  F (36.7  C)-98.4  F (36.9  C)] 98  F (36.7  C)  Pulse:  [67-83] 67  Heart Rate:  [77-79] 79  Resp:  [16] 16  BP: (114-121)/(66-76) 115/67  SpO2:  [95 %-98 %] 98 %  I/O's Last 24 hours  No intake/output data recorded.    Constitutional: Awake, alert, cooperative, no apparent distress  Respiratory: Clear to auscultation bilaterally, no crackles or wheezing  Cardiovascular: Regular rate and rhythm, normal S1 and S2, and no murmur noted  GI: Normal bowel sounds, soft, non-distended, non-tender  Skin/Integumen: + rash that is fading, no cyanosis, no edema  Other:      Medications   All medications were reviewed.      buprenorphine HCl-naloxone HCl  1 Film Sublingual TID     cloNIDine  0.1 mg Oral At Bedtime     nicotine  1 patch Transdermal Daily     nicotine   Transdermal Q8H     sodium chloride (PF)  3 mL Intracatheter Q8H     vancomycin (VANCOCIN) IV  1,500 mg Intravenous Q8H        Data   Recent Labs   Lab 05/20/20  0627 05/19/20  0651 05/18/20  0546 05/17/20  0616  05/14/20  0603   WBC  --   --   --  6.2  --  5.1   HGB  --   --    --  12.4*  --  12.2*   MCV  --   --   --  57*  --  57*   PLT  --   --   --  306  --  344   NA  --   --   --  136  --  138   POTASSIUM  --   --   --  4.4  --  4.3   CHLORIDE  --   --   --  104  --  104   CO2  --   --   --  27  --  29   BUN  --   --   --  8  --  10   CR 0.67 0.66 0.61* 0.77   < > 0.62*   ANIONGAP  --   --   --  5  --  5   MAGALI  --   --   --  9.4  --  9.2   GLC  --   --   --  86  --  93    < > = values in this interval not displayed.       No results found for this or any previous visit (from the past 24 hour(s)).    Rene Mccray MD  Text Page  (7am to 6pm)

## 2020-05-20 NOTE — PLAN OF CARE
Pt A&O x4. VSS on RA. IV SL, intermittent Vancomycin. Rash to torso and BUE, scattered. PRN Benadryl given x1 this shift. Pt independent in the room. Will continue to monitor.

## 2020-05-20 NOTE — PROGRESS NOTES
SAIRA:  D: Spoke to patient and informed him that [facility] has been trying to contact him to assist him with [place] [facility] application. He stated that afternoon is a good time to contact him and agreed to answer his room phone. SW confirmed that he still wanted to discharge from here and go directly to [facility] and he confirmed that is his plan. Call placed to [facility] to discuss current Rule 25. Because he's been inpatient for several weeks they will accept his current Rule 25 and update it when he arrives there. Carlin, patient's  will contact SAIRA next week regarding bed space. SAIRA updated him that end of antibiotic will be 6/3/20 and Carlin will attempt to save a bed for patient.     P: Will continue to follow    DELANO Mckeon    [facility]

## 2020-05-21 PROCEDURE — 12000000 ZZH R&B MED SURG/OB

## 2020-05-21 PROCEDURE — 36415 COLL VENOUS BLD VENIPUNCTURE: CPT | Performed by: INTERNAL MEDICINE

## 2020-05-21 PROCEDURE — 99207 ZZC CDG-MDM COMPONENT: MEETS MODERATE - UP CODED: CPT | Performed by: INTERNAL MEDICINE

## 2020-05-21 PROCEDURE — 25000132 ZZH RX MED GY IP 250 OP 250 PS 637: Performed by: NURSE PRACTITIONER

## 2020-05-21 PROCEDURE — 25000132 ZZH RX MED GY IP 250 OP 250 PS 637: Performed by: INTERNAL MEDICINE

## 2020-05-21 PROCEDURE — 25800030 ZZH RX IP 258 OP 636: Performed by: INTERNAL MEDICINE

## 2020-05-21 PROCEDURE — 25000132 ZZH RX MED GY IP 250 OP 250 PS 637: Performed by: PSYCHIATRY & NEUROLOGY

## 2020-05-21 PROCEDURE — 99232 SBSQ HOSP IP/OBS MODERATE 35: CPT | Performed by: INTERNAL MEDICINE

## 2020-05-21 PROCEDURE — 82565 ASSAY OF CREATININE: CPT | Performed by: INTERNAL MEDICINE

## 2020-05-21 PROCEDURE — 25000128 H RX IP 250 OP 636: Performed by: INTERNAL MEDICINE

## 2020-05-21 RX ADMIN — DIPHENHYDRAMINE HYDROCHLORIDE 25 MG: 25 CAPSULE ORAL at 00:53

## 2020-05-21 RX ADMIN — VANCOMYCIN HYDROCHLORIDE 1500 MG: 5 INJECTION, POWDER, LYOPHILIZED, FOR SOLUTION INTRAVENOUS at 08:53

## 2020-05-21 RX ADMIN — VANCOMYCIN HYDROCHLORIDE 1500 MG: 5 INJECTION, POWDER, LYOPHILIZED, FOR SOLUTION INTRAVENOUS at 15:24

## 2020-05-21 RX ADMIN — Medication 1 MG: at 00:53

## 2020-05-21 RX ADMIN — BUPRENORPHINE HYDROCHLORIDE, NALOXONE HYDROCHLORIDE 1 FILM: 4; 1 FILM, SOLUBLE BUCCAL; SUBLINGUAL at 08:53

## 2020-05-21 RX ADMIN — BUPRENORPHINE HYDROCHLORIDE, NALOXONE HYDROCHLORIDE 1 FILM: 4; 1 FILM, SOLUBLE BUCCAL; SUBLINGUAL at 15:23

## 2020-05-21 RX ADMIN — BUPRENORPHINE HYDROCHLORIDE, NALOXONE HYDROCHLORIDE 1 FILM: 4; 1 FILM, SOLUBLE BUCCAL; SUBLINGUAL at 23:19

## 2020-05-21 RX ADMIN — DIPHENHYDRAMINE HYDROCHLORIDE 25 MG: 25 CAPSULE ORAL at 15:23

## 2020-05-21 RX ADMIN — ACETAMINOPHEN 650 MG: 325 TABLET, FILM COATED ORAL at 00:53

## 2020-05-21 RX ADMIN — CLONIDINE HYDROCHLORIDE 0.1 MG: 0.1 TABLET ORAL at 23:16

## 2020-05-21 RX ADMIN — NICOTINE 1 PATCH: 21 PATCH, EXTENDED RELEASE TRANSDERMAL at 08:53

## 2020-05-21 ASSESSMENT — ACTIVITIES OF DAILY LIVING (ADL)
ADLS_ACUITY_SCORE: 14
ADLS_ACUITY_SCORE: 10

## 2020-05-21 NOTE — PROGRESS NOTES
Pt is mostly sleepy but arousable. VSS. IV vanco given. Denies pain. Independent in room. Will continue to monitor.

## 2020-05-21 NOTE — PROGRESS NOTES
[facility]  Hospitalist Progress Note  Rene Mccray MD  05/21/2020    Assessment & Plan   Jcarlos Maravilla is a 24 year old male with a past medical history including IV heroin, who was initially hospitalization at [facility] 5/3-5/4 with febrile illness associated with IV heroin usage. However, after discharge, blood cultures became positive for MSSA and pt was subsequently directly admitted to [facility] 5/5/2020 for further workup including SKYE.     MSSA bacteremia, suspect related to IV drug use.  * Initially presented to [facility] evening 5/3/2020 following encouragement from police officers to seek evaluation following use of heroin with his friend, who overdosed. On initial evaluation there, was febrile, with signs of sepsis with elevated lactic acid, procalcitonin 1.22. Subsequently admitted and treated with broad antibiotics. COVID-19 PCR 5/4 negative. TTE 5/4 negative for vegetation. Sepsis resolved and pt was discharged 5/4 on Augmentin (for thrombophlebitis). However, on 5/5, blood cultures from 5/3 came back positive for MSSA and pt sent to [facility] ED for repeat BC's and subsequently directly admitted to [facility] 5/5.   * Started on cefazolin on admit 5/5. SKYE 5/6 negative for vegetations. ID consulted. BC's from 5/5, 5/6 and 05/07 NGTD.  - Continue cefazolin (started 5/5) - plan 4 weeks of IV antibiotics (stop 6/2/2020); will likely remain hospitalized here to complete the course unless accepting TCU can be found (unlikely).  -Cefazolin was stopped and vancomycin was started due to the rash and is planning day 14/28 of antibiotic  - Midline placed on 05/08 but kinked later on so removed  - currently had an PIV  - Monitor cultures.  Negative so far     Superficial occlusive thrombophlebitis of right median cubital vein.  * Diagnosed by US at [facility] 5/4. Was started on Eliquis at [facility] 5/4 and discharged on Augmentin. Did not fill scripts yet after discharge.  *  Thrombophlebitis improved despite not continuing Eliquis after recent discharge. Improved after admission off anticoagulation.  - Continue off anticoagulation.  - Monitor site of thrombophlebitis, if worsens, could consider restarting AC.  - On antibiotics as above.     IVDU - heroin.  * Patient reported using IV heroin use daily. He started using heroin 2 years ago and prefers IV injections. He admits to using around 1-2 gm daily. He had an accidental overdose 3/2020 requiring 12 mg of narcan. Patients father was a known drug user and  of an overdose. He reportedly was working on getting enrolled in [employer] in [place] but had been waiting due to lack of insurance.   * On admit, patient expressed interest in Suboxone. Seen by Psychiatry consulted  and started on Suboxone.  - Psych reconsulted on - currently on Subaxone 1 film TID  - Clonidine changed from prn to 0.1 mg po at bedtime   - plan continue at discharge, consult Psychiatry to order for discharge, appreciate help..  - Continue PRN loperamide.  - Follow-up CD treatment after discharge.     Anemia, suspect chronic disease.  Pt anemic 10-11 range noted during recent hospitalization. No obvious signs of blood loss. Hgb 11.0 on admit . 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   - Monitor CBC.  - Consider prbc transfusion if hgb </= 7.0 or if significant bleeding with hemodynamic instability or if symptomatic.  -His peripheral smear is suggesting a possible hemoglobinopathy, hgb is improving, would reassess after infection is treated.     Nicotine dependence  Patient reported smoking 1.5 PPD. Started on nicotine patch.  - Continue nicotine 21 mg patch.     Drug rash  - noted to have a rash on his forearms bilateral and left sided chest, the rash is itchy  - suspected from IV abx  - cortisone cream TID prn, Benadryl po prn  - abx change to vancomycin  - rash continues to fade     Diet:  Regular Diet Adult    Prophylaxis: PCD's, ambulation.   Cook Catheter: not present  Code Status: Full Code          Disposition Plan     Expected discharge: after 6/2/2020, recommended to prior living arrangement after completion of IV antibiotics (home not an option given IV drug use; TCU's unlikely to accept pt given IV drug use).      Interval History   -- no complaints      -Data reviewed today: I reviewed all new labs and imaging over the last 24 hours. I personally reviewed no images or EKG's today.    Physical Exam   Heart Rate: 82, Blood pressure 117/73, pulse 84, temperature 98.6  F (37  C), temperature source Oral, resp. rate 16, weight 101.2 kg (223 lb), SpO2 97 %.  Vitals:    05/18/20 0600 05/20/20 0634 05/21/20 0546   Weight: 100.7 kg (222 lb) 101.2 kg (223 lb 3.2 oz) 101.2 kg (223 lb)     Vital Signs with Ranges  Temp:  [97.5  F (36.4  C)-98.6  F (37  C)] 98.6  F (37  C)  Pulse:  [60-84] 84  Heart Rate:  [82] 82  Resp:  [16-18] 16  BP: (110-117)/(66-73) 117/73  SpO2:  [95 %-97 %] 97 %  I/O's Last 24 hours  No intake/output data recorded.    Constitutional: Awake, alert, cooperative, no apparent distress  Respiratory: Clear to auscultation bilaterally, no crackles or wheezing  Cardiovascular: Regular rate and rhythm, normal S1 and S2, and no murmur noted  GI: Normal bowel sounds, soft, non-distended, non-tender  Skin/Integumen: + rash that is fading, no cyanosis, no edema  Other:      Medications   All medications were reviewed.      buprenorphine HCl-naloxone HCl  1 Film Sublingual TID     cloNIDine  0.1 mg Oral At Bedtime     nicotine  1 patch Transdermal Daily     nicotine   Transdermal Q8H     sodium chloride (PF)  3 mL Intracatheter Q8H     vancomycin (VANCOCIN) IV  1,500 mg Intravenous Q8H        Data   Recent Labs   Lab 05/21/20  0633 05/20/20  0627 05/19/20  0651  05/17/20  0616   WBC  --   --   --   --  6.2   HGB  --   --   --   --  12.4*   MCV  --   --   --   --  57*   PLT  --   --   --   --   306   NA  --   --   --   --  136   POTASSIUM  --   --   --   --  4.4   CHLORIDE  --   --   --   --  104   CO2  --   --   --   --  27   BUN  --   --   --   --  8   CR 0.67 0.67 0.66   < > 0.77   ANIONGAP  --   --   --   --  5   MAGALI  --   --   --   --  9.4   GLC  --   --   --   --  86    < > = values in this interval not displayed.       No results found for this or any previous visit (from the past 24 hour(s)).    Rene Mccray MD  Text Page  (7am to 6pm)

## 2020-05-22 LAB — VANCOMYCIN SERPL-MCNC: 15 MG/L

## 2020-05-22 PROCEDURE — 25000132 ZZH RX MED GY IP 250 OP 250 PS 637: Performed by: INTERNAL MEDICINE

## 2020-05-22 PROCEDURE — 25000128 H RX IP 250 OP 636: Performed by: INTERNAL MEDICINE

## 2020-05-22 PROCEDURE — 25000132 ZZH RX MED GY IP 250 OP 250 PS 637: Performed by: PSYCHIATRY & NEUROLOGY

## 2020-05-22 PROCEDURE — 25800030 ZZH RX IP 258 OP 636: Performed by: INTERNAL MEDICINE

## 2020-05-22 PROCEDURE — 80202 ASSAY OF VANCOMYCIN: CPT | Performed by: INTERNAL MEDICINE

## 2020-05-22 PROCEDURE — 12000000 ZZH R&B MED SURG/OB

## 2020-05-22 PROCEDURE — 25000132 ZZH RX MED GY IP 250 OP 250 PS 637: Performed by: NURSE PRACTITIONER

## 2020-05-22 PROCEDURE — 99232 SBSQ HOSP IP/OBS MODERATE 35: CPT | Performed by: INTERNAL MEDICINE

## 2020-05-22 PROCEDURE — 36415 COLL VENOUS BLD VENIPUNCTURE: CPT | Performed by: INTERNAL MEDICINE

## 2020-05-22 RX ADMIN — VANCOMYCIN HYDROCHLORIDE 1500 MG: 5 INJECTION, POWDER, LYOPHILIZED, FOR SOLUTION INTRAVENOUS at 17:05

## 2020-05-22 RX ADMIN — NICOTINE 1 PATCH: 21 PATCH, EXTENDED RELEASE TRANSDERMAL at 15:55

## 2020-05-22 RX ADMIN — Medication 1 MG: at 02:26

## 2020-05-22 RX ADMIN — VANCOMYCIN HYDROCHLORIDE 1500 MG: 5 INJECTION, POWDER, LYOPHILIZED, FOR SOLUTION INTRAVENOUS at 08:53

## 2020-05-22 RX ADMIN — BUPRENORPHINE HYDROCHLORIDE, NALOXONE HYDROCHLORIDE 1 FILM: 4; 1 FILM, SOLUBLE BUCCAL; SUBLINGUAL at 22:33

## 2020-05-22 RX ADMIN — DIPHENHYDRAMINE HYDROCHLORIDE 25 MG: 25 CAPSULE ORAL at 02:26

## 2020-05-22 RX ADMIN — BUPRENORPHINE HYDROCHLORIDE, NALOXONE HYDROCHLORIDE 1 FILM: 4; 1 FILM, SOLUBLE BUCCAL; SUBLINGUAL at 15:55

## 2020-05-22 RX ADMIN — DIPHENHYDRAMINE HYDROCHLORIDE 25 MG: 25 CAPSULE ORAL at 17:05

## 2020-05-22 RX ADMIN — CLONIDINE HYDROCHLORIDE 0.1 MG: 0.1 TABLET ORAL at 22:34

## 2020-05-22 RX ADMIN — ACETAMINOPHEN 650 MG: 325 TABLET, FILM COATED ORAL at 02:26

## 2020-05-22 RX ADMIN — VANCOMYCIN HYDROCHLORIDE 1500 MG: 5 INJECTION, POWDER, LYOPHILIZED, FOR SOLUTION INTRAVENOUS at 00:54

## 2020-05-22 ASSESSMENT — ACTIVITIES OF DAILY LIVING (ADL)
ADLS_ACUITY_SCORE: 10

## 2020-05-22 NOTE — PROGRESS NOTES
[facility]  Hospitalist Progress Note  Rene Mccray MD  05/22/2020    Assessment & Plan   Jcarlos Maravilla is a 24 year old male with a past medical history including IV heroin, who was initially hospitalization at [facility] 5/3-5/4 with febrile illness associated with IV heroin usage. However, after discharge, blood cultures became positive for MSSA and pt was subsequently directly admitted to [facility] 5/5/2020 for further workup including SKYE.     MSSA bacteremia, suspect related to IV drug use.  * Initially presented to [facility] evening 5/3/2020 following encouragement from police officers to seek evaluation following use of heroin with his friend, who overdosed. On initial evaluation there, was febrile, with signs of sepsis with elevated lactic acid, procalcitonin 1.22. Subsequently admitted and treated with broad antibiotics. COVID-19 PCR 5/4 negative. TTE 5/4 negative for vegetation. Sepsis resolved and pt was discharged 5/4 on Augmentin (for thrombophlebitis). However, on 5/5, blood cultures from 5/3 came back positive for MSSA and pt sent to [facility] ED for repeat BC's and subsequently directly admitted to [facility] 5/5.   * Started on cefazolin on admit 5/5. SKYE 5/6 negative for vegetations. ID consulted. BC's from 5/5, 5/6 and 05/07 NGTD.  - Continue cefazolin (started 5/5) - plan 4 weeks of IV antibiotics (stop 6/2/2020); will likely remain hospitalized here to complete the course unless accepting TCU can be found (unlikely).  -Cefazolin was stopped and vancomycin was started due to the rash and is planning day 14/28 of antibiotic  - Midline placed on 05/08 but kinked later on so removed  - currently had an PIV  - Monitor cultures.  Negative so far     Superficial occlusive thrombophlebitis of right median cubital vein.  * Diagnosed by US at [facility] 5/4. Was started on Eliquis at [facility] 5/4 and discharged on Augmentin. Did not fill scripts yet after discharge.  *  Thrombophlebitis improved despite not continuing Eliquis after recent discharge. Improved after admission off anticoagulation.  - Continue off anticoagulation.  - Monitor site of thrombophlebitis, if worsens, could consider restarting AC.  - On antibiotics as above.     IVDU - heroin.  * Patient reported using IV heroin use daily. He started using heroin 2 years ago and prefers IV injections. He admits to using around 1-2 gm daily. He had an accidental overdose 3/2020 requiring 12 mg of narcan. Patients father was a known drug user and  of an overdose. He reportedly was working on getting enrolled in [employer] in [place] but had been waiting due to lack of insurance.   * On admit, patient expressed interest in Suboxone. Seen by Psychiatry consulted  and started on Suboxone.  - Psych reconsulted on - currently on Subaxone 1 film TID  - Clonidine changed from prn to 0.1 mg po at bedtime   - plan continue at discharge, consult Psychiatry to order for discharge, appreciate help..  - Continue PRN loperamide.  - Follow-up CD treatment after discharge.     Anemia, suspect chronic disease.  Pt anemic 10-11 range noted during recent hospitalization. No obvious signs of blood loss. Hgb 11.0 on admit . 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   - Monitor CBC.  - Consider prbc transfusion if hgb </= 7.0 or if significant bleeding with hemodynamic instability or if symptomatic.  -His peripheral smear is suggesting a possible hemoglobinopathy, hgb is improving, would reassess after infection is treated.     Nicotine dependence  Patient reported smoking 1.5 PPD. Started on nicotine patch.  - Continue nicotine 21 mg patch.     Drug rash  - noted to have a rash on his forearms bilateral and left sided chest, the rash is itchy  - suspected from IV abx  - cortisone cream TID prn, Benadryl po prn  - abx change to vancomycin  - rash continues to fade     Diet:  Regular Diet Adult    Prophylaxis: PCD's, ambulation.   Cook Catheter: not present  Code Status: Full Code          Disposition Plan     Expected discharge: after 6/2/2020, recommended to prior living arrangement after completion of IV antibiotics (home not an option given IV drug use; TCU's unlikely to accept pt given IV drug use).      Interval History   -- no complaints      -Data reviewed today: I reviewed all new labs and imaging over the last 24 hours. I personally reviewed no images or EKG's today.    Physical Exam   Heart Rate: 55, Blood pressure 104/60, pulse 85, temperature 97.8  F (36.6  C), temperature source Oral, resp. rate 17, weight 101.2 kg (223 lb), SpO2 97 %.  Vitals:    05/18/20 0600 05/20/20 0634 05/21/20 0546   Weight: 100.7 kg (222 lb) 101.2 kg (223 lb 3.2 oz) 101.2 kg (223 lb)     Vital Signs with Ranges  Temp:  [97.8  F (36.6  C)-98.6  F (37  C)] 97.8  F (36.6  C)  Pulse:  [84-85] 85  Heart Rate:  [55] 55  Resp:  [16-17] 17  BP: (104-117)/(60-77) 104/60  SpO2:  [97 %] 97 %  I/O's Last 24 hours  I/O last 3 completed shifts:  In: 500 [P.O.:500]  Out: -     Constitutional: Awake, alert, cooperative, no apparent distress  Respiratory: Clear to auscultation bilaterally, no crackles or wheezing  Cardiovascular: Regular rate and rhythm, normal S1 and S2, and no murmur noted  GI: Normal bowel sounds, soft, non-distended, non-tender  Skin/Integumen: + rash that is fading, no cyanosis, no edema  Other:      Medications   All medications were reviewed.      buprenorphine HCl-naloxone HCl  1 Film Sublingual TID     cloNIDine  0.1 mg Oral At Bedtime     nicotine  1 patch Transdermal Daily     nicotine   Transdermal Q8H     sodium chloride (PF)  3 mL Intracatheter Q8H     vancomycin (VANCOCIN) IV  1,500 mg Intravenous Q8H        Data   Recent Labs   Lab 05/21/20  0633 05/20/20  0627 05/19/20  0651  05/17/20  0616   WBC  --   --   --   --  6.2   HGB  --   --   --   --  12.4*   MCV  --   --   --   --  57*    PLT  --   --   --   --  306   NA  --   --   --   --  136   POTASSIUM  --   --   --   --  4.4   CHLORIDE  --   --   --   --  104   CO2  --   --   --   --  27   BUN  --   --   --   --  8   CR 0.67 0.67 0.66   < > 0.77   ANIONGAP  --   --   --   --  5   MAGALI  --   --   --   --  9.4   GLC  --   --   --   --  86    < > = values in this interval not displayed.       No results found for this or any previous visit (from the past 24 hour(s)).    Rene Mccray MD  Text Page  (7am to 6pm)

## 2020-05-22 NOTE — PROGRESS NOTES
[facility]    Infectious Disease Progress Note    Date of Service (when I saw the patient): 05/14/2020     Assessment & Plan   Jcarlos Maravilla is a 24 year old male who was admitted on 5/5/2020.     Impression:  1. 24 y.o male with IV heroin use.   2. Admitted with right antecubital superficial thrombophlebitis.   3. Bacteremic with MSSA. Only 1/4 cultures.   4. On ancef.   5. TTE negative.      Recommendations:   1. Noted rash on the abscesses and flank along with left UE, could be ancef, stoped and started vanco instead , rash noted to have improved.   2. TTE and SKYE negative for endocarditis.   3. Given MSSA bacteremia anticipate 4 weeks total of IV antibiotics  day 17/28 today.     Multiple dispositon issues      Bekah Ackerman MD    Interval History    Rash on the torso and UE much improved   Repeat cultures are negative so far   No new complaints     Physical Exam   Temp: 97.8  F (36.6  C) Temp src: Oral BP: 104/60 Pulse: 85 Heart Rate: 55 Resp: 17 SpO2: 97 % O2 Device: None (Room air)    Vitals:    05/18/20 0600 05/20/20 0634 05/21/20 0546   Weight: 100.7 kg (222 lb) 101.2 kg (223 lb 3.2 oz) 101.2 kg (223 lb)     Vital Signs with Ranges  Temp:  [97.8  F (36.6  C)-98.6  F (37  C)] 97.8  F (36.6  C)  Pulse:  [84-85] 85  Heart Rate:  [55] 55  Resp:  [16-17] 17  BP: (104-117)/(60-77) 104/60  SpO2:  [97 %] 97 %    Constitutional: Awake, alert, cooperative, no apparent distress  Lungs: Clear to auscultation bilaterally, no crackles or wheezing  Cardiovascular: Regular rate and rhythm, normal S1 and S2, and no murmur noted  Abdomen: Normal bowel sounds, soft, non-distended, non-tender  Skin: Rash on UE and torso   Other:    Medications       buprenorphine HCl-naloxone HCl  1 Film Sublingual TID     cloNIDine  0.1 mg Oral At Bedtime     nicotine  1 patch Transdermal Daily     nicotine   Transdermal Q8H     sodium chloride (PF)  3 mL Intracatheter Q8H     vancomycin (VANCOCIN) IV  1,500 mg Intravenous  Q8H       Data   All microbiology laboratory data reviewed.  Recent Labs   Lab Test 05/17/20  0616 05/14/20  0603 05/07/20  0610   WBC 6.2 5.1 5.5   HGB 12.4* 12.2* 11.0*   HCT 40.4 39.4* 33.8*   MCV 57* 57* 53*    344 248     Recent Labs   Lab Test 05/21/20  0633 05/20/20  0627 05/19/20  0651   CR 0.67 0.67 0.66     No lab results found.  Recent Labs   Lab Test 05/06/20  2335 05/06/20  1129 05/05/20  1106 05/05/20  1043 05/04/20  0138 05/04/20  0046   CULT No growth No growth No growth No growth No growth Cultured on the 1st day of incubation:  Staphylococcus aureus  *  Critical Value/Significant Value, preliminary result only, called to and read back by  Dr Mims, On call hospitalist, 5/5/20 @ 0236 TF    (Note)  POSITIVE for STAPHYLOCOCCUS AUREUS and NEGATIVE for the mecA gene  (not MRSA) by Verigene multiplex nucleic acid test. The mecA gene was  not detected. Final identification and antimicrobial susceptibility  testing will be verified by standard methods.    Specimen tested with Verigene multiplex, gram-positive blood culture  nucleic acid test for the following targets: Staph aureus, Staph  epidermidis, Staph lugdunensis, other Staph species, Enterococcus  faecalis, Enterococcus faecium, Streptococcus species, S. agalactiae,  S. anginosus grp., S. pneumoniae, S. pyogenes, Listeria sp., mecA  (methicillin resistance) and Rico/B (vancomycin resistance).    Critical Value/Significant Value called to and read back by DR YURI MIMS ON CALL HOSPITALIST [facility] 0603 05.05.20 CF         Attestation:  Total time on the floor involved in the patient's care: 35 minutes. Total time spent in counseling/care coordination: >50%

## 2020-05-22 NOTE — PLAN OF CARE
Alert and oriented x 4. Up independently. Denies pain. Benadryl for upper extremities and anterior torso rash.

## 2020-05-22 NOTE — PROGRESS NOTES
BRIEF NUTRITION REASSESSMENT      CURRENT DIET AND INTAKE:  Diet:  Regular  Per flow sheets, he was eating mostly 100% but there has been documentation since 5/17.            Attempted to reach pt by phone but there as no answer.    ANTHROPOMETRICS:  Vitals:    05/06/20 0500 05/07/20 0630 05/08/20 0628 05/11/20 0500   Weight: 96.4 kg (212 lb 9.6 oz) 96.3 kg (212 lb 3.2 oz) 96.3 kg (212 lb 6.4 oz) 98.9 kg (218 lb)    05/12/20 0529 05/13/20 0635 05/14/20 0500 05/17/20 0626   Weight: 98 kg (216 lb) 98.1 kg (216 lb 4.8 oz) 98 kg (216 lb) 100.2 kg (220 lb 12.8 oz)    05/18/20 0600 05/20/20 0634 05/21/20 0546   Weight: 100.7 kg (222 lb) 101.2 kg (223 lb 3.2 oz) 101.2 kg (223 lb)         LABS:  Labs noted      NUTRITION INTERVENTION:  Nutrition Diagnosis:  No nutrition diagnosis at this time.    Implementation:  Nutrition Education:  Per Provider order if indicated      FOLLOW UP/MONITORING:   Will re-evaluate in 7 - 10 days, or sooner, if re-consulted.

## 2020-05-22 NOTE — PLAN OF CARE
A&OX4.  Up independently.  Denies pain.  IV Vanco.  Refused nicotiene patch and suboxone this morning.  Rash on left side of abdomen/chest area and left arm, per patient the rash looks a lot better.

## 2020-05-23 LAB — VANCOMYCIN SERPL-MCNC: 14.9 MG/L

## 2020-05-23 PROCEDURE — 12000000 ZZH R&B MED SURG/OB

## 2020-05-23 PROCEDURE — 25000132 ZZH RX MED GY IP 250 OP 250 PS 637: Performed by: INTERNAL MEDICINE

## 2020-05-23 PROCEDURE — 25000132 ZZH RX MED GY IP 250 OP 250 PS 637: Performed by: PSYCHIATRY & NEUROLOGY

## 2020-05-23 PROCEDURE — 99232 SBSQ HOSP IP/OBS MODERATE 35: CPT | Performed by: INTERNAL MEDICINE

## 2020-05-23 PROCEDURE — 25800030 ZZH RX IP 258 OP 636: Performed by: INTERNAL MEDICINE

## 2020-05-23 PROCEDURE — 80202 ASSAY OF VANCOMYCIN: CPT | Performed by: INTERNAL MEDICINE

## 2020-05-23 PROCEDURE — 36415 COLL VENOUS BLD VENIPUNCTURE: CPT | Performed by: INTERNAL MEDICINE

## 2020-05-23 PROCEDURE — 25000128 H RX IP 250 OP 636: Performed by: INTERNAL MEDICINE

## 2020-05-23 PROCEDURE — 25000132 ZZH RX MED GY IP 250 OP 250 PS 637: Performed by: NURSE PRACTITIONER

## 2020-05-23 RX ADMIN — ACETAMINOPHEN 650 MG: 325 TABLET, FILM COATED ORAL at 21:37

## 2020-05-23 RX ADMIN — BUPRENORPHINE HYDROCHLORIDE, NALOXONE HYDROCHLORIDE 1 FILM: 4; 1 FILM, SOLUBLE BUCCAL; SUBLINGUAL at 21:38

## 2020-05-23 RX ADMIN — DIPHENHYDRAMINE HYDROCHLORIDE 25 MG: 25 CAPSULE ORAL at 08:33

## 2020-05-23 RX ADMIN — VANCOMYCIN HYDROCHLORIDE 1500 MG: 5 INJECTION, POWDER, LYOPHILIZED, FOR SOLUTION INTRAVENOUS at 18:54

## 2020-05-23 RX ADMIN — CLONIDINE HYDROCHLORIDE 0.1 MG: 0.1 TABLET ORAL at 21:38

## 2020-05-23 RX ADMIN — NICOTINE 1 PATCH: 21 PATCH, EXTENDED RELEASE TRANSDERMAL at 12:42

## 2020-05-23 RX ADMIN — ACETAMINOPHEN 650 MG: 325 TABLET, FILM COATED ORAL at 12:43

## 2020-05-23 RX ADMIN — BUPRENORPHINE HYDROCHLORIDE, NALOXONE HYDROCHLORIDE 1 FILM: 4; 1 FILM, SOLUBLE BUCCAL; SUBLINGUAL at 18:18

## 2020-05-23 RX ADMIN — VANCOMYCIN HYDROCHLORIDE 1500 MG: 5 INJECTION, POWDER, LYOPHILIZED, FOR SOLUTION INTRAVENOUS at 08:26

## 2020-05-23 RX ADMIN — Medication 1 MG: at 00:50

## 2020-05-23 RX ADMIN — VANCOMYCIN HYDROCHLORIDE 1500 MG: 5 INJECTION, POWDER, LYOPHILIZED, FOR SOLUTION INTRAVENOUS at 00:43

## 2020-05-23 RX ADMIN — BUPRENORPHINE HYDROCHLORIDE, NALOXONE HYDROCHLORIDE 1 FILM: 4; 1 FILM, SOLUBLE BUCCAL; SUBLINGUAL at 08:26

## 2020-05-23 RX ADMIN — DIPHENHYDRAMINE HYDROCHLORIDE 25 MG: 25 CAPSULE ORAL at 00:50

## 2020-05-23 ASSESSMENT — ACTIVITIES OF DAILY LIVING (ADL)
ADLS_ACUITY_SCORE: 10

## 2020-05-23 NOTE — PHARMACY-VANCOMYCIN DOSING SERVICE
Pharmacy Vancomycin Note  Date of Service May 23, 2020  Patient's  1995   24 year old, male    Indication: Bacteremia - MSSA w/SKYE negative for vegetation   Goal Trough Level: 10-15 mg/L  Day of Therapy: 10  Current Vancomycin regimen:  1500 mg IV q8h    Current estimated CrCl = Estimated Creatinine Clearance: 212.8 mL/min (based on SCr of 0.67 mg/dL).    Creatinine for last 3 days  2020:  6:33 AM Creatinine 0.67 mg/dL    Recent Vancomycin Levels (past 3 days)  2020:  2:47 PM Vancomycin Level 15.0 mg/L  2020:  6:51 AM Vancomycin Level 14.9 mg/L    Vancomycin IV Administrations (past 72 hours)                   vancomycin 1500 mg in 0.9% NaCl 250 ml intermittent infusion 1,500 mg (mg) 1,500 mg Given 20 0043     1,500 mg Given 20 1705     1,500 mg Given  0853     1,500 mg Given  0054     1,500 mg Given 20 1524     1,500 mg Given  0853     1,500 mg Given 20 2317     1,500 mg Given  1643     1,500 mg Given  0815                Nephrotoxins and other renal medications (From now, onward)    Start     Dose/Rate Route Frequency Ordered Stop    20 0600  vancomycin 1500 mg in 0.9% NaCl 250 ml intermittent infusion 1,500 mg      1,500 mg  over 90 Minutes Intravenous EVERY 8 HOURS 20 0030               Contrast Orders - past 72 hours (72h ago, onward)    None          Interpretation of levels and current regimen:  Trough level is  Therapeutic    Has serum creatinine changed > 50% in last 72 hours: No    Urine output:  good urine output    Renal Function: Stable    Plan:  1.  Continue Current Dose  2.  Pharmacy will check trough levels as appropriate in 1-3 Days.    3. Serum creatinine levels will be ordered daily for the first week of therapy and at least twice weekly for subsequent weeks.      Baldemar Maurer RPH        .

## 2020-05-23 NOTE — PROGRESS NOTES
[facility]  Hospitalist Progress Note  Rene Mccray MD  05/23/2020    Assessment & Plan   Jcarlos Maravilla is a 24 year old male with a past medical history including IV heroin, who was initially hospitalization at [facility] 5/3-5/4 with febrile illness associated with IV heroin usage. However, after discharge, blood cultures became positive for MSSA and pt was subsequently directly admitted to [facility] 5/5/2020 for further workup including SKYE.     MSSA bacteremia, suspect related to IV drug use.  * Initially presented to [facility] evening 5/3/2020 following encouragement from police officers to seek evaluation following use of heroin with his friend, who overdosed. On initial evaluation there, was febrile, with signs of sepsis with elevated lactic acid, procalcitonin 1.22. Subsequently admitted and treated with broad antibiotics. COVID-19 PCR 5/4 negative. TTE 5/4 negative for vegetation. Sepsis resolved and pt was discharged 5/4 on Augmentin (for thrombophlebitis). However, on 5/5, blood cultures from 5/3 came back positive for MSSA and pt sent to [facility] ED for repeat BC's and subsequently directly admitted to [facility] 5/5.   * Started on cefazolin on admit 5/5. SKYE 5/6 negative for vegetations. ID consulted. BC's from 5/5, 5/6 and 05/07 NGTD.  - Continue cefazolin (started 5/5) - plan 4 weeks of IV antibiotics (stop 6/2/2020); will likely remain hospitalized here to complete the course unless accepting TCU can be found (unlikely).  -Cefazolin was stopped and vancomycin was started due to the rash and is planning day 14/28 of antibiotic  - has peripheral line in place  - Monitor cultures.  Negative so far     Superficial occlusive thrombophlebitis of right median cubital vein.  * Diagnosed by US at [facility] 5/4. Was started on Eliquis at [facility] 5/4 and discharged on Augmentin. Did not fill scripts yet after discharge.  * Thrombophlebitis improved despite not continuing Eliquis  after recent discharge. Improved after admission off anticoagulation.  - Continue off anticoagulation.  - Monitor site of thrombophlebitis, if worsens, could consider restarting AC.  - On antibiotics as above.     IVDU - heroin.  * Patient reported using IV heroin use daily. He started using heroin 2 years ago and prefers IV injections. He admits to using around 1-2 gm daily. He had an accidental overdose 3/2020 requiring 12 mg of narcan. Patients father was a known drug user and  of an overdose. He reportedly was working on getting enrolled in [employer] [employer] in [place] but had been waiting due to lack of insurance.   * On admit, patient expressed interest in Suboxone. Seen by Psychiatry consulted  and started on Suboxone.  - Psych reconsulted on - currently on Subaxone 1 film TID  - Clonidine changed from prn to 0.1 mg po at bedtime   - plan continue at discharge, consult Psychiatry to order for discharge, appreciate help..  - Continue PRN loperamide.  - Follow-up CD treatment after discharge.     Anemia, suspect chronic disease.  Pt anemic 10-11 range noted during recent hospitalization. No obvious signs of blood loss. Hgb 11.0 on admit . 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   - Monitor CBC.  - Consider prbc transfusion if hgb </= 7.0 or if significant bleeding with hemodynamic instability or if symptomatic.  -His peripheral smear is suggesting a possible hemoglobinopathy, hgb is improving, would reassess after infection is treated.     Nicotine dependence  Patient reported smoking 1.5 PPD. Started on nicotine patch.  - Continue nicotine 21 mg patch.     Drug rash  - noted to have a rash on his forearms bilateral and left sided chest, the rash is itchy  - suspected from IV abx  - cortisone cream TID prn, Benadryl po prn  - abx change to vancomycin  - rash continues to fade     Diet: Regular Diet Adult    Prophylaxis: PCD's, ambulation.    Cook Catheter: not present  Code Status: Full Code          Disposition Plan     Expected discharge: after 6/2/2020, recommended to prior living arrangement after completion of IV antibiotics (home not an option given IV drug use; TCU's unlikely to accept pt given IV drug use).      Interval History   -- no complaints  -- no diarrhea      -Data reviewed today: I reviewed all new labs and imaging over the last 24 hours. I personally reviewed no images or EKG's today.    Physical Exam   Heart Rate: 70, Blood pressure 113/70, pulse 83, temperature 98.1  F (36.7  C), temperature source Oral, resp. rate 16, weight 101.2 kg (223 lb), SpO2 99 %.  Vitals:    05/18/20 0600 05/20/20 0634 05/21/20 0546   Weight: 100.7 kg (222 lb) 101.2 kg (223 lb 3.2 oz) 101.2 kg (223 lb)     Vital Signs with Ranges  Temp:  [98.1  F (36.7  C)-98.2  F (36.8  C)] 98.1  F (36.7  C)  Pulse:  [83] 83  Heart Rate:  [70-83] 70  Resp:  [16] 16  BP: (113-123)/(64-70) 113/70  SpO2:  [96 %-100 %] 99 %  I/O's Last 24 hours  I/O last 3 completed shifts:  In: 740 [P.O.:740]  Out: -     Constitutional: Awake, alert, cooperative, no apparent distress  Respiratory: Clear to auscultation bilaterally, no crackles or wheezing  Cardiovascular: Regular rate and rhythm, normal S1 and S2, and no murmur noted  GI: Normal bowel sounds, soft, non-distended, non-tender  Skin/Integumen: + rash that is fading, no cyanosis, no edema  Other:      Medications   All medications were reviewed.      buprenorphine HCl-naloxone HCl  1 Film Sublingual TID     cloNIDine  0.1 mg Oral At Bedtime     nicotine  1 patch Transdermal Daily     nicotine   Transdermal Q8H     sodium chloride (PF)  3 mL Intracatheter Q8H     vancomycin (VANCOCIN) IV  1,500 mg Intravenous Q8H        Data   Recent Labs   Lab 05/21/20  0633 05/20/20  0627 05/19/20  0651  05/17/20  0616   WBC  --   --   --   --  6.2   HGB  --   --   --   --  12.4*   MCV  --   --   --   --  57*   PLT  --   --   --   --  306    NA  --   --   --   --  136   POTASSIUM  --   --   --   --  4.4   CHLORIDE  --   --   --   --  104   CO2  --   --   --   --  27   BUN  --   --   --   --  8   CR 0.67 0.67 0.66   < > 0.77   ANIONGAP  --   --   --   --  5   MAGALI  --   --   --   --  9.4   GLC  --   --   --   --  86    < > = values in this interval not displayed.       No results found for this or any previous visit (from the past 24 hour(s)).    Rene Mccray MD  Text Page  (7am to 6pm)

## 2020-05-23 NOTE — PLAN OF CARE
Pt A&Ox4, VSS on RA. Pt up independently in room, voiding in bathroom. IV saline locked between doses of IV vancomycin. Rash to L upper chest and R abdomen improving per pt, PRN benadryl given x1 for itching. Will continue to follow plan of care.

## 2020-05-24 PROCEDURE — 25000128 H RX IP 250 OP 636: Performed by: INTERNAL MEDICINE

## 2020-05-24 PROCEDURE — 99232 SBSQ HOSP IP/OBS MODERATE 35: CPT | Performed by: STUDENT IN AN ORGANIZED HEALTH CARE EDUCATION/TRAINING PROGRAM

## 2020-05-24 PROCEDURE — 25000132 ZZH RX MED GY IP 250 OP 250 PS 637: Performed by: PSYCHIATRY & NEUROLOGY

## 2020-05-24 PROCEDURE — 25000132 ZZH RX MED GY IP 250 OP 250 PS 637: Performed by: INTERNAL MEDICINE

## 2020-05-24 PROCEDURE — 12000000 ZZH R&B MED SURG/OB

## 2020-05-24 PROCEDURE — 25000132 ZZH RX MED GY IP 250 OP 250 PS 637: Performed by: NURSE PRACTITIONER

## 2020-05-24 PROCEDURE — 25800030 ZZH RX IP 258 OP 636: Performed by: INTERNAL MEDICINE

## 2020-05-24 RX ADMIN — VANCOMYCIN HYDROCHLORIDE 1500 MG: 5 INJECTION, POWDER, LYOPHILIZED, FOR SOLUTION INTRAVENOUS at 08:16

## 2020-05-24 RX ADMIN — ACETAMINOPHEN 650 MG: 325 TABLET, FILM COATED ORAL at 16:24

## 2020-05-24 RX ADMIN — DIPHENHYDRAMINE HYDROCHLORIDE 25 MG: 25 CAPSULE ORAL at 16:24

## 2020-05-24 RX ADMIN — VANCOMYCIN HYDROCHLORIDE 1500 MG: 5 INJECTION, POWDER, LYOPHILIZED, FOR SOLUTION INTRAVENOUS at 00:33

## 2020-05-24 RX ADMIN — Medication 1 MG: at 02:19

## 2020-05-24 RX ADMIN — VANCOMYCIN HYDROCHLORIDE 1500 MG: 5 INJECTION, POWDER, LYOPHILIZED, FOR SOLUTION INTRAVENOUS at 23:58

## 2020-05-24 RX ADMIN — DIPHENHYDRAMINE HYDROCHLORIDE 25 MG: 25 CAPSULE ORAL at 02:19

## 2020-05-24 RX ADMIN — NICOTINE 1 PATCH: 21 PATCH, EXTENDED RELEASE TRANSDERMAL at 08:13

## 2020-05-24 RX ADMIN — CLONIDINE HYDROCHLORIDE 0.1 MG: 0.1 TABLET ORAL at 22:46

## 2020-05-24 RX ADMIN — BUPRENORPHINE HYDROCHLORIDE, NALOXONE HYDROCHLORIDE 1 FILM: 4; 1 FILM, SOLUBLE BUCCAL; SUBLINGUAL at 22:46

## 2020-05-24 RX ADMIN — VANCOMYCIN HYDROCHLORIDE 1500 MG: 5 INJECTION, POWDER, LYOPHILIZED, FOR SOLUTION INTRAVENOUS at 16:24

## 2020-05-24 RX ADMIN — BUPRENORPHINE HYDROCHLORIDE, NALOXONE HYDROCHLORIDE 1 FILM: 4; 1 FILM, SOLUBLE BUCCAL; SUBLINGUAL at 08:13

## 2020-05-24 ASSESSMENT — ACTIVITIES OF DAILY LIVING (ADL)
ADLS_ACUITY_SCORE: 10

## 2020-05-24 NOTE — PLAN OF CARE
Pt A/O, VSS on RA. Independent. C/o tooth pain, tylenol given. IV changed d/t site getting wet and pain with flushing. Note left for MDs to consider midline or PICC. Call light within reach, non slip socks on when OOB & bed in lowest/locked position. Will continue to monitor.

## 2020-05-24 NOTE — PROGRESS NOTES
[facility]  Hospitalist Progress Note  José Manuel Augustin MD  05/24/2020    Assessment & Plan      Jcarlos Maravilla is a 24 year old male with a past medical history including IV heroin, who was initially hospitalization at [facility] 5/3-5/4 with febrile illness associated with IV heroin usage. However, after discharge, blood cultures became positive for MSSA and pt was subsequently directly admitted to [facility] 5/5/2020 for further workup including SKYE.     MSSA bacteremia, suspect related to IV drug use.  * Initially presented to [facility] evening 5/3/2020 following encouragement from police officers to seek evaluation following use of heroin with his friend, who overdosed. On initial evaluation there, was febrile, with signs of sepsis with elevated lactic acid, procalcitonin 1.22. Subsequently admitted and treated with broad antibiotics. COVID-19 PCR 5/4 negative. TTE 5/4 negative for vegetation. Sepsis resolved and pt was discharged 5/4 on Augmentin (for thrombophlebitis). However, on 5/5, blood cultures from 5/3 came back positive for MSSA and pt sent to [facility] ED for repeat BC's and subsequently directly admitted to [facility] 5/5.   * Started on cefazolin on admit 5/5. SKYE 5/6 negative for vegetations. ID consulted. BC's from 5/5, 5/6 and 05/07 NGTD.  - Continue cefazolin (started 5/5) - plan 4 weeks of IV antibiotics (stop 6/2/2020); will likely remain hospitalized here to complete the course unless accepting TCU can be found (unlikely).  -Cefazolin was stopped and vancomycin was started due to the rash and is planning day 14/28 of antibiotic  - has peripheral line in place -> will attempt midline given needing frequent PIVs  - Monitor cultures.  Negative so far     Superficial occlusive thrombophlebitis of right median cubital vein.  * Diagnosed by US at [facility] 5/4. Was started on Eliquis at [facility] 5/4 and discharged on Augmentin. Did not fill scripts yet after discharge.  *  Thrombophlebitis improved despite not continuing Eliquis after recent discharge. Improved after admission off anticoagulation.  - Continue off anticoagulation.  - Monitor site of thrombophlebitis, if worsens, could consider restarting AC.  - On antibiotics as above.     IVDU - heroin.  * Patient reported using IV heroin use daily. He started using heroin 2 years ago and prefers IV injections. He admits to using around 1-2 gm daily. He had an accidental overdose 3/2020 requiring 12 mg of narcan. Patients father was a known drug user and  of an overdose. He reportedly was working on getting enrolled in [email or web address] in [place] but had been waiting due to lack of insurance.   * On admit, patient expressed interest in Suboxone. Seen by Psychiatry consulted  and started on Suboxone.  - Psych reconsulted on - currently on Subaxone 1 film TID  - Clonidine changed from prn to 0.1 mg po at bedtime   - plan continue at discharge, consult Psychiatry to order for discharge, appreciate help..  - Continue PRN loperamide.  - Follow-up CD treatment after discharge.     Anemia, suspect chronic disease.  Pt anemic 10-11 range noted during recent hospitalization. No obvious signs of blood loss. Hgb 11.0 on admit . 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   - Monitor CBC.  - Consider prbc transfusion if hgb </= 7.0 or if significant bleeding with hemodynamic instability or if symptomatic.  -His peripheral smear is suggesting a possible hemoglobinopathy, hgb is improving, would reassess after infection is treated.     Nicotine dependence  Patient reported smoking 1.5 PPD. Started on nicotine patch.  - Continue nicotine 21 mg patch.     Drug rash  - noted to have a rash on his forearms bilateral and left sided chest, the rash is itchy  - suspected from IV abx  - cortisone cream TID prn, Benadryl po prn  - abx change to vancomycin  - rash continues to fade     Diet:  Regular Diet Adult    Prophylaxis: PCD's, ambulation.   Cook Catheter: not present  Code Status: Full Code       Disposition Plan     Expected discharge: after 6/2/2020, recommended to prior living arrangement after completion of IV antibiotics (home not an option given IV drug use; TCU's unlikely to accept pt given IV drug use).    Interval History      -- no acute events overnight  -- No new CP/SOB. No fevers or chills  -- No new complaints  -- no diarrhea, no abdominal pain. No nausea/vomiting.     -Data reviewed today: I reviewed all new labs and imaging over the last 24 hours. I personally reviewed no images or EKG's today.    Physical Exam   Heart Rate: 79, Blood pressure 106/62, pulse 76, temperature 98.3  F (36.8  C), temperature source Oral, resp. rate 16, weight 101.2 kg (223 lb), SpO2 96 %.  Vitals:    05/18/20 0600 05/20/20 0634 05/21/20 0546   Weight: 100.7 kg (222 lb) 101.2 kg (223 lb 3.2 oz) 101.2 kg (223 lb)     Vital Signs with Ranges  Temp:  [97.5  F (36.4  C)-98.4  F (36.9  C)] 98.3  F (36.8  C)  Heart Rate:  [67-79] 79  Resp:  [16] 16  BP: (106-128)/(62-78) 106/62  SpO2:  [95 %-96 %] 96 %  I/O's Last 24 hours  I/O last 3 completed shifts:  In: 240 [P.O.:240]  Out: -     Constitutional: Awake, alert, cooperative, no apparent distress  Respiratory: Clear to auscultation bilaterally, no crackles or wheezing  Cardiovascular: Regular rate and rhythm, normal S1 and S2, and no murmur noted  GI: Normal bowel sounds, soft, non-distended, non-tender  Skin/Integumen: + rash that is fading, no cyanosis, no edema  Other: Normal mood and affect    Medications   All medications were reviewed.      buprenorphine HCl-naloxone HCl  1 Film Sublingual TID     cloNIDine  0.1 mg Oral At Bedtime     nicotine  1 patch Transdermal Daily     nicotine   Transdermal Q8H     sodium chloride (PF)  3 mL Intracatheter Q8H     vancomycin (VANCOCIN) IV  1,500 mg Intravenous Q8H        Data   Recent Labs   Lab 05/21/20  5083  05/20/20  0627 05/19/20  0651   CR 0.67 0.67 0.66       No results found for this or any previous visit (from the past 24 hour(s)).    José Manuel Augustin MD  Text Page  (7am to 6pm)

## 2020-05-24 NOTE — PLAN OF CARE
VSS on RA. C/O toothache, tylenol x 1 given. Lt abd, chest , and arm rash present (getting better as per pt). On IV antibiotic. Will continue to monitor.

## 2020-05-25 PROCEDURE — 25000132 ZZH RX MED GY IP 250 OP 250 PS 637: Performed by: INTERNAL MEDICINE

## 2020-05-25 PROCEDURE — 25800030 ZZH RX IP 258 OP 636: Performed by: INTERNAL MEDICINE

## 2020-05-25 PROCEDURE — 25000132 ZZH RX MED GY IP 250 OP 250 PS 637: Performed by: PSYCHIATRY & NEUROLOGY

## 2020-05-25 PROCEDURE — 25000128 H RX IP 250 OP 636: Performed by: INTERNAL MEDICINE

## 2020-05-25 PROCEDURE — 40000141 ZZH STATISTIC PERIPHERAL IV START W/O US GUIDANCE

## 2020-05-25 PROCEDURE — 99207 ZZC CDG-MDM COMPONENT: MEETS MODERATE - UP CODED: CPT | Performed by: STUDENT IN AN ORGANIZED HEALTH CARE EDUCATION/TRAINING PROGRAM

## 2020-05-25 PROCEDURE — 12000000 ZZH R&B MED SURG/OB

## 2020-05-25 PROCEDURE — 99232 SBSQ HOSP IP/OBS MODERATE 35: CPT | Performed by: STUDENT IN AN ORGANIZED HEALTH CARE EDUCATION/TRAINING PROGRAM

## 2020-05-25 PROCEDURE — 25000132 ZZH RX MED GY IP 250 OP 250 PS 637: Performed by: NURSE PRACTITIONER

## 2020-05-25 RX ADMIN — ACETAMINOPHEN 650 MG: 325 TABLET, FILM COATED ORAL at 00:05

## 2020-05-25 RX ADMIN — BUPRENORPHINE HYDROCHLORIDE, NALOXONE HYDROCHLORIDE 1 FILM: 4; 1 FILM, SOLUBLE BUCCAL; SUBLINGUAL at 21:44

## 2020-05-25 RX ADMIN — ACETAMINOPHEN 650 MG: 325 TABLET, FILM COATED ORAL at 16:24

## 2020-05-25 RX ADMIN — VANCOMYCIN HYDROCHLORIDE 1500 MG: 5 INJECTION, POWDER, LYOPHILIZED, FOR SOLUTION INTRAVENOUS at 09:29

## 2020-05-25 RX ADMIN — VANCOMYCIN HYDROCHLORIDE 1500 MG: 5 INJECTION, POWDER, LYOPHILIZED, FOR SOLUTION INTRAVENOUS at 16:24

## 2020-05-25 RX ADMIN — DIPHENHYDRAMINE HYDROCHLORIDE 25 MG: 25 CAPSULE ORAL at 09:34

## 2020-05-25 RX ADMIN — DIPHENHYDRAMINE HYDROCHLORIDE 25 MG: 25 CAPSULE ORAL at 00:07

## 2020-05-25 RX ADMIN — NICOTINE 1 PATCH: 21 PATCH, EXTENDED RELEASE TRANSDERMAL at 09:27

## 2020-05-25 RX ADMIN — ACETAMINOPHEN 650 MG: 325 TABLET, FILM COATED ORAL at 09:34

## 2020-05-25 RX ADMIN — CLONIDINE HYDROCHLORIDE 0.1 MG: 0.1 TABLET ORAL at 21:44

## 2020-05-25 RX ADMIN — BUPRENORPHINE HYDROCHLORIDE, NALOXONE HYDROCHLORIDE 1 FILM: 4; 1 FILM, SOLUBLE BUCCAL; SUBLINGUAL at 16:24

## 2020-05-25 RX ADMIN — DIPHENHYDRAMINE HYDROCHLORIDE 25 MG: 25 CAPSULE ORAL at 16:24

## 2020-05-25 RX ADMIN — Medication 1 MG: at 00:07

## 2020-05-25 RX ADMIN — BUPRENORPHINE HYDROCHLORIDE, NALOXONE HYDROCHLORIDE 1 FILM: 4; 1 FILM, SOLUBLE BUCCAL; SUBLINGUAL at 09:28

## 2020-05-25 ASSESSMENT — ACTIVITIES OF DAILY LIVING (ADL)
ADLS_ACUITY_SCORE: 10

## 2020-05-25 NOTE — PROGRESS NOTES
Midline line ordered on 5-24.  Patient currently on Vanco.  Midlines are not used for Vanco.  Will need to clarify with ID what type of access patient would be appropriate for.  Discussed with unit RN and she will talk with ID today when they round.

## 2020-05-25 NOTE — PROGRESS NOTES
[facility]  Hospitalist Progress Note    José Manuel Augustin MD  05/25/2020    Assessment & Plan      Jcarlos Maravilla is a 24 year old male with a past medical history including IV heroin, who was initially hospitalization at [facility] 5/3-5/4 with febrile illness associated with IV heroin usage. However, after discharge, blood cultures became positive for MSSA and pt was subsequently directly admitted to [facility] 5/5/2020 for further workup including SKYE.     MSSA bacteremia, suspect related to IV drug use.  * Initially presented to [facility] evening 5/3/2020 following encouragement from police officers to seek evaluation following use of heroin with his friend, who overdosed. On initial evaluation there, was febrile, with signs of sepsis with elevated lactic acid, procalcitonin 1.22. Subsequently admitted and treated with broad antibiotics. COVID-19 PCR 5/4 negative. TTE 5/4 negative for vegetation. Sepsis resolved and pt was discharged 5/4 on Augmentin (for thrombophlebitis). However, on 5/5, blood cultures from 5/3 came back positive for MSSA and pt sent to [facility] ED for repeat BC's and subsequently directly admitted to [facility] 5/5.   * Started on cefazolin on admit 5/5. SKYE 5/6 negative for vegetations. ID consulted. BC's from 5/5, 5/6 and 05/07 NGTD.  - Continue cefazolin (started 5/5) - plan 4 weeks of IV antibiotics (stop 6/2/2020); will likely remain hospitalized here to complete the course unless accepting TCU can be found (unlikely).  -Cefazolin was stopped and vancomycin was started due to the rash and is planning day 14/28 of antibiotic  - has peripheral line in place  - Monitor cultures.  Negative so far     Superficial occlusive thrombophlebitis of right median cubital vein.  * Diagnosed by US at [facility] 5/4. Was started on Eliquis at [facility] 5/4 and discharged on Augmentin. Did not fill scripts yet after discharge.  * Thrombophlebitis improved despite not continuing Eliquis after  recent discharge. Improved after admission off anticoagulation.  - Continue off anticoagulation.  - Monitor site of thrombophlebitis, if worsens, could consider restarting AC.  - On antibiotics as above.     IVDU - heroin.  * Patient reported using IV heroin use daily. He started using heroin 2 years ago and prefers IV injections. He admits to using around 1-2 gm daily. He had an accidental overdose 3/2020 requiring 12 mg of narcan. Patients father was a known drug user and  of an overdose. He reportedly was working on getting enrolled in [employer] in [place] but had been waiting due to lack of insurance.   * On admit, patient expressed interest in Suboxone. Seen by Psychiatry consulted  and started on Suboxone.  - Psych reconsulted on - currently on Subaxone 1 film TID  - Clonidine changed from prn to 0.1 mg po at bedtime   - plan continue at discharge, consult Psychiatry to order for discharge, appreciate help..  - Continue PRN loperamide.  - Follow-up CD treatment after discharge.     Anemia, suspect chronic disease.  Pt anemic 10-11 range noted during recent hospitalization. No obvious signs of blood loss. Hgb 11.0 on admit . 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   - Monitor CBC.  - Consider prbc transfusion if hgb </= 7.0 or if significant bleeding with hemodynamic instability or if symptomatic.  -His peripheral smear is suggesting a possible hemoglobinopathy, hgb is improving, would reassess after infection is treated.     Nicotine dependence  Patient reported smoking 1.5 PPD. Started on nicotine patch.  - Continue nicotine 21 mg patch.     Drug rash  - noted to have a rash on his forearms bilateral and left sided chest, the rash is itchy  - suspected from IV abx  - cortisone cream TID prn, Benadryl po prn  - abx change to vancomycin  - rash continues to fade     Diet: Regular Diet Adult    Prophylaxis: PCD's, ambulation.   Joey  Catheter: not present  Code Status: Full Code       Disposition Plan     Expected discharge: after 6/2/2020, recommended to prior living arrangement after completion of IV antibiotics (home not an option given IV drug use; TCU's unlikely to accept pt given IV drug use).    Interval History      -- no acute events overnight  -- Decided he did not want PICC at this time.  -- No new CP/SOB. No fevers or chills  -- No new complaints  -- no diarrhea, no abdominal pain. No nausea/vomiting.     -Data reviewed today: I reviewed all new labs and imaging over the last 24 hours. I personally reviewed no images or EKG's today.    Physical Exam   Heart Rate: 83, Blood pressure 119/72, pulse 81, temperature 97.6  F (36.4  C), temperature source Oral, resp. rate 18, weight 103.4 kg (228 lb), SpO2 96 %.  Vitals:    05/20/20 0634 05/21/20 0546 05/25/20 0649   Weight: 101.2 kg (223 lb 3.2 oz) 101.2 kg (223 lb) 103.4 kg (228 lb)     Vital Signs with Ranges  Temp:  [97.6  F (36.4  C)-98.2  F (36.8  C)] 97.6  F (36.4  C)  Pulse:  [81] 81  Heart Rate:  [64-83] 83  Resp:  [16-18] 18  BP: (109-119)/(66-72) 119/72  SpO2:  [94 %-97 %] 96 %  I/O's Last 24 hours  I/O last 3 completed shifts:  In: 480 [P.O.:480]  Out: -     Constitutional: Awake, alert, cooperative, no apparent distress  Respiratory: Clear to auscultation bilaterally, no crackles or wheezing  Cardiovascular: Regular rate and rhythm, normal S1 and S2, and no murmur noted  GI: Normal bowel sounds, soft, non-distended, non-tender  Skin/Integumen: + rash that is fading, no cyanosis, no edema  Other: Normal mood and affect    Medications   All medications were reviewed.      buprenorphine HCl-naloxone HCl  1 Film Sublingual TID     cloNIDine  0.1 mg Oral At Bedtime     nicotine  1 patch Transdermal Daily     nicotine   Transdermal Q8H     sodium chloride (PF)  10 mL Intracatheter Q8H     sodium chloride (PF)  3 mL Intracatheter Q8H     vancomycin (VANCOCIN) IV  1,500 mg Intravenous  Q8H        Data   Recent Labs   Lab 05/21/20  0633 05/20/20  0627 05/19/20  0651   CR 0.67 0.67 0.66       No results found for this or any previous visit (from the past 24 hour(s)).    José Manuel Augustin MD  Text Page  (7am to 6pm)

## 2020-05-25 NOTE — PROGRESS NOTES
Picc line ordered for IV antbx due to poor peripheral access and IV sites not lasting.  Teaching sheet for picc insertion given to patient and verbal teaching done at the bedside.  Patient requesting some time to think about the picc line procedure.  Nurse to call VAT when patient ready.

## 2020-05-25 NOTE — PLAN OF CARE
VSS. Up independently. Benadryl x 2 for itching rash on trunk and arms. Tooth pain managed with tylenol. A&OX4.

## 2020-05-25 NOTE — PROGRESS NOTES
After patient reviewed picc teaching sheet he declined to have picc line placed at this time.  A new peripheral IV was placed for continued IV antbx infusions.

## 2020-05-25 NOTE — PLAN OF CARE
Patient is A&O x4. Up independent. Denies chest pain or SOB. Complained of itching managed with benadryl x1. Tylenol given for toothache. Voiding adequately. Will continue to monitor.

## 2020-05-25 NOTE — PLAN OF CARE
Pt A/O, VSS on RA. C/o of tooth pain, tylenol given. IVF SL. Call light within reach, non slip socks on when OOB & bed in lowest/locked position. Will continue to monitor.

## 2020-05-26 LAB
ANION GAP SERPL CALCULATED.3IONS-SCNC: 4 MMOL/L (ref 3–14)
BUN SERPL-MCNC: 12 MG/DL (ref 7–30)
CALCIUM SERPL-MCNC: 9.2 MG/DL (ref 8.5–10.1)
CHLORIDE SERPL-SCNC: 103 MMOL/L (ref 94–109)
CO2 SERPL-SCNC: 28 MMOL/L (ref 20–32)
CREAT SERPL-MCNC: 0.68 MG/DL (ref 0.66–1.25)
ERYTHROCYTE [DISTWIDTH] IN BLOOD BY AUTOMATED COUNT: 16.5 % (ref 10–15)
GFR SERPL CREATININE-BSD FRML MDRD: >90 ML/MIN/{1.73_M2}
GLUCOSE SERPL-MCNC: 88 MG/DL (ref 70–99)
HCT VFR BLD AUTO: 40.9 % (ref 40–53)
HGB BLD-MCNC: 12.8 G/DL (ref 13.3–17.7)
MCH RBC QN AUTO: 17.6 PG (ref 26.5–33)
MCHC RBC AUTO-ENTMCNC: 31.3 G/DL (ref 31.5–36.5)
MCV RBC AUTO: 56 FL (ref 78–100)
PLATELET # BLD AUTO: 250 10E9/L (ref 150–450)
POTASSIUM SERPL-SCNC: 4 MMOL/L (ref 3.4–5.3)
RBC # BLD AUTO: 7.29 10E12/L (ref 4.4–5.9)
SODIUM SERPL-SCNC: 135 MMOL/L (ref 133–144)
WBC # BLD AUTO: 6.6 10E9/L (ref 4–11)

## 2020-05-26 PROCEDURE — 80048 BASIC METABOLIC PNL TOTAL CA: CPT | Performed by: STUDENT IN AN ORGANIZED HEALTH CARE EDUCATION/TRAINING PROGRAM

## 2020-05-26 PROCEDURE — 25000132 ZZH RX MED GY IP 250 OP 250 PS 637: Performed by: NURSE PRACTITIONER

## 2020-05-26 PROCEDURE — 12000000 ZZH R&B MED SURG/OB

## 2020-05-26 PROCEDURE — 25000132 ZZH RX MED GY IP 250 OP 250 PS 637: Performed by: INTERNAL MEDICINE

## 2020-05-26 PROCEDURE — 25000132 ZZH RX MED GY IP 250 OP 250 PS 637: Performed by: PSYCHIATRY & NEUROLOGY

## 2020-05-26 PROCEDURE — 25800030 ZZH RX IP 258 OP 636: Performed by: INTERNAL MEDICINE

## 2020-05-26 PROCEDURE — 99232 SBSQ HOSP IP/OBS MODERATE 35: CPT | Performed by: STUDENT IN AN ORGANIZED HEALTH CARE EDUCATION/TRAINING PROGRAM

## 2020-05-26 PROCEDURE — 36415 COLL VENOUS BLD VENIPUNCTURE: CPT | Performed by: STUDENT IN AN ORGANIZED HEALTH CARE EDUCATION/TRAINING PROGRAM

## 2020-05-26 PROCEDURE — 25000128 H RX IP 250 OP 636: Performed by: INTERNAL MEDICINE

## 2020-05-26 PROCEDURE — 85027 COMPLETE CBC AUTOMATED: CPT | Performed by: STUDENT IN AN ORGANIZED HEALTH CARE EDUCATION/TRAINING PROGRAM

## 2020-05-26 RX ADMIN — CLONIDINE HYDROCHLORIDE 0.1 MG: 0.1 TABLET ORAL at 21:00

## 2020-05-26 RX ADMIN — DIPHENHYDRAMINE HYDROCHLORIDE 25 MG: 25 CAPSULE ORAL at 00:50

## 2020-05-26 RX ADMIN — VANCOMYCIN HYDROCHLORIDE 1500 MG: 5 INJECTION, POWDER, LYOPHILIZED, FOR SOLUTION INTRAVENOUS at 08:00

## 2020-05-26 RX ADMIN — VANCOMYCIN HYDROCHLORIDE 1500 MG: 5 INJECTION, POWDER, LYOPHILIZED, FOR SOLUTION INTRAVENOUS at 00:51

## 2020-05-26 RX ADMIN — NICOTINE 1 PATCH: 21 PATCH, EXTENDED RELEASE TRANSDERMAL at 08:00

## 2020-05-26 RX ADMIN — BUPRENORPHINE HYDROCHLORIDE, NALOXONE HYDROCHLORIDE 1 FILM: 4; 1 FILM, SOLUBLE BUCCAL; SUBLINGUAL at 21:00

## 2020-05-26 RX ADMIN — BUPRENORPHINE HYDROCHLORIDE, NALOXONE HYDROCHLORIDE 1 FILM: 4; 1 FILM, SOLUBLE BUCCAL; SUBLINGUAL at 16:20

## 2020-05-26 RX ADMIN — ACETAMINOPHEN 650 MG: 325 TABLET, FILM COATED ORAL at 08:07

## 2020-05-26 RX ADMIN — VANCOMYCIN HYDROCHLORIDE 1500 MG: 5 INJECTION, POWDER, LYOPHILIZED, FOR SOLUTION INTRAVENOUS at 16:20

## 2020-05-26 RX ADMIN — DIPHENHYDRAMINE HYDROCHLORIDE 25 MG: 25 CAPSULE ORAL at 16:24

## 2020-05-26 RX ADMIN — ACETAMINOPHEN 650 MG: 325 TABLET, FILM COATED ORAL at 00:50

## 2020-05-26 RX ADMIN — ACETAMINOPHEN 650 MG: 325 TABLET, FILM COATED ORAL at 16:23

## 2020-05-26 RX ADMIN — BUPRENORPHINE HYDROCHLORIDE, NALOXONE HYDROCHLORIDE 1 FILM: 4; 1 FILM, SOLUBLE BUCCAL; SUBLINGUAL at 08:00

## 2020-05-26 RX ADMIN — DIPHENHYDRAMINE HYDROCHLORIDE 25 MG: 25 CAPSULE ORAL at 08:07

## 2020-05-26 RX ADMIN — Medication 1 MG: at 00:50

## 2020-05-26 ASSESSMENT — ACTIVITIES OF DAILY LIVING (ADL)
ADLS_ACUITY_SCORE: 10

## 2020-05-26 NOTE — PLAN OF CARE
Pt A&Ox4. VSS on RA. Regular diet. IV SL, intermittent Vancomycin. Rash to abdomen decreasing. PRN benadryl given x1 this shift. Independent in room. Will continue to monitor.

## 2020-05-26 NOTE — PLAN OF CARE
[id number]. Pt A/O x4. Nicotine patch in place. VSS on RA. Up independently in room. Tolerated regular diet. Denied pain, dizziness or feeling SOB. Will continue to monitor.

## 2020-05-26 NOTE — PROGRESS NOTES
[facility]  Hospitalist Progress Note    José Manuel Augustin MD  05/26/2020    Assessment & Plan      Jcarlos Maravilla is a 24 year old male with a past medical history including IV heroin, who was initially hospitalization at [facility] 5/3-5/4 with febrile illness associated with IV heroin usage. However, after discharge, blood cultures became positive for MSSA and pt was subsequently directly admitted to [facility] 5/5/2020 for further workup including SKYE.     MSSA bacteremia, suspect related to IV drug use.  * Initially presented to [facility] evening 5/3/2020 following encouragement from police officers to seek evaluation following use of heroin with his friend, who overdosed. On initial evaluation there, was febrile, with signs of sepsis with elevated lactic acid, procalcitonin 1.22. Subsequently admitted and treated with broad antibiotics. COVID-19 PCR 5/4 negative. TTE 5/4 negative for vegetation. Sepsis resolved and pt was discharged 5/4 on Augmentin (for thrombophlebitis). However, on 5/5, blood cultures from 5/3 came back positive for MSSA and pt sent to [facility] ED for repeat BC's and subsequently directly admitted to [facility] 5/5.   * Started on cefazolin on admit 5/5. KSYE 5/6 negative for vegetations. ID consulted. BC's from 5/5, 5/6 and 05/07 NGTD.  - Continue cefazolin (started 5/5) - plan 4 weeks of IV antibiotics (stop 6/2/2020); will likely remain hospitalized here to complete the course unless accepting TCU can be found (unlikely).  -Cefazolin was stopped and vancomycin was started due to the rash and is planning day 14/28 of antibiotic  - has peripheral line in place  - Monitor cultures.  Negative so far     Superficial occlusive thrombophlebitis of right median cubital vein.  * Diagnosed by US at [facility] 5/4. Was started on Eliquis at [facility] 5/4 and discharged on Augmentin. Did not fill scripts yet after discharge.  * Thrombophlebitis improved despite not continuing Eliquis after  recent discharge. Improved after admission off anticoagulation.  - Continue off anticoagulation.  - Monitor site of thrombophlebitis, if worsens, could consider restarting AC.  - On antibiotics as above.     IVDU - heroin.  * Patient reported using IV heroin use daily. He started using heroin 2 years ago and prefers IV injections. He admits to using around 1-2 gm daily. He had an accidental overdose 3/2020 requiring 12 mg of narcan. Patients father was a known drug user and  of an overdose. He reportedly was working on getting enrolled in [employer] in [place] but had been waiting due to lack of insurance.   * On admit, patient expressed interest in Suboxone. Seen by Psychiatry consulted  and started on Suboxone.  - Psych reconsulted on - currently on Subaxone 1 film TID  - Clonidine changed from prn to 0.1 mg po at bedtime   - plan continue at discharge, consult Psychiatry to order for discharge, appreciate help with Suboxone management.  - Continue PRN loperamide.  - Follow-up CD treatment after discharge.     Anemia, suspect chronic disease.  Pt anemic 10-11 range noted during recent hospitalization. No obvious signs of blood loss. Hgb 11.0 on admit . 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   - Monitor CBC.  - Consider prbc transfusion if hgb </= 7.0 or if significant bleeding with hemodynamic instability or if symptomatic.  -His peripheral smear is suggesting a possible hemoglobinopathy, hgb is improving, would reassess after infection is treated as outpatient.      Nicotine dependence  Patient reported smoking 1.5 PPD. Started on nicotine patch.  - Continue nicotine 21 mg patch.     Drug rash  - noted to have a rash on his forearms bilateral and left sided chest, the rash is itchy  - suspected from IV abx  - cortisone cream TID prn, Benadryl po prn  - abx change to vancomycin with improvement      Diet: Regular Diet Adult    Prophylaxis:  PCD's, ambulation.   Cook Catheter: not present  Code Status: Full Code       Disposition Plan     Expected discharge: after 6/2/2020, recommended to prior living arrangement after completion of IV antibiotics (home not an option given IV drug use; TCU's unlikely to accept pt given IV drug use).    Interval History      -- no acute events overnigh  -- No new CP/SOB. No fevers or chills  -- No new complaints  -- no diarrhea, no abdominal pain. No nausea/vomiting.     -Data reviewed today: I reviewed all new labs and imaging over the last 24 hours. I personally reviewed no images or EKG's today.    Physical Exam   Heart Rate: 75, Blood pressure 118/74, pulse 81, temperature 98.3  F (36.8  C), temperature source Oral, resp. rate 15, weight 102.9 kg (226 lb 12.8 oz), SpO2 96 %.  Vitals:    05/21/20 0546 05/25/20 0649 05/26/20 0557   Weight: 101.2 kg (223 lb) 103.4 kg (228 lb) 102.9 kg (226 lb 12.8 oz)     Vital Signs with Ranges  Temp:  [97.6  F (36.4  C)-98.6  F (37  C)] 98.3  F (36.8  C)  Pulse:  [81] 81  Heart Rate:  [75-83] 75  Resp:  [15-18] 15  BP: (118-119)/(72-74) 118/74  SpO2:  [96 %-97 %] 96 %  I/O's Last 24 hours  I/O last 3 completed shifts:  In: 240 [P.O.:240]  Out: -     Constitutional:  no apparent distress  Respiratory: CTABL  Cardiovascular: Regular rate and rhythm, normal S1 and S2, and no murmur noted  GI: Normal bowel sounds, soft, non-distended, non-tender  Skin/Integumen: + rash that is fading, no cyanosis, no edema  Other: Normal mood and affect    Medications   All medications were reviewed.      buprenorphine HCl-naloxone HCl  1 Film Sublingual TID     cloNIDine  0.1 mg Oral At Bedtime     nicotine  1 patch Transdermal Daily     nicotine   Transdermal Q8H     sodium chloride (PF)  10 mL Intracatheter Q8H     sodium chloride (PF)  3 mL Intracatheter Q8H     vancomycin (VANCOCIN) IV  1,500 mg Intravenous Q8H        Data   Recent Labs   Lab 05/26/20  0717 05/21/20  0633 05/20/20  0627   WBC 6.6   --   --    HGB 12.8*  --   --    MCV 56*  --   --      --   --      --   --    POTASSIUM 4.0  --   --    CHLORIDE 103  --   --    CO2 28  --   --    BUN 12  --   --    CR 0.68 0.67 0.67   ANIONGAP 4  --   --    MAGALI 9.2  --   --    GLC 88  --   --        No results found for this or any previous visit (from the past 24 hour(s)).    José Manuel Augustin MD  Text Page  (7am to 6pm)

## 2020-05-26 NOTE — PLAN OF CARE
A&Ox4. VSS on RA. Up independently. Voiding. Passing gas. Tylenol and benadryl given for tooth pain and rash.

## 2020-05-26 NOTE — PROGRESS NOTES
[facility]    Infectious Disease Progress Note    Date of Service (when I saw the patient): 05/14/2020     Assessment & Plan   Jcarlos Maravilla is a 24 year old male who was admitted on 5/5/2020.     Impression:  1. 24 y.o male with IV heroin use.   2. Admitted with right antecubital superficial thrombophlebitis.   3. Bacteremic with MSSA. Only 1/4 cultures.   4. On ancef.   5. TTE negative.      Recommendations:   1. Given MSSA bacteremia anticipate 4 weeks total of IV antibiotics  day 21/28 today.     Multiple dispositon issues      Bekah Ackerman MD    Interval History    Rash on the torso and UE much improved   Repeat cultures are negative so far   No new complaints     Physical Exam   Temp: 98.3  F (36.8  C) Temp src: Oral BP: 118/74 Pulse: 81 Heart Rate: 75 Resp: 16 SpO2: 96 % O2 Device: None (Room air)    Vitals:    05/21/20 0546 05/25/20 0649 05/26/20 0557   Weight: 101.2 kg (223 lb) 103.4 kg (228 lb) 102.9 kg (226 lb 12.8 oz)     Vital Signs with Ranges  Temp:  [97.6  F (36.4  C)-98.6  F (37  C)] 98.3  F (36.8  C)  Pulse:  [81] 81  Heart Rate:  [75-83] 75  Resp:  [16-18] 16  BP: (118-119)/(72-74) 118/74  SpO2:  [96 %-97 %] 96 %    Constitutional: Awake, alert, cooperative, no apparent distress  Lungs: Clear to auscultation bilaterally, no crackles or wheezing  Cardiovascular: Regular rate and rhythm, normal S1 and S2, and no murmur noted  Abdomen: Normal bowel sounds, soft, non-distended, non-tender  Skin: Rash on UE and torso   Other:    Medications       buprenorphine HCl-naloxone HCl  1 Film Sublingual TID     cloNIDine  0.1 mg Oral At Bedtime     nicotine  1 patch Transdermal Daily     nicotine   Transdermal Q8H     sodium chloride (PF)  10 mL Intracatheter Q8H     sodium chloride (PF)  3 mL Intracatheter Q8H     vancomycin (VANCOCIN) IV  1,500 mg Intravenous Q8H       Data   All microbiology laboratory data reviewed.  Recent Labs   Lab Test 05/26/20  0717 05/17/20  0616 05/14/20  0603    WBC 6.6 6.2 5.1   HGB 12.8* 12.4* 12.2*   HCT 40.9 40.4 39.4*   MCV 56* 57* 57*    306 344     Recent Labs   Lab Test 05/26/20  0717 05/21/20  0633 05/20/20  0627   CR 0.68 0.67 0.67     No lab results found.  Recent Labs   Lab Test 05/06/20  2335 05/06/20  1129 05/05/20  1106 05/05/20  1043 05/04/20  0138 05/04/20  0046   CULT No growth No growth No growth No growth No growth Cultured on the 1st day of incubation:  Staphylococcus aureus  *  Critical Value/Significant Value, preliminary result only, called to and read back by  Dr Mims, On call hospitalist, 5/5/20 @ 0239 TF    (Note)  POSITIVE for STAPHYLOCOCCUS AUREUS and NEGATIVE for the mecA gene  (not MRSA) by Paper Hunterigene multiplex nucleic acid test. The mecA gene was  not detected. Final identification and antimicrobial susceptibility  testing will be verified by standard methods.    Specimen tested with Verigene multiplex, gram-positive blood culture  nucleic acid test for the following targets: Staph aureus, Staph  epidermidis, Staph lugdunensis, other Staph species, Enterococcus  faecalis, Enterococcus faecium, Streptococcus species, S. agalactiae,  S. anginosus grp., S. pneumoniae, S. pyogenes, Listeria sp., mecA  (methicillin resistance) and Rico/B (vancomycin resistance).    Critical Value/Significant Value called to and read back by DR YURI MIMS ON CALL HOSPITALIST [facility] 0603 05.05.20 CF         Attestation:  Total time on the floor involved in the patient's care: 35 minutes. Total time spent in counseling/care coordination: >50%

## 2020-05-27 ENCOUNTER — ANESTHESIA EVENT (OUTPATIENT)
Dept: MEDSURG UNIT | Facility: CLINIC | Age: 25
End: 2020-05-27
Payer: MEDICAID

## 2020-05-27 ENCOUNTER — ANESTHESIA (OUTPATIENT)
Dept: MEDSURG UNIT | Facility: CLINIC | Age: 25
End: 2020-05-27
Payer: MEDICAID

## 2020-05-27 PROCEDURE — 05HY33Z INSERTION OF INFUSION DEVICE INTO UPPER VEIN, PERCUTANEOUS APPROACH: ICD-10-PCS | Performed by: NURSE ANESTHETIST, CERTIFIED REGISTERED

## 2020-05-27 PROCEDURE — 12000000 ZZH R&B MED SURG/OB

## 2020-05-27 PROCEDURE — 37000011 ZZH ANESTHESIA WARD SERVICE: Performed by: NURSE ANESTHETIST, CERTIFIED REGISTERED

## 2020-05-27 PROCEDURE — 40000671 ZZH STATISTIC ANESTHESIA CASE

## 2020-05-27 PROCEDURE — 25000132 ZZH RX MED GY IP 250 OP 250 PS 637: Performed by: NURSE PRACTITIONER

## 2020-05-27 PROCEDURE — 25000132 ZZH RX MED GY IP 250 OP 250 PS 637: Performed by: PSYCHIATRY & NEUROLOGY

## 2020-05-27 PROCEDURE — 25000132 ZZH RX MED GY IP 250 OP 250 PS 637: Performed by: INTERNAL MEDICINE

## 2020-05-27 PROCEDURE — 25000128 H RX IP 250 OP 636: Performed by: INTERNAL MEDICINE

## 2020-05-27 PROCEDURE — 3E03329 INTRODUCTION OF OTHER ANTI-INFECTIVE INTO PERIPHERAL VEIN, PERCUTANEOUS APPROACH: ICD-10-PCS | Performed by: NURSE ANESTHETIST, CERTIFIED REGISTERED

## 2020-05-27 PROCEDURE — 99232 SBSQ HOSP IP/OBS MODERATE 35: CPT | Performed by: HOSPITALIST

## 2020-05-27 PROCEDURE — 25800030 ZZH RX IP 258 OP 636: Performed by: INTERNAL MEDICINE

## 2020-05-27 PROCEDURE — 99207 ZZC CDG-MDM COMPONENT: MEETS MODERATE - UP CODED: CPT | Performed by: HOSPITALIST

## 2020-05-27 RX ADMIN — VANCOMYCIN HYDROCHLORIDE 1500 MG: 5 INJECTION, POWDER, LYOPHILIZED, FOR SOLUTION INTRAVENOUS at 08:39

## 2020-05-27 RX ADMIN — VANCOMYCIN HYDROCHLORIDE 1500 MG: 5 INJECTION, POWDER, LYOPHILIZED, FOR SOLUTION INTRAVENOUS at 00:34

## 2020-05-27 RX ADMIN — VANCOMYCIN HYDROCHLORIDE 1500 MG: 5 INJECTION, POWDER, LYOPHILIZED, FOR SOLUTION INTRAVENOUS at 20:30

## 2020-05-27 RX ADMIN — ACETAMINOPHEN 650 MG: 325 TABLET, FILM COATED ORAL at 00:44

## 2020-05-27 RX ADMIN — CLONIDINE HYDROCHLORIDE 0.1 MG: 0.1 TABLET ORAL at 22:43

## 2020-05-27 RX ADMIN — Medication 1 MG: at 02:00

## 2020-05-27 RX ADMIN — Medication 1 MG: at 23:55

## 2020-05-27 RX ADMIN — DIPHENHYDRAMINE HYDROCHLORIDE 25 MG: 25 CAPSULE ORAL at 02:00

## 2020-05-27 RX ADMIN — BUPRENORPHINE HYDROCHLORIDE, NALOXONE HYDROCHLORIDE 1 FILM: 4; 1 FILM, SOLUBLE BUCCAL; SUBLINGUAL at 22:43

## 2020-05-27 RX ADMIN — DIPHENHYDRAMINE HYDROCHLORIDE 25 MG: 25 CAPSULE ORAL at 17:52

## 2020-05-27 RX ADMIN — DIPHENHYDRAMINE HYDROCHLORIDE 25 MG: 25 CAPSULE ORAL at 23:55

## 2020-05-27 RX ADMIN — BUPRENORPHINE HYDROCHLORIDE, NALOXONE HYDROCHLORIDE 1 FILM: 4; 1 FILM, SOLUBLE BUCCAL; SUBLINGUAL at 09:44

## 2020-05-27 RX ADMIN — BUPRENORPHINE HYDROCHLORIDE, NALOXONE HYDROCHLORIDE 1 FILM: 4; 1 FILM, SOLUBLE BUCCAL; SUBLINGUAL at 17:52

## 2020-05-27 RX ADMIN — ACETAMINOPHEN 650 MG: 325 TABLET, FILM COATED ORAL at 17:52

## 2020-05-27 RX ADMIN — NICOTINE 1 PATCH: 21 PATCH, EXTENDED RELEASE TRANSDERMAL at 09:44

## 2020-05-27 RX ADMIN — ACETAMINOPHEN 650 MG: 325 TABLET, FILM COATED ORAL at 22:43

## 2020-05-27 ASSESSMENT — ACTIVITIES OF DAILY LIVING (ADL)
ADLS_ACUITY_SCORE: 10

## 2020-05-27 NOTE — PLAN OF CARE
VSS on RA. R tooth pain managed w/ tylenol. A &O x4. Independent. LS clear and equal bilaterally. No concerns for bowel or bladder. R PIV saline locked. Abdominal rash, benadryl given for itching. Regular diet. Continue to monitor.

## 2020-05-27 NOTE — PLAN OF CARE
A&OX4.  Up independently in room.  Prn tylenol given for tooth ache and benadryl given for itchy skin from rash.  IV Vanco every 8 hours.  Encouraged patient to go for walk around nursing unit but he declined.  Discharge is pending placement.

## 2020-05-27 NOTE — PROGRESS NOTES
[facility]  Hospitalist Progress Note     When I evaluated patient: 05/27/2020  Assessment & Plan      Jcarlos Maravilla is a 24 year old male with a past medical history including IV heroin, who was initially hospitalization at [facility] 5/3-5/4 with febrile illness associated with IV heroin usage. However, after discharge, blood cultures became positive for MSSA and pt was subsequently directly admitted to [facility] 5/5/2020 for further workup including SKYE.     MSSA bacteremia, suspect related to IV drug use.  * Initially presented to [facility] evening 5/3/2020 following encouragement from police officers to seek evaluation following use of heroin with his friend, who overdosed. On initial evaluation there, was febrile, with signs of sepsis with elevated lactic acid, procalcitonin 1.22. Subsequently admitted and treated with broad antibiotics. COVID-19 PCR 5/4 negative. TTE 5/4 negative for vegetation. Sepsis resolved and pt was discharged 5/4 on Augmentin (for thrombophlebitis). However, on 5/5, blood cultures from 5/3 came back positive for MSSA and pt sent to [facility] ED for repeat BC's and subsequently directly admitted to [facility] 5/5.   * Started on cefazolin on admit 5/5. SKYE 5/6 negative for vegetations. ID consulted. BC's from 5/5, 5/6 and 05/07 NGTD.  - Continue cefazolin (started 5/5) - plan 4 weeks of IV antibiotics (stop 6/2/2020); will likely remain hospitalized here to complete the course unless accepting TCU can be found (unlikely).  -Cefazolin was stopped and vancomycin was started due to the rash and is planning day 14/28 of antibiotic  - has peripheral line in place  - Monitor cultures.  Negative so far     Superficial occlusive thrombophlebitis of right median cubital vein.  * Diagnosed by US at [facility] 5/4. Was started on Eliquis at [facility] 5/4 and discharged on Augmentin. Did not fill scripts yet after discharge.  * Thrombophlebitis improved despite not continuing  Eliquis after recent discharge. Improved after admission off anticoagulation.  - Continue off anticoagulation.  - Monitor site of thrombophlebitis, if worsens, could consider restarting AC.  - On antibiotics as above.     IVDU - heroin.  * Patient reported using IV heroin use daily. He started using heroin 2 years ago and prefers IV injections. He admits to using around 1-2 gm daily. He had an accidental overdose 3/2020 requiring 12 mg of narcan. Patients father was a known drug user and  of an overdose. He reportedly was working on getting enrolled in [employer] in [place] but had been waiting due to lack of insurance.   * On admit, patient expressed interest in Suboxone. Seen by Psychiatry consulted  and started on Suboxone.  - Psych reconsulted on - currently on Subaxone 1 film TID  - Clonidine changed from prn to 0.1 mg po at bedtime   - plan continue at discharge, consult Psychiatry to order medication at discharge, appreciate help    - Continue PRN loperamide.  - Follow-up CD treatment after discharge.     Anemia, suspect chronic disease.  Pt anemic 10-11 range noted during recent hospitalization. No obvious signs of blood loss. Hgb 11.0 on admit . 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   - Monitor CBC.  - Consider prbc transfusion if hgb </= 7.0 or if significant bleeding with hemodynamic instability or if symptomatic.  -His peripheral smear is suggesting a possible hemoglobinopathy, hgb is improving, would reassess after infection is treated as outpatient.      Nicotine dependence  Patient reported smoking 1.5 PPD. Started on nicotine patch.  - Continue nicotine 21 mg patch.     Drug rash  - noted to have a rash on his forearms bilateral and left sided chest, the rash is itchy  - suspected from IV abx  - cortisone cream TID prn, Benadryl po prn  - abx change to vancomycin with improvement      Diet: Regular Diet Adult    Prophylaxis:  PCD's, ambulation.   Cook Catheter: not present  Code Status: Full Code       Disposition Plan     Expected discharge: after 6/2/2020, recommended to prior living arrangement after completion of IV antibiotics (home not an option given IV drug use; TCU's unlikely to accept pt given IV drug use).    Interval History    --Chart reviewed, care resumed, discussed with patient and RN.   -- No acute events overnight, no complaints.     -Data reviewed today: I reviewed all new labs  over the last 24 hours. I personally reviewed no images or EKG's today.    Physical Exam   Heart Rate: 68, Blood pressure 111/65, pulse 80, temperature 97.9  F (36.6  C), temperature source Oral, resp. rate 17, weight 103.9 kg (229 lb), SpO2 96 %.  Vitals:    05/25/20 0649 05/26/20 0557 05/27/20 0611   Weight: 103.4 kg (228 lb) 102.9 kg (226 lb 12.8 oz) 103.9 kg (229 lb)     Vital Signs with Ranges  Temp:  [97.9  F (36.6  C)-98.4  F (36.9  C)] 97.9  F (36.6  C)  Pulse:  [80] 80  Heart Rate:  [68-84] 68  Resp:  [16-17] 17  BP: (111-118)/(65-73) 111/65  SpO2:  [95 %-96 %] 96 %  I/O's Last 24 hours  I/O last 3 completed shifts:  In: 1453 [P.O.:1200; I.V.:253]  Out: -     Constitutional:  no apparent distress  Respiratory: CTA b/l. Normal work of breathing  Cardiovascular: Regular rate and rhythm, normal S1 and S2, and no murmur noted  GI: Normal bowel sounds, soft, non-distended, non-tender  Skin/Integumen:  no cyanosis, no edema  Other: Normal mood and affect    Medications   All medications were reviewed.      buprenorphine HCl-naloxone HCl  1 Film Sublingual TID     cloNIDine  0.1 mg Oral At Bedtime     nicotine  1 patch Transdermal Daily     nicotine   Transdermal Q8H     sodium chloride (PF)  10 mL Intracatheter Q8H     sodium chloride (PF)  3 mL Intracatheter Q8H     vancomycin (VANCOCIN) IV  1,500 mg Intravenous Q8H        Data   Recent Labs   Lab 05/26/20  0717 05/21/20  0633   WBC 6.6  --    HGB 12.8*  --    MCV 56*  --      --       --    POTASSIUM 4.0  --    CHLORIDE 103  --    CO2 28  --    BUN 12  --    CR 0.68 0.67   ANIONGAP 4  --    MAGALI 9.2  --    GLC 88  --        No results found for this or any previous visit (from the past 24 hour(s)).

## 2020-05-27 NOTE — PROGRESS NOTES
Pt is independent in room. VSS. Received PRN tylenol for tooth ache w/effect and Benadryl for itching. IV vanco X1. Will continue to monitor.

## 2020-05-28 PROCEDURE — 99232 SBSQ HOSP IP/OBS MODERATE 35: CPT | Performed by: HOSPITALIST

## 2020-05-28 PROCEDURE — 25000128 H RX IP 250 OP 636: Performed by: INTERNAL MEDICINE

## 2020-05-28 PROCEDURE — 25000132 ZZH RX MED GY IP 250 OP 250 PS 637: Performed by: PSYCHIATRY & NEUROLOGY

## 2020-05-28 PROCEDURE — 25800030 ZZH RX IP 258 OP 636: Performed by: INTERNAL MEDICINE

## 2020-05-28 PROCEDURE — 25000132 ZZH RX MED GY IP 250 OP 250 PS 637: Performed by: INTERNAL MEDICINE

## 2020-05-28 PROCEDURE — 99207 ZZC CDG-MDM COMPONENT: MEETS MODERATE - UP CODED: CPT | Performed by: HOSPITALIST

## 2020-05-28 PROCEDURE — 25000132 ZZH RX MED GY IP 250 OP 250 PS 637: Performed by: NURSE PRACTITIONER

## 2020-05-28 PROCEDURE — 25000132 ZZH RX MED GY IP 250 OP 250 PS 637: Performed by: HOSPITALIST

## 2020-05-28 PROCEDURE — 12000000 ZZH R&B MED SURG/OB

## 2020-05-28 RX ORDER — DIPHENHYDRAMINE HCL 25 MG
25 CAPSULE ORAL
Status: DISCONTINUED | OUTPATIENT
Start: 2020-05-28 | End: 2020-06-03 | Stop reason: HOSPADM

## 2020-05-28 RX ADMIN — BUPRENORPHINE HYDROCHLORIDE, NALOXONE HYDROCHLORIDE 1 FILM: 4; 1 FILM, SOLUBLE BUCCAL; SUBLINGUAL at 22:58

## 2020-05-28 RX ADMIN — CLONIDINE HYDROCHLORIDE 0.1 MG: 0.1 TABLET ORAL at 22:58

## 2020-05-28 RX ADMIN — Medication 1 MG: at 23:38

## 2020-05-28 RX ADMIN — DIPHENHYDRAMINE HYDROCHLORIDE 25 MG: 25 CAPSULE ORAL at 23:38

## 2020-05-28 RX ADMIN — NICOTINE 1 PATCH: 21 PATCH, EXTENDED RELEASE TRANSDERMAL at 08:54

## 2020-05-28 RX ADMIN — VANCOMYCIN HYDROCHLORIDE 1500 MG: 5 INJECTION, POWDER, LYOPHILIZED, FOR SOLUTION INTRAVENOUS at 05:11

## 2020-05-28 RX ADMIN — DIPHENHYDRAMINE HYDROCHLORIDE 25 MG: 25 CAPSULE ORAL at 08:55

## 2020-05-28 RX ADMIN — VANCOMYCIN HYDROCHLORIDE 1500 MG: 5 INJECTION, POWDER, LYOPHILIZED, FOR SOLUTION INTRAVENOUS at 23:35

## 2020-05-28 RX ADMIN — VANCOMYCIN HYDROCHLORIDE 1500 MG: 5 INJECTION, POWDER, LYOPHILIZED, FOR SOLUTION INTRAVENOUS at 14:22

## 2020-05-28 RX ADMIN — BUPRENORPHINE HYDROCHLORIDE, NALOXONE HYDROCHLORIDE 1 FILM: 4; 1 FILM, SOLUBLE BUCCAL; SUBLINGUAL at 16:38

## 2020-05-28 RX ADMIN — BUPRENORPHINE HYDROCHLORIDE, NALOXONE HYDROCHLORIDE 1 FILM: 4; 1 FILM, SOLUBLE BUCCAL; SUBLINGUAL at 08:54

## 2020-05-28 ASSESSMENT — ACTIVITIES OF DAILY LIVING (ADL)
ADLS_ACUITY_SCORE: 10

## 2020-05-28 NOTE — ANESTHESIA CARE TRANSFER NOTE
Patient: Jcarlos Maravilla    * No procedures listed *    Diagnosis: * No pre-op diagnosis entered *  Diagnosis Additional Information: No value filed.    Anesthesia Type:   No value filed.     Note:  Airway :Room Air  Patient transferred to:Medical/Surgical Unit  Handoff Report: Identifed the Patient, Identified the Reponsible Provider, Reviewed the pertinent medical history, Discussed the surgical course, Reviewed Intra-OP anesthesia mangement and issues during anesthesia, Set expectations for post-procedure period and Allowed opportunity for questions and acknowledgement of understanding      Vitals: (Last set prior to Anesthesia Care Transfer)              Electronically Signed By: TOYA Munoz CRNA  May 27, 2020  8:37 PM

## 2020-05-28 NOTE — PLAN OF CARE
A&OX4. Patient took a shower this morning.  Benadryl given this AM for itchy skin and patient has slept most of the day into the afternoon.  Hospitalist discussed with him that benadryl will now only be available once per day at bedtime.  Educated patient on the importance of getting up and going on walks around the nursing station, his plan is to go for a walk today before bedtime.  Denies pain.  IV Vanco every 8 hours.

## 2020-05-28 NOTE — PROGRESS NOTES
Called pharmacist to clarify if it is ok to give Vanco today without checking vanco level she said yes ok to give the lab will be checked tomorrow.      Per patient request the Vanco will be given at 1430 instead of the scheduled time of 1230 due to patient wanting to push the time back so he is not woken up at 4:30am.

## 2020-05-28 NOTE — PROGRESS NOTES
[facility]  Hospitalist Progress Note     When I evaluated patient: 05/28/2020  Assessment & Plan      Jcarlos Maravilla is a 24 year old male with a past medical history including IV heroin, who was initially hospitalization at [facility] 5/3-5/4 with febrile illness associated with IV heroin usage. However, after discharge, blood cultures became positive for MSSA and pt was subsequently directly admitted to [facility] 5/5/2020 for further workup including SKYE.     MSSA bacteremia, suspect related to IV drug use.  * Initially presented to [facility] evening 5/3/2020 following encouragement from police officers to seek evaluation following use of heroin with his friend, who overdosed. On initial evaluation there, was febrile, with signs of sepsis with elevated lactic acid, procalcitonin 1.22. Subsequently admitted and treated with broad antibiotics. COVID-19 PCR 5/4 negative. TTE 5/4 negative for vegetation. Sepsis resolved and pt was discharged 5/4 on Augmentin (for thrombophlebitis). However, on 5/5, blood cultures from 5/3 came back positive for MSSA and pt sent to [facility] ED for repeat BC's and subsequently directly admitted to [facility] 5/5.   * Started on cefazolin on admit 5/5. SKYE 5/6 negative for vegetations. ID consulted. BC's from 5/5, 5/6 and 05/07 NGTD.  - Continue cefazolin (started 5/5) - plan 4 weeks of IV antibiotics (stop 6/2/2020); will likely remain hospitalized here to complete the course unless accepting TCU can be found (unlikely).  -Cefazolin was stopped and vancomycin was started due to the rash and is planning day 14/28 of antibiotic  - has peripheral line in place  - Monitor cultures.  Negative so far     Superficial occlusive thrombophlebitis of right median cubital vein.  * Diagnosed by US at [facility] 5/4. Was started on Eliquis at [facility] 5/4 and discharged on Augmentin. Did not fill scripts yet after discharge.  * Thrombophlebitis improved despite not continuing  Eliquis after recent discharge. Improved after admission off anticoagulation.  - Continue off anticoagulation.  - Monitor site of thrombophlebitis, if worsens, could consider restarting AC.  - On antibiotics as above.     IVDU - heroin.  * Patient reported using IV heroin use daily. He started using heroin 2 years ago and prefers IV injections. He admits to using around 1-2 gm daily. He had an accidental overdose 3/2020 requiring 12 mg of narcan. Patients father was a known drug user and  of an overdose. He reportedly was working on getting enrolled in [employer] in [place] but had been waiting due to lack of insurance.   * On admit, patient expressed interest in Suboxone. Seen by Psychiatry consulted  and started on Suboxone.  - Psych reconsulted on - currently on Subaxone 1 film TID  - Clonidine changed from prn to 0.1 mg po at bedtime   - plan continue at discharge, consult Psychiatry to order medication at discharge, appreciate help    - Continue PRN loperamide.  - Follow-up CD treatment after discharge.     Anemia, suspect chronic disease.  Pt anemic 10-11 range noted during recent hospitalization. No obvious signs of blood loss. Hgb 11.0 on admit . 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   - Monitor CBC.  - Consider prbc transfusion if hgb </= 7.0 or if significant bleeding with hemodynamic instability or if symptomatic.  -His peripheral smear is suggesting a possible hemoglobinopathy, hgb is improving, would reassess after infection is treated as outpatient.      Nicotine dependence  Patient reported smoking 1.5 PPD. Started on nicotine patch.  - Continue nicotine 21 mg patch.     Drug rash  - noted to have a rash on his forearms bilateral and left sided chest, the rash is itchy  - suspected from IV abx  - cortisone cream TID prn, Benadryl po prn  - abx change to vancomycin with improvement      Diet: Regular Diet Adult    Prophylaxis:  PCD's, ambulation.   Cook Catheter: not present  Code Status: Full Code       Disposition Plan     Expected discharge: after 6/2/2020, recommended to prior living arrangement after completion of IV antibiotics (home not an option given IV drug use; TCU's unlikely to accept pt given IV drug use).    Interval History    --Chart reviewed, care resumed, discussed with patient and RN.   -- No acute events overnight, no complaints.     -Data reviewed today: I reviewed all new labs  over the last 24 hours. I personally reviewed no images or EKG's today.    Physical Exam   Heart Rate: 98, Blood pressure 128/75, pulse 96, temperature 98.2  F (36.8  C), temperature source Oral, resp. rate 16, weight 103.9 kg (229 lb), SpO2 97 %.  Vitals:    05/25/20 0649 05/26/20 0557 05/27/20 0611   Weight: 103.4 kg (228 lb) 102.9 kg (226 lb 12.8 oz) 103.9 kg (229 lb)     Vital Signs with Ranges  Temp:  [98.2  F (36.8  C)-98.8  F (37.1  C)] 98.2  F (36.8  C)  Pulse:  [] 96  Heart Rate:  [] 98  Resp:  [16] 16  BP: (111-128)/(71-75) 128/75  SpO2:  [94 %-97 %] 97 %  I/O's Last 24 hours  I/O last 3 completed shifts:  In: 1220 [P.O.:1220]  Out: -     Constitutional:  no apparent distress  Respiratory: CTA b/l. Normal work of breathing  Cardiovascular: Regular rate and rhythm, normal S1 and S2, and no murmur noted  GI: Normal bowel sounds, soft, non-distended, non-tender  Skin/Integumen:  no cyanosis, no edema  Other: Normal mood and affect    Medications   All medications were reviewed.      buprenorphine HCl-naloxone HCl  1 Film Sublingual TID     cloNIDine  0.1 mg Oral At Bedtime     nicotine  1 patch Transdermal Daily     nicotine   Transdermal Q8H     sodium chloride (PF)  10 mL Intracatheter Q8H     sodium chloride (PF)  3 mL Intracatheter Q8H     vancomycin (VANCOCIN) IV  1,500 mg Intravenous Q8H        Data   Recent Labs   Lab 05/26/20  0717   WBC 6.6   HGB 12.8*   MCV 56*         POTASSIUM 4.0   CHLORIDE 103    CO2 28   BUN 12   CR 0.68   ANIONGAP 4   MAGALI 9.2   GLC 88       No results found for this or any previous visit (from the past 24 hour(s)).

## 2020-05-29 LAB
CREAT SERPL-MCNC: 0.75 MG/DL (ref 0.66–1.25)
GFR SERPL CREATININE-BSD FRML MDRD: >90 ML/MIN/{1.73_M2}
VANCOMYCIN SERPL-MCNC: 17.7 MG/L

## 2020-05-29 PROCEDURE — 82565 ASSAY OF CREATININE: CPT | Performed by: INTERNAL MEDICINE

## 2020-05-29 PROCEDURE — 25800030 ZZH RX IP 258 OP 636: Performed by: INTERNAL MEDICINE

## 2020-05-29 PROCEDURE — 25000132 ZZH RX MED GY IP 250 OP 250 PS 637: Performed by: INTERNAL MEDICINE

## 2020-05-29 PROCEDURE — 12000000 ZZH R&B MED SURG/OB

## 2020-05-29 PROCEDURE — 25000128 H RX IP 250 OP 636: Performed by: INTERNAL MEDICINE

## 2020-05-29 PROCEDURE — 36415 COLL VENOUS BLD VENIPUNCTURE: CPT | Performed by: INTERNAL MEDICINE

## 2020-05-29 PROCEDURE — 25000132 ZZH RX MED GY IP 250 OP 250 PS 637: Performed by: PSYCHIATRY & NEUROLOGY

## 2020-05-29 PROCEDURE — 25000132 ZZH RX MED GY IP 250 OP 250 PS 637: Performed by: NURSE PRACTITIONER

## 2020-05-29 PROCEDURE — 80202 ASSAY OF VANCOMYCIN: CPT | Performed by: INTERNAL MEDICINE

## 2020-05-29 PROCEDURE — 99232 SBSQ HOSP IP/OBS MODERATE 35: CPT | Performed by: HOSPITALIST

## 2020-05-29 PROCEDURE — 99207 ZZC CDG-MDM COMPONENT: MEETS MODERATE - UP CODED: CPT | Performed by: HOSPITALIST

## 2020-05-29 RX ADMIN — BUPRENORPHINE HYDROCHLORIDE, NALOXONE HYDROCHLORIDE 1 FILM: 4; 1 FILM, SOLUBLE BUCCAL; SUBLINGUAL at 08:24

## 2020-05-29 RX ADMIN — BUPRENORPHINE HYDROCHLORIDE, NALOXONE HYDROCHLORIDE 1 FILM: 4; 1 FILM, SOLUBLE BUCCAL; SUBLINGUAL at 16:56

## 2020-05-29 RX ADMIN — BUPRENORPHINE HYDROCHLORIDE, NALOXONE HYDROCHLORIDE 1 FILM: 4; 1 FILM, SOLUBLE BUCCAL; SUBLINGUAL at 21:12

## 2020-05-29 RX ADMIN — VANCOMYCIN HYDROCHLORIDE 1500 MG: 5 INJECTION, POWDER, LYOPHILIZED, FOR SOLUTION INTRAVENOUS at 08:23

## 2020-05-29 RX ADMIN — VANCOMYCIN HYDROCHLORIDE 1500 MG: 5 INJECTION, POWDER, LYOPHILIZED, FOR SOLUTION INTRAVENOUS at 21:13

## 2020-05-29 RX ADMIN — NICOTINE 1 PATCH: 21 PATCH, EXTENDED RELEASE TRANSDERMAL at 08:24

## 2020-05-29 RX ADMIN — CLONIDINE HYDROCHLORIDE 0.1 MG: 0.1 TABLET ORAL at 21:12

## 2020-05-29 RX ADMIN — ACETAMINOPHEN 650 MG: 325 TABLET, FILM COATED ORAL at 13:28

## 2020-05-29 RX ADMIN — ACETAMINOPHEN 650 MG: 325 TABLET, FILM COATED ORAL at 18:31

## 2020-05-29 ASSESSMENT — ACTIVITIES OF DAILY LIVING (ADL)
ADLS_ACUITY_SCORE: 10

## 2020-05-29 NOTE — PROGRESS NOTES
Pt needs to report to parol officer(Baldemar Jean-Baptiste), needs documentation faxed to his officer via fax- [phone] stating his admission date of 5/5/20, pt will be released on 6/2/20.

## 2020-05-29 NOTE — PLAN OF CARE
Pt A&Ox4. VSS on RA. Rash to abdomen decreasing, PRN benadryl given at HS. IV SL, intermittent vancomycin. Voiding adequately. Up with independently in room. Pt denies pain. Will continue to monitor.

## 2020-05-29 NOTE — PROGRESS NOTES
[facility]  Hospitalist Progress Note     When I evaluated patient: 05/29/2020  Assessment & Plan      Jcarlos Maravilla is a 24 year old male with a past medical history including IV heroin, who was initially hospitalization at [facility] 5/3-5/4 with febrile illness associated with IV heroin usage. However, after discharge, blood cultures became positive for MSSA and pt was subsequently directly admitted to [facility] 5/5/2020 for further workup including SKYE.     MSSA bacteremia, suspect related to IV drug use.  * Initially presented to [facility] evening 5/3/2020 following encouragement from police officers to seek evaluation following use of heroin with his friend, who overdosed. On initial evaluation there, was febrile, with signs of sepsis with elevated lactic acid, procalcitonin 1.22. Subsequently admitted and treated with broad antibiotics. COVID-19 PCR 5/4 negative. TTE 5/4 negative for vegetation. Sepsis resolved and pt was discharged 5/4 on Augmentin (for thrombophlebitis). However, on 5/5, blood cultures from 5/3 came back positive for MSSA and pt sent to [facility] ED for repeat BC's and subsequently directly admitted to [facility] 5/5.   * Started on cefazolin on admit 5/5. SKYE 5/6 negative for vegetations. ID consulted. BC's from 5/5, 5/6 and 05/07 NGTD.  - Continue cefazolin (started 5/5) - plan 4 weeks of IV antibiotics (stop 6/2/2020); will likely remain hospitalized here to complete the course unless accepting TCU can be found (unlikely).  -Cefazolin was stopped and vancomycin was started due to the rash and is planning day 14/28 of antibiotic  - has peripheral line in place  - Monitor cultures.  Negative so far     Superficial occlusive thrombophlebitis of right median cubital vein.  * Diagnosed by US at [facility] 5/4. Was started on Eliquis at [facility] 5/4 and discharged on Augmentin. Did not fill scripts yet after discharge.  * Thrombophlebitis improved despite not continuing  Eliquis after recent discharge. Improved after admission off anticoagulation.  - Continue off anticoagulation.  - Monitor site of thrombophlebitis, if worsens, could consider restarting AC.  - On antibiotics as above.     IVDU - heroin.  * Patient reported using IV heroin use daily. He started using heroin 2 years ago and prefers IV injections. He admits to using around 1-2 gm daily. He had an accidental overdose 3/2020 requiring 12 mg of narcan. Patients father was a known drug user and  of an overdose. He reportedly was working on getting enrolled in [employer] in [place] but had been waiting due to lack of insurance.   * On admit, patient expressed interest in Suboxone. Seen by Psychiatry consulted  and started on Suboxone.  - Psych reconsulted on - currently on Subaxone 1 film TID, plan continue at discharge, consult Psychiatry prior to discharge to order medication at discharge, appreciate help    - Clonidine changed from prn to 0.1 mg po at bedtime   - Continue PRN loperamide.  - Follow-up CD treatment after discharge.     Anemia, suspect chronic disease.  Pt anemic 10-11 range noted during recent hospitalization. No obvious signs of blood loss. Hgb 11.0 on admit . 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   - Monitor CBC.  - Consider prbc transfusion if hgb </= 7.0 or if significant bleeding with hemodynamic instability or if symptomatic.  -His peripheral smear is suggesting a possible hemoglobinopathy, hgb is improving, would reassess after infection is treated as outpatient.      Nicotine dependence  Patient reported smoking 1.5 PPD. Started on nicotine patch.  - Continue nicotine 21 mg patch.     Drug rash  - noted to have a rash on his forearms bilateral and left sided chest, the rash is itchy  - suspected from IV abx  - cortisone cream TID prn, Benadryl po prn  - abx change to vancomycin with improvement      Diet: Regular Diet  Adult    Prophylaxis: PCD's, ambulation.   Cook Catheter: not present  Code Status: Full Code       Disposition Plan     Expected discharge: after 6/2/2020, recommended to prior living arrangement after completion of IV antibiotics (home not an option given IV drug use; TCU's unlikely to accept pt given IV drug use).    Interval History    -- No acute events overnight, no complaints.     -Data reviewed today: I reviewed all new labs  over the last 24 hours. I personally reviewed no images or EKG's today.    Physical Exam   Heart Rate: 90, Blood pressure 122/71, pulse 92, temperature 98.5  F (36.9  C), temperature source Oral, resp. rate 16, weight 103.9 kg (229 lb), SpO2 97 %.  Vitals:    05/25/20 0649 05/26/20 0557 05/27/20 0611   Weight: 103.4 kg (228 lb) 102.9 kg (226 lb 12.8 oz) 103.9 kg (229 lb)     Vital Signs with Ranges  Temp:  [98  F (36.7  C)-98.5  F (36.9  C)] 98.5  F (36.9  C)  Pulse:  [85-92] 92  Heart Rate:  [90] 90  Resp:  [14-16] 16  BP: (115-124)/(71-77) 122/71  SpO2:  [96 %-97 %] 97 %  I/O's Last 24 hours  I/O last 3 completed shifts:  In: 940 [P.O.:940]  Out: -     Constitutional: No apparent distress  Respiratory: CTA b/l. Normal work of breathing  Cardiovascular: Regular rate and rhythm, normal S1 and S2, and no murmur noted  GI: Normal bowel sounds, soft, non-distended, non-tender  Skin/Integumen:  no cyanosis, no edema  Other: Normal mood and affect    Medications   All medications were reviewed.      buprenorphine HCl-naloxone HCl  1 Film Sublingual TID     cloNIDine  0.1 mg Oral At Bedtime     nicotine  1 patch Transdermal Daily     nicotine   Transdermal Q8H     sodium chloride (PF)  10 mL Intracatheter Q8H     sodium chloride (PF)  3 mL Intracatheter Q8H     vancomycin (VANCOCIN) IV  1,500 mg Intravenous Q8H        Data   Recent Labs   Lab 05/26/20  0717   WBC 6.6   HGB 12.8*   MCV 56*         POTASSIUM 4.0   CHLORIDE 103   CO2 28   BUN 12   CR 0.68   ANIONGAP 4   MAGALI 9.2    GLC 88       No results found for this or any previous visit (from the past 24 hour(s)).

## 2020-05-29 NOTE — PHARMACY-VANCOMYCIN DOSING SERVICE
Pharmacy Vancomycin Note  Date of Service May 29, 2020  Patient's  1995   24 year old, male    Indication: MSSA w/SKYE negative for vegetation   Goal Trough Level: 10-15 mg/L  Day of Therapy: 17  Current Vancomycin regimen:  1500 mg IV q8h    Current estimated CrCl = Estimated Creatinine Clearance: 192.5 mL/min (based on SCr of 0.75 mg/dL).    Creatinine for last 3 days  2020:  2:02 PM Creatinine 0.75 mg/dL    Recent Vancomycin Levels (past 3 days)  2020:  2:02 PM Vancomycin Level 17.7 mg/L    Vancomycin IV Administrations (past 72 hours)                   vancomycin 1500 mg in 0.9% NaCl 250 ml intermittent infusion 1,500 mg (mg) 1,500 mg Given 20 0823     1,500 mg Given 20 2335     1,500 mg Given  1422     1,500 mg Given  0511     1,500 mg Given 20 2030     1,500 mg Given  0839     1,500 mg Given  0034     1,500 mg Given 20 1620                Nephrotoxins and other renal medications (From now, onward)    Start     Dose/Rate Route Frequency Ordered Stop    20 2100  vancomycin 1500 mg in 0.9% NaCl 250 ml intermittent infusion 1,500 mg      1,500 mg  over 90 Minutes Intravenous EVERY 12 HOURS 20 1553               Contrast Orders - past 72 hours (72h ago, onward)    None          Interpretation of levels and current regimen:  Trough level is  Supratherapeutic    Has serum creatinine changed > 50% in last 72 hours: No    Renal Function: Stable    Plan:  1.  Decrease Dose to 1500mg q12h  2.  Pharmacy will check trough levels as appropriate in 3-5 Days.    3. Serum creatinine levels will be ordered a minimum of twice weekly.      Wandy Alonzo [facility]        .

## 2020-05-29 NOTE — PROGRESS NOTES
Talked with pt and he stated that he talked with Baldemar and they did get his papers (faxed to Baldemar) that pt is in the hospital.

## 2020-05-30 LAB — VANCOMYCIN SERPL-MCNC: 6.4 MG/L

## 2020-05-30 PROCEDURE — 36415 COLL VENOUS BLD VENIPUNCTURE: CPT | Performed by: INTERNAL MEDICINE

## 2020-05-30 PROCEDURE — 25000132 ZZH RX MED GY IP 250 OP 250 PS 637: Performed by: INTERNAL MEDICINE

## 2020-05-30 PROCEDURE — 99232 SBSQ HOSP IP/OBS MODERATE 35: CPT | Performed by: HOSPITALIST

## 2020-05-30 PROCEDURE — 25000128 H RX IP 250 OP 636: Performed by: INTERNAL MEDICINE

## 2020-05-30 PROCEDURE — 25000132 ZZH RX MED GY IP 250 OP 250 PS 637: Performed by: NURSE PRACTITIONER

## 2020-05-30 PROCEDURE — 25800030 ZZH RX IP 258 OP 636: Performed by: INTERNAL MEDICINE

## 2020-05-30 PROCEDURE — 25000132 ZZH RX MED GY IP 250 OP 250 PS 637: Performed by: HOSPITALIST

## 2020-05-30 PROCEDURE — 12000000 ZZH R&B MED SURG/OB

## 2020-05-30 PROCEDURE — 80202 ASSAY OF VANCOMYCIN: CPT | Performed by: INTERNAL MEDICINE

## 2020-05-30 PROCEDURE — 25000132 ZZH RX MED GY IP 250 OP 250 PS 637: Performed by: PSYCHIATRY & NEUROLOGY

## 2020-05-30 PROCEDURE — 99207 ZZC CDG-MDM COMPONENT: MEETS MODERATE - UP CODED: CPT | Performed by: HOSPITALIST

## 2020-05-30 RX ORDER — IBUPROFEN 600 MG/1
600 TABLET, FILM COATED ORAL EVERY 6 HOURS PRN
Status: DISCONTINUED | OUTPATIENT
Start: 2020-05-30 | End: 2020-06-03 | Stop reason: HOSPADM

## 2020-05-30 RX ORDER — FAMOTIDINE 20 MG/1
20 TABLET, FILM COATED ORAL
Status: DISCONTINUED | OUTPATIENT
Start: 2020-05-30 | End: 2020-06-03 | Stop reason: HOSPADM

## 2020-05-30 RX ADMIN — ACETAMINOPHEN 650 MG: 325 TABLET, FILM COATED ORAL at 19:42

## 2020-05-30 RX ADMIN — DIPHENHYDRAMINE HYDROCHLORIDE 25 MG: 25 CAPSULE ORAL at 00:28

## 2020-05-30 RX ADMIN — VANCOMYCIN HYDROCHLORIDE 1500 MG: 5 INJECTION, POWDER, LYOPHILIZED, FOR SOLUTION INTRAVENOUS at 20:47

## 2020-05-30 RX ADMIN — BUPRENORPHINE HYDROCHLORIDE, NALOXONE HYDROCHLORIDE 1 FILM: 4; 1 FILM, SOLUBLE BUCCAL; SUBLINGUAL at 16:42

## 2020-05-30 RX ADMIN — NICOTINE 1 PATCH: 21 PATCH, EXTENDED RELEASE TRANSDERMAL at 08:43

## 2020-05-30 RX ADMIN — Medication 1 MG: at 00:28

## 2020-05-30 RX ADMIN — BUPRENORPHINE HYDROCHLORIDE, NALOXONE HYDROCHLORIDE 1 FILM: 4; 1 FILM, SOLUBLE BUCCAL; SUBLINGUAL at 08:43

## 2020-05-30 RX ADMIN — IBUPROFEN 600 MG: 600 TABLET ORAL at 17:58

## 2020-05-30 RX ADMIN — VANCOMYCIN HYDROCHLORIDE 1500 MG: 5 INJECTION, POWDER, LYOPHILIZED, FOR SOLUTION INTRAVENOUS at 08:30

## 2020-05-30 RX ADMIN — CLONIDINE HYDROCHLORIDE 0.1 MG: 0.1 TABLET ORAL at 22:16

## 2020-05-30 RX ADMIN — BUPRENORPHINE HYDROCHLORIDE, NALOXONE HYDROCHLORIDE 1 FILM: 4; 1 FILM, SOLUBLE BUCCAL; SUBLINGUAL at 22:16

## 2020-05-30 RX ADMIN — ACETAMINOPHEN 650 MG: 325 TABLET, FILM COATED ORAL at 08:45

## 2020-05-30 RX ADMIN — ACETAMINOPHEN 650 MG: 325 TABLET, FILM COATED ORAL at 14:28

## 2020-05-30 ASSESSMENT — ACTIVITIES OF DAILY LIVING (ADL)
ADLS_ACUITY_SCORE: 10

## 2020-05-30 NOTE — PROGRESS NOTES
[facility]  Hospitalist Progress Note     When I evaluated patient: 05/30/2020  Assessment & Plan      Jcarlos Maravilla is a 24 year old male with a past medical history including IV heroin, who was initially hospitalization at [facility] 5/3-5/4 with febrile illness associated with IV heroin usage. However, after discharge, blood cultures became positive for MSSA and pt was subsequently directly admitted to [facility] 5/5/2020 for further workup including SKYE.     MSSA bacteremia, suspect related to IV drug use.  * Initially presented to [facility] evening 5/3/2020 following encouragement from police officers to seek evaluation following use of heroin with his friend, who overdosed. On initial evaluation there, was febrile, with signs of sepsis with elevated lactic acid, procalcitonin 1.22. Subsequently admitted and treated with broad antibiotics. COVID-19 PCR 5/4 negative. TTE 5/4 negative for vegetation. Sepsis resolved and pt was discharged 5/4 on Augmentin (for thrombophlebitis). However, on 5/5, blood cultures from 5/3 came back positive for MSSA and pt sent to [facility] ED for repeat BC's and subsequently directly admitted to [facility] 5/5.   * Started on cefazolin on admit 5/5. SKYE 5/6 negative for vegetations. ID consulted. BC's from 5/5, 5/6 and 05/07 NGTD.  - Cefazolin was started 5/5, changed to vancomycin due to the rash - plan is 4 weeks of IV antibiotics (stop date 6/2/2020); will remain hospitalized here to complete the course as no accepting TCU  found   - has peripheral line in place  - Monitor cultures.  Negative so far.     Superficial occlusive thrombophlebitis of right median cubital vein.  * Diagnosed by US at [facility] 5/4. Was started on Eliquis at [facility] 5/4 and discharged on Augmentin. Did not fill scripts yet after discharge.  * Thrombophlebitis improved despite not continuing Eliquis after recent discharge. Improved after admission off anticoagulation.  - Continue off  anticoagulation.  - Monitor site of thrombophlebitis, if worsens, could consider restarting AC.  - On antibiotics as above.     IVDU - heroin.  * Patient reported using IV heroin use daily. He started using heroin 2 years ago and prefers IV injections. He admits to using around 1-2 gm daily. He had an accidental overdose 3/2020 requiring 12 mg of narcan. Patients father was a known drug user and  of an overdose. He reportedly was working on getting enrolled in [facility] [employer] in [place] but had been waiting due to lack of insurance.   * On admit, patient expressed interest in Suboxone. Seen by Psychiatry consulted  and started on Suboxone.  - Psych reconsulted on - currently on Subaxone 1 film TID, plan continue at discharge, consult Psychiatry prior to discharge to order medication at discharge, appreciate help    - Clonidine changed from prn to 0.1 mg po at bedtime   - Continue PRN loperamide.  - Follow-up CD treatment after discharge.     Anemia, suspect chronic disease.  Pt anemic 10-11 range noted during recent hospitalization. No obvious signs of blood loss. Hgb 11.0 on admit . 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   - Monitor CBC.  - Consider prbc transfusion if hgb </= 7.0 or if significant bleeding with hemodynamic instability or if symptomatic.  -His peripheral smear is suggesting a possible hemoglobinopathy, hgb is improving, would reassess after infection is treated as outpatient.      Nicotine dependence  Patient reported smoking 1.5 PPD. Started on nicotine patch.  - Continue nicotine 21 mg patch.     Drug rash- to ancef  - noted to have a rash on his forearms bilateral and left sided chest, the rash is itchy  - suspected from IV abx ancef. Added to allergy list.  - cortisone cream TID prn, Benadryl po prn  - abx change to vancomycin with improvement     Toothache:  Intermittent. No swelling of gums. Controlled with PRN tylenol. If  swelling or worsening pain, needs dental eval. Has not had dental cleaning for over a year. Not indicated now.      Diet: Regular Diet Adult    Prophylaxis: PCD's, ambulation.   Cook Catheter: not present  Code Status: Full Code       Disposition Plan     Expected discharge: after 6/2/2020, recommended to prior living arrangement after completion of IV antibiotics (home not an option given IV drug use; no accepting TCU found given IV drug use).    Interval History    -- No acute events overnight, c/o tooth ache intermittent mild relieved with tylenol, Rt lower 2nd molar. No swelling. No neck pain.     -Data reviewed today: I reviewed all new labs  over the last 24 hours. I personally reviewed no images or EKG's today.    Physical Exam   Heart Rate: 77, Blood pressure 116/73, pulse 76, temperature 98.2  F (36.8  C), temperature source Oral, resp. rate 14, weight 104.3 kg (230 lb), SpO2 97 %.  Vitals:    05/26/20 0557 05/27/20 0611 05/30/20 0643   Weight: 102.9 kg (226 lb 12.8 oz) 103.9 kg (229 lb) 104.3 kg (230 lb)     Vital Signs with Ranges  Temp:  [98.2  F (36.8  C)-98.3  F (36.8  C)] 98.2  F (36.8  C)  Pulse:  [76-88] 76  Heart Rate:  [77] 77  Resp:  [14-16] 14  BP: (108-116)/(66-73) 116/73  SpO2:  [96 %-99 %] 97 %  I/O's Last 24 hours  I/O last 3 completed shifts:  In: 900 [P.O.:900]  Out: -     Constitutional: No apparent distress  HEENT: PERRLA EOMI. No gum swelling.   Respiratory: CTA b/l. Normal work of breathing  Cardiovascular: Regular rate and rhythm, normal S1 and S2, and no murmur noted  GI: Normal bowel sounds, soft, non-distended, non-tender  Skin/Integumen:  no cyanosis, no edema  Other: Normal mood and affect    Medications   All medications were reviewed.      buprenorphine HCl-naloxone HCl  1 Film Sublingual TID     cloNIDine  0.1 mg Oral At Bedtime     nicotine  1 patch Transdermal Daily     nicotine   Transdermal Q8H     sodium chloride (PF)  10 mL Intracatheter Q8H     sodium chloride (PF)  3  mL Intracatheter Q8H     vancomycin (VANCOCIN) IV  1,500 mg Intravenous Q12H        Data   Recent Labs   Lab 05/29/20  1402 05/26/20  0717   WBC  --  6.6   HGB  --  12.8*   MCV  --  56*   PLT  --  250   NA  --  135   POTASSIUM  --  4.0   CHLORIDE  --  103   CO2  --  28   BUN  --  12   CR 0.75 0.68   ANIONGAP  --  4   MAGALI  --  9.2   GLC  --  88       No results found for this or any previous visit (from the past 24 hour(s)).

## 2020-05-30 NOTE — PROGRESS NOTES
" in to see pt, notified him that pt did c/o \"lower tooth pain\" and that he takes tylenol for this.   in to evaluate pt.  No new orders given will monitor pt.   "

## 2020-05-30 NOTE — PLAN OF CARE
Pt is A&O x4. VSS on RA, afebrile, denies pain. Up independently in the room. PRN benadryl given at HS for diffuse rash on the abdomen. Progressing well per plan of care.

## 2020-05-30 NOTE — PROVIDER NOTIFICATION
"Pt is c/o increased tooth pain.  Pt has been getting Tylenol 650 regularly.  Pt states that the pain \"comes and goes but I do notice and increase since yesterday.\"  Web based paged  about pt's increased tooth pain.  Awaiting call back.  Paged again, awaiting call back.   See new Dr's orders will give pt Ibuprofen.     "

## 2020-05-30 NOTE — PLAN OF CARE
Date/Time: 5/29     Trauma/Ortho/Medical (Choose one) Medical    Diagnosis:bacteremia  POD#:n/a  Mental Status:A&OX4  Activity/dangle Ind  Diet:Regular  Pain: none  Cook/Voiding:BR  Tele/Restraints/Iso:  02/LDA:New IV, vaco   D/C Date:TBD  Other Info:independent in room, has tooth ache takes tylenol.

## 2020-05-31 LAB
CREAT SERPL-MCNC: 0.7 MG/DL (ref 0.66–1.25)
GFR SERPL CREATININE-BSD FRML MDRD: >90 ML/MIN/{1.73_M2}

## 2020-05-31 PROCEDURE — 85027 COMPLETE CBC AUTOMATED: CPT | Performed by: INTERNAL MEDICINE

## 2020-05-31 PROCEDURE — 25800030 ZZH RX IP 258 OP 636: Performed by: INTERNAL MEDICINE

## 2020-05-31 PROCEDURE — 82565 ASSAY OF CREATININE: CPT | Performed by: INTERNAL MEDICINE

## 2020-05-31 PROCEDURE — 25000128 H RX IP 250 OP 636: Performed by: INTERNAL MEDICINE

## 2020-05-31 PROCEDURE — 25000132 ZZH RX MED GY IP 250 OP 250 PS 637: Performed by: INTERNAL MEDICINE

## 2020-05-31 PROCEDURE — 25000132 ZZH RX MED GY IP 250 OP 250 PS 637: Performed by: PSYCHIATRY & NEUROLOGY

## 2020-05-31 PROCEDURE — 25000132 ZZH RX MED GY IP 250 OP 250 PS 637: Performed by: NURSE PRACTITIONER

## 2020-05-31 PROCEDURE — 12000000 ZZH R&B MED SURG/OB

## 2020-05-31 PROCEDURE — 99232 SBSQ HOSP IP/OBS MODERATE 35: CPT | Performed by: HOSPITALIST

## 2020-05-31 PROCEDURE — 36415 COLL VENOUS BLD VENIPUNCTURE: CPT | Performed by: INTERNAL MEDICINE

## 2020-05-31 PROCEDURE — 25000132 ZZH RX MED GY IP 250 OP 250 PS 637: Performed by: HOSPITALIST

## 2020-05-31 RX ADMIN — Medication 1 MG: at 00:25

## 2020-05-31 RX ADMIN — BUPRENORPHINE HYDROCHLORIDE, NALOXONE HYDROCHLORIDE 1 FILM: 4; 1 FILM, SOLUBLE BUCCAL; SUBLINGUAL at 08:19

## 2020-05-31 RX ADMIN — IBUPROFEN 600 MG: 600 TABLET ORAL at 06:58

## 2020-05-31 RX ADMIN — VANCOMYCIN HYDROCHLORIDE 1250 MG: 5 INJECTION, POWDER, LYOPHILIZED, FOR SOLUTION INTRAVENOUS at 21:06

## 2020-05-31 RX ADMIN — CLONIDINE HYDROCHLORIDE 0.1 MG: 0.1 TABLET ORAL at 21:22

## 2020-05-31 RX ADMIN — IBUPROFEN 600 MG: 600 TABLET ORAL at 21:14

## 2020-05-31 RX ADMIN — DIPHENHYDRAMINE HYDROCHLORIDE 25 MG: 25 CAPSULE ORAL at 00:25

## 2020-05-31 RX ADMIN — ACETAMINOPHEN 650 MG: 325 TABLET, FILM COATED ORAL at 06:58

## 2020-05-31 RX ADMIN — ACETAMINOPHEN 650 MG: 325 TABLET, FILM COATED ORAL at 13:43

## 2020-05-31 RX ADMIN — VANCOMYCIN HYDROCHLORIDE 1250 MG: 5 INJECTION, POWDER, LYOPHILIZED, FOR SOLUTION INTRAVENOUS at 12:02

## 2020-05-31 RX ADMIN — NICOTINE 1 PATCH: 21 PATCH, EXTENDED RELEASE TRANSDERMAL at 21:14

## 2020-05-31 RX ADMIN — ACETAMINOPHEN 650 MG: 325 TABLET, FILM COATED ORAL at 00:25

## 2020-05-31 RX ADMIN — ACETAMINOPHEN 650 MG: 325 TABLET, FILM COATED ORAL at 18:10

## 2020-05-31 RX ADMIN — IBUPROFEN 600 MG: 600 TABLET ORAL at 13:43

## 2020-05-31 RX ADMIN — ACETAMINOPHEN 650 MG: 325 TABLET, FILM COATED ORAL at 22:47

## 2020-05-31 RX ADMIN — VANCOMYCIN HYDROCHLORIDE 1250 MG: 5 INJECTION, POWDER, LYOPHILIZED, FOR SOLUTION INTRAVENOUS at 05:01

## 2020-05-31 RX ADMIN — NICOTINE 1 PATCH: 21 PATCH, EXTENDED RELEASE TRANSDERMAL at 08:19

## 2020-05-31 RX ADMIN — BUPRENORPHINE HYDROCHLORIDE, NALOXONE HYDROCHLORIDE 1 FILM: 4; 1 FILM, SOLUBLE BUCCAL; SUBLINGUAL at 21:06

## 2020-05-31 RX ADMIN — BUPRENORPHINE HYDROCHLORIDE, NALOXONE HYDROCHLORIDE 1 FILM: 4; 1 FILM, SOLUBLE BUCCAL; SUBLINGUAL at 16:21

## 2020-05-31 RX ADMIN — IBUPROFEN 600 MG: 600 TABLET ORAL at 00:25

## 2020-05-31 ASSESSMENT — ACTIVITIES OF DAILY LIVING (ADL)
ADLS_ACUITY_SCORE: 10

## 2020-05-31 NOTE — PLAN OF CARE
A&Ox4. VSS on RA. Up independently. Regular diet, tolerating well. L PIV SL w/ int vanco. Denies N/V. Nicotine patch on R shoulder. Scratches and bruising on BLE. Rash resolved. Pt can call appropriately. Plan to discharge 6/02 when vanco course is completed. C/o of R lower tooth pain, gave tylenol and ibuprofen X1, relief found. Has follow up apt scheduled with dentist for tooth pain/caries. Continue to monitor.

## 2020-05-31 NOTE — PHARMACY-VANCOMYCIN DOSING SERVICE
Pharmacy Vancomycin Note  Date of Service May 30, 2020  Patient's  1995   24 year old, male    Indication: Bacteremia  Goal Trough Level: 10-15 mg/L  Day of Therapy: 18  Current Vancomycin regimen:  1500 mg IV q12h    Current estimated CrCl = Estimated Creatinine Clearance: 193 mL/min (based on SCr of 0.75 mg/dL).    Creatinine for last 3 days  2020:  2:02 PM Creatinine 0.75 mg/dL    Recent Vancomycin Levels (past 3 days)  2020:  2:02 PM Vancomycin Level 17.7 mg/L  2020:  7:57 PM Vancomycin Level 6.4 mg/L    Vancomycin IV Administrations (past 72 hours)                   vancomycin 1500 mg in 0.9% NaCl 250 ml intermittent infusion 1,500 mg (mg) 1,500 mg Given 20 2047     1,500 mg Given  0830     1,500 mg Given 20 2113    vancomycin 1500 mg in 0.9% NaCl 250 ml intermittent infusion 1,500 mg (mg) 1,500 mg Given 20 0823     1,500 mg Given 20 2335     1,500 mg Given  1422     1,500 mg Given  0511                Nephrotoxins and other renal medications (From now, onward)    Start     Dose/Rate Route Frequency Ordered Stop    20 0447  vancomycin 1250 mg in 0.9% NaCl 250 mL intermittent infusion 1,250 mg      1,250 mg  over 90 Minutes Intravenous EVERY 8 HOURS 20 2110      20 1723  ibuprofen (ADVIL/MOTRIN) tablet 600 mg      600 mg Oral EVERY 6 HOURS PRN 20 1724               Contrast Orders - past 72 hours (72h ago, onward)    None          Interpretation of levels and current regimen:  Trough level is  Subtherapeutic    Has serum creatinine changed > 50% in last 72 hours: No    Urine output:  good urine output    Renal Function: Stable    Plan:  1.  Increase Dose to 1250mg q 8h  2.  Pharmacy will check trough levels as appropriate in 1-3 Days.    3. Serum creatinine levels will be ordered a minimum of twice weekly.      Rosette Powell, PharmD        .

## 2020-05-31 NOTE — PROGRESS NOTES
[facility]  Hospitalist Progress Note     When I evaluated patient: 05/31/2020  Assessment & Plan      Jcarlos Maravilla is a 24 year old male with a past medical history including IV heroin, who was initially hospitalization at [facility] 5/3-5/4 with febrile illness associated with IV heroin usage. However, after discharge, blood cultures became positive for MSSA and pt was subsequently directly admitted to [facility] 5/5/2020 for further workup including SKYE.     MSSA bacteremia, suspect related to IV drug use.  * Initially presented to [facility] evening 5/3/2020 following encouragement from police officers to seek evaluation following use of heroin with his friend, who overdosed. On initial evaluation there, was febrile, with signs of sepsis with elevated lactic acid, procalcitonin 1.22. Subsequently admitted and treated with broad antibiotics. COVID-19 PCR 5/4 negative. TTE 5/4 negative for vegetation. Sepsis resolved and pt was discharged 5/4 on Augmentin (for thrombophlebitis). However, on 5/5, blood cultures from 5/3 came back positive for MSSA and pt sent to [facility] ED for repeat BC's and subsequently directly admitted to [facility] 5/5.   * Started on cefazolin on admit 5/5. SKYE 5/6 negative for vegetations. ID consulted. BC's from 5/5, 5/6 and 05/07 NGTD.  - Cefazolin was started 5/5, changed to vancomycin due to the rash - plan is 4 weeks of IV antibiotics (stop date 6/2/2020); will remain hospitalized here to complete the course as no accepting TCU  found   - has peripheral line in place  - Cultures negative      Superficial occlusive thrombophlebitis of right median cubital vein.  * Diagnosed by US at [facility] 5/4. Was started on Eliquis at [facility] 5/4 and discharged on Augmentin. Did not fill scripts yet after discharge.  * Thrombophlebitis improved despite not continuing Eliquis after recent discharge. Improved after admission off anticoagulation.  - Continue off anticoagulation.  -  Monitor site of thrombophlebitis, if worsens, could consider restarting AC.  - On antibiotics as above.     IVDU - heroin.  * Patient reported using IV heroin use daily. He started using heroin 2 years ago and prefers IV injections. He admits to using around 1-2 gm daily. He had an accidental overdose 3/2020 requiring 12 mg of narcan. Patients father was a known drug user and  of an overdose. He reportedly was working on getting enrolled in [facility] [employer] in [place] but had been waiting due to lack of insurance.   * On admit, patient expressed interest in Suboxone. Seen by Psychiatry consulted  and started on Suboxone.  - Psych reconsulted on - currently on Subaxone 1 film TID, plan continue at discharge, consult Psychiatry prior to discharge to order medication at discharge, appreciate help    - Clonidine changed from prn to 0.1 mg po at bedtime   - Continue PRN loperamide.  - Follow-up CD treatment after discharge.     Anemia, suspect chronic disease.  Pt anemic 10-11 range noted during recent hospitalization. No obvious signs of blood loss. Hgb 11.0 on admit . 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   - Monitor CBC.  - Consider prbc transfusion if hgb </= 7.0 or if significant bleeding with hemodynamic instability or if symptomatic.  -His peripheral smear is suggesting a possible hemoglobinopathy, hgb is improving, would reassess after infection is treated as outpatient.      Nicotine dependence  Patient reported smoking 1.5 PPD. Started on nicotine patch.  - Continue nicotine 21 mg patch.     Drug rash- to ancef  - noted to have a rash on his forearms bilateral and left sided chest, the rash is itchy  - suspected from IV abx ancef. Added to allergy list.  - cortisone cream TID prn, Benadryl po prn  - abx change to vancomycin with improvement     Toothache:  Intermittent. No swelling of gums but both right lower tooth are tender to press, has  "caries and also feeling on one.  -Dental consult for evaluation tomorrow.       Diet: Regular Diet Adult    Prophylaxis: PCD's, ambulation.   Cook Catheter: not present  Code Status: Full Code       Disposition Plan     Expected discharge: after 6/2/2020, recommended to prior living arrangement after completion of IV antibiotics (home not an option given IV drug use; no accepting TCU found given IV drug use).    Interval History    -- Toothache (Rt lower second molar) controlled with ibuprofen and Tylenol.  Son reports he has been experiencing this pain for almost a week but it is only on and off and \"not that bad\"    -Data reviewed today: I reviewed all new labs  over the last 24 hours. I personally reviewed no images or EKG's today.    Physical Exam   Heart Rate: 79, Blood pressure 116/67, pulse 71, temperature 97.9  F (36.6  C), temperature source Oral, resp. rate 16, weight 104.3 kg (230 lb), SpO2 95 %.  Vitals:    05/26/20 0557 05/27/20 0611 05/30/20 0643   Weight: 102.9 kg (226 lb 12.8 oz) 103.9 kg (229 lb) 104.3 kg (230 lb)     Vital Signs with Ranges  Temp:  [97.9  F (36.6  C)-98.3  F (36.8  C)] 97.9  F (36.6  C)  Pulse:  [71-73] 71  Heart Rate:  [68-81] 79  Resp:  [16-18] 16  BP: (112-132)/(67-89) 116/67  SpO2:  [95 %-98 %] 95 %  I/O's Last 24 hours  I/O last 3 completed shifts:  In: 490 [P.O.:240; I.V.:250]  Out: -     Constitutional: No apparent distress  HEENT: PERRLA EOMI. No gum swelling. Rt lower molar tooth 1 and 2 are with caries, has filling on the second one. And is mildly tender to press.   Respiratory: CTA b/l. Normal work of breathing  Cardiovascular: Regular rate and rhythm, normal S1 and S2, and no murmur noted  GI: Normal bowel sounds, soft, non-distended, non-tender  Skin/Integumen:  no cyanosis, no edema  Other: Normal mood and affect    Medications   All medications were reviewed.      buprenorphine HCl-naloxone HCl  1 Film Sublingual TID     cloNIDine  0.1 mg Oral At Bedtime     " famotidine  20 mg Oral BID AC     nicotine  1 patch Transdermal Daily     nicotine   Transdermal Q8H     vancomycin (VANCOCIN) IV  1,250 mg Intravenous Q8H        Data   Recent Labs   Lab 05/31/20  0617 05/29/20  1402 05/26/20  0717   WBC  --   --  6.6   HGB  --   --  12.8*   MCV  --   --  56*   PLT  --   --  250   NA  --   --  135   POTASSIUM  --   --  4.0   CHLORIDE  --   --  103   CO2  --   --  28   BUN  --   --  12   CR 0.70 0.75 0.68   ANIONGAP  --   --  4   MAGALI  --   --  9.2   GLC  --   --  88       No results found for this or any previous visit (from the past 24 hour(s)).

## 2020-05-31 NOTE — PLAN OF CARE
Date/Time: 5/29 days/eves    Trauma/Ortho/Medical (Choose one) Medical    Diagnosis:bacteremia  POD#:n/a  Mental Status:A&OX4  Activity/dangle Ind  Diet:Regular  Pain: lower tooth pain, taking tylenol and Ibuprofen.  Cook/Voiding:BR  Tele/Restraints/Iso:  02/LDA:New IV, vaco   D/C Date:TBD  Other Info:independent in room

## 2020-06-01 LAB
CREAT SERPL-MCNC: 0.68 MG/DL (ref 0.66–1.25)
ERYTHROCYTE [DISTWIDTH] IN BLOOD BY AUTOMATED COUNT: 16.3 % (ref 10–15)
ERYTHROCYTE [DISTWIDTH] IN BLOOD BY AUTOMATED COUNT: NORMAL % (ref 10–15)
GFR SERPL CREATININE-BSD FRML MDRD: >90 ML/MIN/{1.73_M2}
HCT VFR BLD AUTO: 38.3 % (ref 40–53)
HCT VFR BLD AUTO: NORMAL % (ref 40–53)
HGB BLD-MCNC: 11.7 G/DL (ref 13.3–17.7)
HGB BLD-MCNC: NORMAL G/DL (ref 13.3–17.7)
MCH RBC QN AUTO: 17.1 PG (ref 26.5–33)
MCH RBC QN AUTO: NORMAL PG (ref 26.5–33)
MCHC RBC AUTO-ENTMCNC: 30.5 G/DL (ref 31.5–36.5)
MCHC RBC AUTO-ENTMCNC: NORMAL G/DL (ref 31.5–36.5)
MCV RBC AUTO: 56 FL (ref 78–100)
MCV RBC AUTO: NORMAL FL (ref 78–100)
PLATELET # BLD AUTO: 226 10E9/L (ref 150–450)
PLATELET # BLD AUTO: NORMAL 10E9/L (ref 150–450)
RBC # BLD AUTO: 6.83 10E12/L (ref 4.4–5.9)
RBC # BLD AUTO: NORMAL 10E12/L (ref 4.4–5.9)
VANCOMYCIN SERPL-MCNC: 12.6 MG/L
WBC # BLD AUTO: 6.3 10E9/L (ref 4–11)
WBC # BLD AUTO: NORMAL 10E9/L (ref 4–11)

## 2020-06-01 PROCEDURE — 25000132 ZZH RX MED GY IP 250 OP 250 PS 637: Performed by: NURSE PRACTITIONER

## 2020-06-01 PROCEDURE — 80202 ASSAY OF VANCOMYCIN: CPT | Performed by: INTERNAL MEDICINE

## 2020-06-01 PROCEDURE — 85027 COMPLETE CBC AUTOMATED: CPT | Performed by: INTERNAL MEDICINE

## 2020-06-01 PROCEDURE — 25000128 H RX IP 250 OP 636: Performed by: INTERNAL MEDICINE

## 2020-06-01 PROCEDURE — 36415 COLL VENOUS BLD VENIPUNCTURE: CPT | Performed by: INTERNAL MEDICINE

## 2020-06-01 PROCEDURE — 25000132 ZZH RX MED GY IP 250 OP 250 PS 637: Performed by: INTERNAL MEDICINE

## 2020-06-01 PROCEDURE — 25000132 ZZH RX MED GY IP 250 OP 250 PS 637: Performed by: HOSPITALIST

## 2020-06-01 PROCEDURE — 99232 SBSQ HOSP IP/OBS MODERATE 35: CPT | Mod: 95 | Performed by: PSYCHIATRY & NEUROLOGY

## 2020-06-01 PROCEDURE — 12000000 ZZH R&B MED SURG/OB

## 2020-06-01 PROCEDURE — 25800030 ZZH RX IP 258 OP 636: Performed by: INTERNAL MEDICINE

## 2020-06-01 PROCEDURE — 25000132 ZZH RX MED GY IP 250 OP 250 PS 637: Performed by: PSYCHIATRY & NEUROLOGY

## 2020-06-01 PROCEDURE — 99232 SBSQ HOSP IP/OBS MODERATE 35: CPT | Performed by: INTERNAL MEDICINE

## 2020-06-01 PROCEDURE — 82565 ASSAY OF CREATININE: CPT | Performed by: INTERNAL MEDICINE

## 2020-06-01 RX ORDER — BUPRENORPHINE AND NALOXONE 4; 1 MG/1; MG/1
1 FILM, SOLUBLE BUCCAL; SUBLINGUAL 3 TIMES DAILY
Status: ACTIVE | OUTPATIENT
Start: 2020-06-01 | End: 2020-06-01

## 2020-06-01 RX ORDER — BUPRENORPHINE AND NALOXONE 8; 2 MG/1; MG/1
1 FILM, SOLUBLE BUCCAL; SUBLINGUAL 2 TIMES DAILY
Status: DISCONTINUED | OUTPATIENT
Start: 2020-06-01 | End: 2020-06-03 | Stop reason: HOSPADM

## 2020-06-01 RX ADMIN — BUPRENORPHINE HYDROCHLORIDE, NALOXONE HYDROCHLORIDE 1 FILM: 4; 1 FILM, SOLUBLE BUCCAL; SUBLINGUAL at 08:01

## 2020-06-01 RX ADMIN — IBUPROFEN 600 MG: 600 TABLET ORAL at 05:55

## 2020-06-01 RX ADMIN — DIPHENHYDRAMINE HYDROCHLORIDE 25 MG: 25 CAPSULE ORAL at 00:27

## 2020-06-01 RX ADMIN — ACETAMINOPHEN 650 MG: 325 TABLET, FILM COATED ORAL at 05:56

## 2020-06-01 RX ADMIN — BUPRENORPHINE HYDROCHLORIDE, NALOXONE HYDROCHLORIDE 1 FILM: 8; 2 FILM, SOLUBLE BUCCAL; SUBLINGUAL at 21:19

## 2020-06-01 RX ADMIN — VANCOMYCIN HYDROCHLORIDE 1250 MG: 5 INJECTION, POWDER, LYOPHILIZED, FOR SOLUTION INTRAVENOUS at 05:51

## 2020-06-01 RX ADMIN — ACETAMINOPHEN 650 MG: 325 TABLET, FILM COATED ORAL at 19:25

## 2020-06-01 RX ADMIN — VANCOMYCIN HYDROCHLORIDE 1250 MG: 5 INJECTION, POWDER, LYOPHILIZED, FOR SOLUTION INTRAVENOUS at 13:44

## 2020-06-01 RX ADMIN — IBUPROFEN 600 MG: 600 TABLET ORAL at 13:51

## 2020-06-01 RX ADMIN — VANCOMYCIN HYDROCHLORIDE 1250 MG: 5 INJECTION, POWDER, LYOPHILIZED, FOR SOLUTION INTRAVENOUS at 21:19

## 2020-06-01 RX ADMIN — ACETAMINOPHEN 650 MG: 325 TABLET, FILM COATED ORAL at 13:51

## 2020-06-01 RX ADMIN — CLONIDINE HYDROCHLORIDE 0.1 MG: 0.1 TABLET ORAL at 21:19

## 2020-06-01 RX ADMIN — NICOTINE 1 PATCH: 21 PATCH, EXTENDED RELEASE TRANSDERMAL at 08:00

## 2020-06-01 RX ADMIN — Medication 1 MG: at 00:27

## 2020-06-01 ASSESSMENT — ACTIVITIES OF DAILY LIVING (ADL)
ADLS_ACUITY_SCORE: 10

## 2020-06-01 NOTE — PROGRESS NOTES
SW:  D: Call placed to [facility] regarding bed availability 6/3/20 when patient's antibiotics are completed. [facility] called back and stated that patient will be admitted to [facility] on 6/8/20. Spoke to patient who would like to be discharged when antibiotics are done to go home and get belongings to be admitted to treatment on the 8th. He asked about the status of his MA. Email sent to Schuyler in  to ask if he knows status.    P: Will continue to follow    DELANO Mckeon    [facility]

## 2020-06-01 NOTE — PHARMACY-VANCOMYCIN DOSING SERVICE
Pharmacy Vancomycin Note  Date of Service 2020  Patient's  1995   24 year old, male    Indication: Bacteremia  Goal Trough Level: 10-15 mg/L  Day of Therapy: 20  Current Vancomycin regimen:  1250 mg IV q8h    Current estimated CrCl = Estimated Creatinine Clearance: 214 mL/min (based on SCr of 0.68 mg/dL).    Creatinine for last 3 days  2020:  2:02 PM Creatinine 0.75 mg/dL  2020:  6:17 AM Creatinine 0.70 mg/dL  2020:  7:35 AM Creatinine 0.68 mg/dL    Recent Vancomycin Levels (past 3 days)  2020:  2:02 PM Vancomycin Level 17.7 mg/L  2020:  7:57 PM Vancomycin Level 6.4 mg/L  2020: 11:54 AM Vancomycin Level 12.6 mg/L    Vancomycin IV Administrations (past 72 hours)                   vancomycin 1250 mg in 0.9% NaCl 250 mL intermittent infusion 1,250 mg (mg) 1,250 mg Given 20 0551     1,250 mg Given 20 2106     1,250 mg Given  1202     1,250 mg Given  0501    vancomycin 1500 mg in 0.9% NaCl 250 ml intermittent infusion 1,500 mg (mg) 1,500 mg Given 207     1,500 mg Given  0830     1,500 mg Given 20 2113                Nephrotoxins and other renal medications (From now, onward)    Start     Dose/Rate Route Frequency Ordered Stop    20 0447  vancomycin 1250 mg in 0.9% NaCl 250 mL intermittent infusion 1,250 mg      1,250 mg  over 90 Minutes Intravenous EVERY 8 HOURS 20 1723  ibuprofen (ADVIL/MOTRIN) tablet 600 mg      600 mg Oral EVERY 6 HOURS PRN 20 1724               Contrast Orders - past 72 hours (72h ago, onward)    None          Interpretation of levels and current regimen:  Trough level is  Therapeutic    Has serum creatinine changed > 50% in last 72 hours: No    Urine output:  good urine output    Renal Function: Stable    Plan:  1.  Continue Current Dose  2.  Pharmacy will check trough levels as appropriate in 1-3 Days.    3. Serum creatinine levels will be ordered daily for the first week of therapy and at  least twice weekly for subsequent weeks.      Jayne Joshi, PharmD        .

## 2020-06-01 NOTE — CONSULTS
[facility]  Psychiatry Consultation - Follow-up note      Interim History:     Patient seen for follow-up today to assist in managing his Suboxone prescription.    On examination today, the patient reports that he is overall doing better, noting a reduction in cravings for opiates and reduction of opiate withdrawal symptoms.  He continues to note mild cravings and general malaise which he attributes to mild lingering withdrawal.  In the past, he has better tolerated 8 mg twice a day with reduction of these residual symptoms.  No side effects or sedation to the current dose reported.    His mood is stable without significant depressed mood or anxiety.  No psychotic symptoms reported.  No suicidal or homicidal thoughts reported.    He reviewed with me his plan to maintain sobriety: He anticipates completing antibiotic treatment by Wednesday morning at which time he anticipates discharge from the hospital.  He is planning on gaining [facility] to [facility] for [facility] treatment on June 8 and is looking forward to this admission.  He will spend the remainder of the 5 days with his grandmother, describing her living environment as being supportive and sober.  She will then transport him to [facility] on the date of admission.         Medications:       buprenorphine HCl-naloxone HCl  1 Film Sublingual TID     cloNIDine  0.1 mg Oral At Bedtime     famotidine  20 mg Oral BID AC     nicotine  1 patch Transdermal Daily     nicotine   Transdermal Q8H     vancomycin (VANCOCIN) IV  1,250 mg Intravenous Q8H          Allergies:     Allergies   Allergen Reactions     No Known Drug Allergies      Ancef [Cefazolin] Rash          Labs:     Recent Results (from the past 24 hour(s))   Creatinine    Collection Time: 06/01/20  7:35 AM   Result Value Ref Range    Creatinine 0.68 0.66 - 1.25 mg/dL    GFR Estimate >90 >60 mL/min/[1.73_m2]    GFR Estimate If Black >90 >60  mL/min/[1.73_m2]   Vancomycin level    Collection Time: 06/01/20 11:54 AM   Result Value Ref Range    Vancomycin Level 12.6 mg/L   CBC with platelets    Collection Time: 06/01/20  1:35 PM   Result Value Ref Range    WBC 6.3 4.0 - 11.0 10e9/L    RBC Count 6.83 (H) 4.4 - 5.9 10e12/L    Hemoglobin 11.7 (L) 13.3 - 17.7 g/dL    Hematocrit 38.3 (L) 40.0 - 53.0 %    MCV 56 (L) 78 - 100 fl    MCH 17.1 (L) 26.5 - 33.0 pg    MCHC 30.5 (L) 31.5 - 36.5 g/dL    RDW 16.3 (H) 10.0 - 15.0 %    Platelet Count PENDING 150 - 450 10e9/L          Psychiatric Examination:     /70   Pulse 84   Temp 98  F (36.7  C) (Oral)   Resp 16   Wt 105.7 kg (233 lb 1.6 oz)   SpO2 95%   BMI 31.61 kg/m    Weight is 233 lbs 1.6 oz  Body mass index is 31.61 kg/m .  Orthostatic Vitals     None            Appearance: awake, alert  Attitude:  cooperative  Eye Contact:  fair  Mood:  better  Affect:  appropriate and in normal range  Speech:  clear, coherent  Psychomotor Behavior:  no evidence of tardive dyskinesia, dystonia, or tics  Throught Process:  logical and linear  Associations:  no loose associations  Thought Content:  no evidence of suicidal ideation or homicidal ideation and no evidence of psychotic thought  Insight:  fair  Judgement:  intact  Oriented to:  time, person, and place  Attention Span and Concentration:  fair  Recent and Remote Memory:  fair           DIagnoses:     Opiate use disorder, on medication assisted treatment  MSSA bacteremia      Recommendations:     Increase the dose of Suboxone to 8 mg twice a day to achieve his targeted maintenance dose for management of his opiate use disorder.  I have asked the patient to call Dr. Pepper's clinic at [phone] to schedule a follow-up appointment for Suboxone maintenance treatment (as noted in his last consultation note).  Once he has scheduled this appointment, I will know how much Suboxone to prescribe for him to bridge him until that appointment.  I will plan to follow-up  tomorrow to gather this information and initiate his Suboxone prescription.    Unit social work notes indicate that disposition is being coordinated with [facility] for residential treatment, anticipating admission either on the date of discharge from hospital or sometime next week.    Please reconsult with Psychiatry sooner if needed.  Karl Raymundo MD   Text Page      Visit/Communication Style   VIRTUAL (VIDEO) communication was used to evaluate Jcarlos due to the COVID pandemic.    Jcarlos consented to the use of video communication: yes  Video START time: 2:20 PM, 6/1/2020  Video STOP time: 2:38 PM, 6/1/2020   Patient's location: [facility]   Provider's location during the visit: Home

## 2020-06-01 NOTE — PROGRESS NOTES
[facility]    Infectious Disease Progress Note    Date of Service (when I saw the patient): 05/14/2020     Assessment & Plan   Jcarlos Maravilla is a 24 year old male who was admitted on 5/5/2020.     Impression:  1. 24 y.o male with IV heroin use.   2. Admitted with right antecubital superficial thrombophlebitis.   3. Bacteremic with MSSA. Only 1/4 cultures.   4. On ancef.   5. TTE negative.      Recommendations:   1. Given MSSA bacteremia anticipate 4 weeks total of IV antibiotics  day 26/28 today.           Bekah Ackerman MD    Interval History      No new complaints     Physical Exam   Temp: 98  F (36.7  C) Temp src: Oral BP: 117/70 Pulse: 84 Heart Rate: 75 Resp: 16 SpO2: 95 % O2 Device: None (Room air)    Vitals:    05/27/20 0611 05/30/20 0643 06/01/20 0720   Weight: 103.9 kg (229 lb) 104.3 kg (230 lb) 105.7 kg (233 lb 1.6 oz)     Vital Signs with Ranges  Temp:  [97.9  F (36.6  C)-98.2  F (36.8  C)] 98  F (36.7  C)  Pulse:  [84] 84  Heart Rate:  [75-83] 75  Resp:  [16] 16  BP: (109-118)/(65-70) 117/70  SpO2:  [95 %-96 %] 95 %    Constitutional: Awake, alert, cooperative, no apparent distress  Lungs: Clear to auscultation bilaterally, no crackles or wheezing  Cardiovascular: Regular rate and rhythm, normal S1 and S2, and no murmur noted  Abdomen: Normal bowel sounds, soft, non-distended, non-tender  Skin: Rash on UE and torso   Other:    Medications       buprenorphine HCl-naloxone HCl  1 Film Sublingual TID     cloNIDine  0.1 mg Oral At Bedtime     famotidine  20 mg Oral BID AC     nicotine  1 patch Transdermal Daily     nicotine   Transdermal Q8H     vancomycin (VANCOCIN) IV  1,250 mg Intravenous Q8H       Data   All microbiology laboratory data reviewed.  Recent Labs   Lab Test 05/26/20  0717 05/17/20  0616 05/14/20  0603   WBC 6.6 6.2 5.1   HGB 12.8* 12.4* 12.2*   HCT 40.9 40.4 39.4*   MCV 56* 57* 57*    306 344     Recent Labs   Lab Test 06/01/20  0735 05/31/20  0617 05/29/20  1402   CR  0.68 0.70 0.75     No lab results found.  Recent Labs   Lab Test 05/06/20  2335 05/06/20  1129 05/05/20  1106 05/05/20  1043 05/04/20  0138 05/04/20  0046   CULT No growth No growth No growth No growth No growth Cultured on the 1st day of incubation:  Staphylococcus aureus  *  Critical Value/Significant Value, preliminary result only, called to and read back by  Dr Mims, On call hospitalist, 5/5/20 @ 0239 TF    (Note)  POSITIVE for STAPHYLOCOCCUS AUREUS and NEGATIVE for the mecA gene  (not MRSA) by InSphero multiplex nucleic acid test. The mecA gene was  not detected. Final identification and antimicrobial susceptibility  testing will be verified by standard methods.    Specimen tested with Verigene multiplex, gram-positive blood culture  nucleic acid test for the following targets: Staph aureus, Staph  epidermidis, Staph lugdunensis, other Staph species, Enterococcus  faecalis, Enterococcus faecium, Streptococcus species, S. agalactiae,  S. anginosus grp., S. pneumoniae, S. pyogenes, Listeria sp., mecA  (methicillin resistance) and Rico/B (vancomycin resistance).    Critical Value/Significant Value called to and read back by DR YURI MIMS ON CALL HOSPITALIST [facility] 0603 05.05.20 CF         Attestation:  Total time on the floor involved in the patient's care: 35 minutes. Total time spent in counseling/care coordination: >50%

## 2020-06-01 NOTE — PLAN OF CARE
VSS.  A&Ox4.  Tylenol given for tooth pain x1.  Independent.  PIV SL.  Nicotine patch in place.  Dentist consult was placed this evening.  Plans to discharge 6/2 when vanco course completed.  Will continue to monitor.

## 2020-06-01 NOTE — PROVIDER NOTIFICATION
Dentist consult called and said they no longer come to [facility].  Hospitalist paged to update.

## 2020-06-01 NOTE — PROGRESS NOTES
BRIEF NUTRITION REASSESSMENT    CURRENT DIET AND INTAKE:  Diet: Regular             Intake: Consistent % intakes recorded. Patietn orders adequate meals 2-3x daily.     LABS/MEDS/PHYSICAL FINDINGS:  Reviewed    Pain with dental carries. Dental consult.     MALNUTRITION:  Visual Nutrition Focused Physical Assessment (NFPA) note completed due to departmental precautions to prevent spread of COVID19 during Pandemic.      Do not expect that patient meets two of the criteria for diagnosing malnutrition.    NUTRITION INTERVENTION:  Nutrition Diagnosis:  No nutrition diagnosis at this time.    Implementation:  Nutrition Education: No needs at this time.     FOLLOW UP/MONITORING:   Will re-evaluate in 7 - 10 days, or sooner, if re-consulted.    Mojgan Rodriguez RD, LD  Pager: [phone]

## 2020-06-01 NOTE — PROGRESS NOTES
[facility]    Hospitalist Progress Note    Assessment & Plan   Jcarlos Maravilla is a 24 year old male with a past medical history including IV heroin, who was initially hospitalization at [facility] 5/3-5/4 with febrile illness associated with IV heroin usage. However, after discharge, blood cultures became positive for MSSA and pt was subsequently directly admitted to [facility] 5/5/2020 for further workup including SKYE.     MSSA bacteremia, suspect related to IV drug use.  * Initially presented to [facility] evening 5/3/2020 following encouragement from police officers to seek evaluation following use of heroin with his friend, who overdosed. On initial evaluation there, was febrile, with signs of sepsis with elevated lactic acid, procalcitonin 1.22. Subsequently admitted and treated with broad antibiotics. COVID-19 PCR 5/4 negative. TTE 5/4 negative for vegetation. Sepsis resolved and pt was discharged 5/4 on Augmentin (for thrombophlebitis). However, on 5/5, blood cultures from 5/3 came back positive for MSSA and pt sent to [facility] ED for repeat BC's and subsequently directly admitted to [facility] 5/5.   * Started on cefazolin on admit 5/5. SKYE 5/6 negative for vegetations. ID consulted. BC's from 5/5, 5/6 and 05/07 NGTD.  - Cefazolin was started 5/5, changed to vancomycin due to the rash - plan is 4 weeks of IV antibiotics (stop date 6/2/2020); will remain hospitalized here to complete the course as no accepting TCU  found   - has peripheral line in place  - Cultures negative   -will discuss with ID.   -Cr today nl Vanco level pending  -day 26/28 today of abx     Superficial occlusive thrombophlebitis of right median cubital vein.  * Diagnosed by US at [facility] 5/4. Was started on Eliquis at [facility] 5/4 and discharged on Augmentin. Did not fill scripts yet after discharge.  * Thrombophlebitis improved despite not continuing Eliquis after recent discharge. Improved after admission off  anticoagulation.  - Continue off anticoagulation.  - Monitor site of thrombophlebitis, if worsens, could consider restarting AC.  - On antibiotics as above.     IVDU - heroin.  * Patient reported using IV heroin use daily. He started using heroin 2 years ago and prefers IV injections. He admits to using around 1-2 gm daily. He had an accidental overdose 3/2020 requiring 12 mg of narcan. Patients father was a known drug user and  of an overdose. He reportedly was working on getting enrolled in [employer] in [place] but had been waiting due to lack of insurance.   * On admit, patient expressed interest in Suboxone. Seen by Psychiatry consulted  and started on Suboxone.  - Psych reconsulted on - currently on Subaxone 1 film TID, plan continue at discharge, consult Psychiatry prior to discharge to order medication at discharge, appreciate help  - will place for consult tomorrow.   - Clonidine changed from prn to 0.1 mg po at bedtime   - Continue PRN loperamide.  - Follow-up CD treatment after discharge.  -pcp follw up.      Anemia, suspect chronic disease.  Pt anemic 10-11 range noted during recent hospitalization. No obvious signs of blood loss. Hgb 11.0 on admit . 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   - Monitor CBC.  - Consider prbc transfusion if hgb </= 7.0 or if significant bleeding with hemodynamic instability or if symptomatic.  -His peripheral smear is suggesting a possible hemoglobinopathy, hgb is improving, would reassess after infection is treated as outpatient.      Nicotine dependence  Patient reported smoking 1.5 PPD. Started on nicotine patch.  - Continue nicotine 21 mg patch.     Drug rash- to ancef  - noted to have a rash on his forearms bilateral and left sided chest, the rash is itchy  - suspected from IV abx ancef. Added to allergy list.  - cortisone cream TID prn, Benadryl po prn  - abx change to vancomycin with  improvement   -rash resolved.      Toothache:  Intermittent. No swelling of gums but both right lower tooth are tender to press, has caries and also feeling on one.no change in exam today  -Dental consult for evaluation-  Not come to [facility]   -no fevers.   -mild pain. Has dental appt for this Thursday already scheduled.      Diet: Regular Diet Adult    Prophylaxis: PCD's, ambulation.   Cook Catheter: not present  Code Status: Full Code       Disposition Plan     Expected discharge: after 6/2/2020, recommended to prior living arrangement after completion of IV antibiotics (home not an option given IV drug use; no accepting TCU found given IV drug use).      Pan Pichardo MD  Text Page  (7am to 6pm)  Interval History   No new issues. Some R upper dental pain fro 1 week. No progression  No cp or sob. Rash resolved.   No arm pain  -Data reviewed today: I reviewed all new labs and imaging results over the last 24 hours. I personally reviewed labs last 24 hours.     Physical Exam   Temp: 98  F (36.7  C) Temp src: Oral BP: 117/70 Pulse: 84 Heart Rate: 75 Resp: 16 SpO2: 95 % O2 Device: None (Room air)    Vitals:    05/27/20 0611 05/30/20 0643 06/01/20 0720   Weight: 103.9 kg (229 lb) 104.3 kg (230 lb) 105.7 kg (233 lb 1.6 oz)     Vital Signs with Ranges  Temp:  [97.9  F (36.6  C)-98.2  F (36.8  C)] 98  F (36.7  C)  Pulse:  [84] 84  Heart Rate:  [75-83] 75  Resp:  [16] 16  BP: (109-118)/(65-70) 117/70  SpO2:  [95 %-96 %] 95 %  I/O last 3 completed shifts:  In: 240 [P.O.:240]  Out: -     Constitutional: In bed, nad  Respiratory: CTAB  Cardiovascular: RRR no r/g/m  GI: soft, nt, nd  Skin/Integumen: no rash or edema  Ent: R upper dental caries. No swelling or purulence. No facial or neck swelling. NT maxillary sinus, face and neck and submandibular region  Psych: nl affect,   Neuro: nl speech and mentation    Medications       buprenorphine HCl-naloxone HCl  1 Film Sublingual TID     cloNIDine  0.1 mg Oral At Bedtime      famotidine  20 mg Oral BID AC     nicotine  1 patch Transdermal Daily     nicotine   Transdermal Q8H     vancomycin (VANCOCIN) IV  1,250 mg Intravenous Q8H       Data   Recent Labs   Lab 06/01/20  0735 05/31/20  0617 05/29/20  1402 05/26/20  0717   WBC  --   --   --  6.6   HGB  --   --   --  12.8*   MCV  --   --   --  56*   PLT  --   --   --  250   NA  --   --   --  135   POTASSIUM  --   --   --  4.0   CHLORIDE  --   --   --  103   CO2  --   --   --  28   BUN  --   --   --  12   CR 0.68 0.70 0.75 0.68   ANIONGAP  --   --   --  4   MAGALI  --   --   --  9.2   GLC  --   --   --  88       Imaging:   No results found for this or any previous visit (from the past 24 hour(s)).

## 2020-06-01 NOTE — PLAN OF CARE
A&OX4.  Up independently.  PRN tylenol and ibuprofen given X1 for tooth ache.  Suboxone dose increased to be given at 2100.  IV Vanco every 8 hours.  Possible discharge on 6/3 when abx are done.

## 2020-06-01 NOTE — PLAN OF CARE
A/Ox4. VSS on ra. Up independent in room. PRN tylenol and ibuprofen for R-side tooth pain. Plan to discharge 6/2 foreign vanco course completed. Will  continue to monitor.

## 2020-06-02 PROCEDURE — 82565 ASSAY OF CREATININE: CPT | Performed by: INTERNAL MEDICINE

## 2020-06-02 PROCEDURE — 25000132 ZZH RX MED GY IP 250 OP 250 PS 637: Performed by: NURSE PRACTITIONER

## 2020-06-02 PROCEDURE — 25000132 ZZH RX MED GY IP 250 OP 250 PS 637: Performed by: HOSPITALIST

## 2020-06-02 PROCEDURE — 12000000 ZZH R&B MED SURG/OB

## 2020-06-02 PROCEDURE — 25000132 ZZH RX MED GY IP 250 OP 250 PS 637: Performed by: PSYCHIATRY & NEUROLOGY

## 2020-06-02 PROCEDURE — 25000132 ZZH RX MED GY IP 250 OP 250 PS 637: Performed by: INTERNAL MEDICINE

## 2020-06-02 PROCEDURE — 25800030 ZZH RX IP 258 OP 636: Performed by: INTERNAL MEDICINE

## 2020-06-02 PROCEDURE — 36415 COLL VENOUS BLD VENIPUNCTURE: CPT | Performed by: INTERNAL MEDICINE

## 2020-06-02 PROCEDURE — 99232 SBSQ HOSP IP/OBS MODERATE 35: CPT | Performed by: INTERNAL MEDICINE

## 2020-06-02 PROCEDURE — 25000128 H RX IP 250 OP 636: Performed by: INTERNAL MEDICINE

## 2020-06-02 PROCEDURE — 99232 SBSQ HOSP IP/OBS MODERATE 35: CPT | Mod: 95 | Performed by: PSYCHIATRY & NEUROLOGY

## 2020-06-02 RX ADMIN — ACETAMINOPHEN 650 MG: 325 TABLET, FILM COATED ORAL at 16:21

## 2020-06-02 RX ADMIN — BUPRENORPHINE HYDROCHLORIDE, NALOXONE HYDROCHLORIDE 1 FILM: 8; 2 FILM, SOLUBLE BUCCAL; SUBLINGUAL at 08:13

## 2020-06-02 RX ADMIN — ACETAMINOPHEN 650 MG: 325 TABLET, FILM COATED ORAL at 07:14

## 2020-06-02 RX ADMIN — VANCOMYCIN HYDROCHLORIDE 1250 MG: 5 INJECTION, POWDER, LYOPHILIZED, FOR SOLUTION INTRAVENOUS at 06:08

## 2020-06-02 RX ADMIN — IBUPROFEN 600 MG: 600 TABLET ORAL at 07:15

## 2020-06-02 RX ADMIN — DIPHENHYDRAMINE HYDROCHLORIDE 25 MG: 25 CAPSULE ORAL at 00:04

## 2020-06-02 RX ADMIN — IBUPROFEN 600 MG: 600 TABLET ORAL at 00:04

## 2020-06-02 RX ADMIN — FAMOTIDINE 20 MG: 20 TABLET ORAL at 06:38

## 2020-06-02 RX ADMIN — ACETAMINOPHEN 650 MG: 325 TABLET, FILM COATED ORAL at 12:03

## 2020-06-02 RX ADMIN — IBUPROFEN 600 MG: 600 TABLET ORAL at 13:27

## 2020-06-02 RX ADMIN — ACETAMINOPHEN 650 MG: 325 TABLET, FILM COATED ORAL at 20:36

## 2020-06-02 RX ADMIN — IBUPROFEN 600 MG: 600 TABLET ORAL at 18:57

## 2020-06-02 RX ADMIN — NICOTINE 1 PATCH: 21 PATCH, EXTENDED RELEASE TRANSDERMAL at 08:13

## 2020-06-02 RX ADMIN — Medication 1 MG: at 00:04

## 2020-06-02 RX ADMIN — CLONIDINE HYDROCHLORIDE 0.1 MG: 0.1 TABLET ORAL at 22:38

## 2020-06-02 RX ADMIN — VANCOMYCIN HYDROCHLORIDE 1250 MG: 5 INJECTION, POWDER, LYOPHILIZED, FOR SOLUTION INTRAVENOUS at 13:23

## 2020-06-02 RX ADMIN — ACETAMINOPHEN 650 MG: 325 TABLET, FILM COATED ORAL at 00:04

## 2020-06-02 RX ADMIN — BUPRENORPHINE HYDROCHLORIDE, NALOXONE HYDROCHLORIDE 1 FILM: 8; 2 FILM, SOLUBLE BUCCAL; SUBLINGUAL at 20:32

## 2020-06-02 RX ADMIN — VANCOMYCIN HYDROCHLORIDE 1250 MG: 5 INJECTION, POWDER, LYOPHILIZED, FOR SOLUTION INTRAVENOUS at 20:32

## 2020-06-02 ASSESSMENT — ACTIVITIES OF DAILY LIVING (ADL)
ADLS_ACUITY_SCORE: 10

## 2020-06-02 NOTE — PROGRESS NOTES
Spoke to  to inform her of the difficulty patient has had trying to call and set up his own follow up appointment for suboxone through [facility].   stated she will follow up with this in the AM.

## 2020-06-02 NOTE — PROVIDER NOTIFICATION
Paged Dr. Raymundo to inform patient has had no luck with Dr[facility] returning his phone call's to set up follow up suboxone clinic.  Also informed him the patient stated he tried the second clinic in [place] he was given and that clinic stated they are no longer accepting new patient's.  Also asked are there other clinics we can try near [facility]??

## 2020-06-02 NOTE — CONSULTS
[facility]  Psychiatry Consultation - Follow-up note      Interim History:     Patient seen for follow-up today to assist in managing his Suboxone prescription.    Since our meeting yesterday, the patient has run into some barriers scheduling a Suboxone follow-up appointment.  Otherwise, his mood remained stable, intent for sobriety remains intact, and he plans to continue to follow-up with the plan as previously mentioned.    He is tolerating the higher dose of Suboxone very well without sedation.    His mood is stable without significant depressed mood or anxiety.  No psychotic symptoms reported.  No suicidal or homicidal thoughts reported.         Medications:       buprenorphine HCl-naloxone HCl  1 Film Sublingual BID     cloNIDine  0.1 mg Oral At Bedtime     famotidine  20 mg Oral BID AC     nicotine  1 patch Transdermal Daily     nicotine   Transdermal Q8H     vancomycin (VANCOCIN) IV  1,250 mg Intravenous Q8H          Allergies:     Allergies   Allergen Reactions     No Known Drug Allergies      Ancef [Cefazolin] Rash          Labs:     Recent Results (from the past 24 hour(s))   Creatinine    Collection Time: 06/02/20  6:25 AM   Result Value Ref Range    Creatinine 0.61 (L) 0.66 - 1.25 mg/dL    GFR Estimate >90 >60 mL/min/[1.73_m2]    GFR Estimate If Black >90 >60 mL/min/[1.73_m2]          Psychiatric Examination:     /71   Pulse 84   Temp 98  F (36.7  C) (Oral)   Resp 16   Wt 105.1 kg (231 lb 9.6 oz)   SpO2 99%   BMI 31.41 kg/m    Weight is 231 lbs 9.6 oz  Body mass index is 31.41 kg/m .  Orthostatic Vitals     None            Appearance: awake, alert  Attitude:  cooperative  Eye Contact:  fair  Mood:  better  Affect:  appropriate and in normal range  Speech:  clear, coherent  Psychomotor Behavior:  no evidence of tardive dyskinesia, dystonia, or tics  Throught Process:  logical and linear  Associations:  no loose associations  Thought Content:  no evidence of suicidal  ideation or homicidal ideation and no evidence of psychotic thought  Insight:  fair  Judgement:  intact  Oriented to:  time, person, and place  Attention Span and Concentration:  fair  Recent and Remote Memory:  fair           DIagnoses:     Opiate use disorder, on medication assisted treatment  MSSA bacteremia      Recommendations:     Continue Suboxone at the current dose of 8 mg twice a day for management of his opiate use disorder.  Tolerating the higher dose adequately without side effects.  Targeted symptoms appear to have been addressed with Suboxone.      The patient is awaiting to hear back from Dr[facility] clinic for Suboxone maintenance treatment.  I have also provided him with a phone number for the [facility] where he may also schedule for Suboxone maintenance treatment.  He will await callback from 1 of these clinics so that I know how much Suboxone to bridge him with until the appointment.  Please page me once the patient has acquired an appointment so that I may order Suboxone for him.    I have also requested consultation with the unit  to assist in coordinating the appointment discussed above in case the patient continues to have difficulty acquiring an appointment.    Please reconsult with Psychiatry sooner if needed.  Karl Raymundo MD   Text Page      Visit/Communication Style   VIRTUAL (VIDEO) communication was used to evaluate Jcarlos due to the COVID pandemic.    Jcarlos consented to the use of video communication: yes  Video START time: 3:35 PM  Video STOP time: 3:50 PM  Patient's location: [facility]   Provider's location during the visit: Home

## 2020-06-02 NOTE — PLAN OF CARE
A&O. VSS on RA. Up indep. On iv abx. C/O toothache, tylenol and ibuprofen given. Planning for discharge on Wednesday. Will continue to monitor.

## 2020-06-02 NOTE — PROGRESS NOTES
SW:  D: Updated patient that MA status is being looked into at this time. Patient stated that he has a ride for discharge tomorrow.    P: will continue to follow    DELANO Mckeon    [facility]

## 2020-06-02 NOTE — PROGRESS NOTES
[facility]    Hospitalist Progress Note    Assessment & Plan   Jcarlos Maravilla is a 24 year old male with a past medical history including IV heroin, who was initially hospitalization at [facility] 5/3-5/4 with febrile illness associated with IV heroin usage. However, after discharge, blood cultures became positive for MSSA and pt was subsequently directly admitted to [facility] 5/5/2020 for further workup including SKYE.     MSSA bacteremia, suspect related to IV drug use.  * Initially presented to [facility] evening 5/3/2020 following encouragement from police officers to seek evaluation following use of heroin with his friend, who overdosed. On initial evaluation there, was febrile, with signs of sepsis with elevated lactic acid, procalcitonin 1.22. Subsequently admitted and treated with broad antibiotics. COVID-19 PCR 5/4 negative. TTE 5/4 negative for vegetation. Sepsis resolved and pt was discharged 5/4 on Augmentin (for thrombophlebitis). However, on 5/5, blood cultures from 5/3 came back positive for MSSA and pt sent to [facility] ED for repeat BC's and subsequently directly admitted to [facility] 5/5.   * Started on cefazolin on admit 5/5. SKYE 5/6 negative for vegetations. ID consulted. BC's from 5/5, 5/6 and 05/07 NGTD.  - Cefazolin was started 5/5, changed to vancomycin due to the rash - plan is 4 weeks of IV antibiotics (stop date 6/2/2020); will remain hospitalized here to complete the course as no accepting TCU  found   - has peripheral line in place  - Cultures negative   -will discuss with ID.   -Cr today nl Vanco level pending  -day 27/28 today of abx     Superficial occlusive thrombophlebitis of right median cubital vein.  * Diagnosed by US at [facility] 5/4. Was started on Eliquis at [facility] 5/4 and discharged on Augmentin. Did not fill scripts yet after discharge.  * Thrombophlebitis improved despite not continuing Eliquis after recent discharge. Improved after admission off  anticoagulation.  Follow up imaging at time of admission showed no DVT.   - Continue off anticoagulation. Has been off entire hospital stay. R arm is normal  - Monitor site of thrombophlebitis, if worsens, could consider restarting AC.  - On antibiotics as above.     IVDU - heroin.  * Patient reported using IV heroin use daily. He started using heroin 2 years ago and prefers IV injections. He admits to using around 1-2 gm daily. He had an accidental overdose 3/2020 requiring 12 mg of narcan. Patients father was a known drug user and  of an overdose. He reportedly was working on getting enrolled in [employer] in [place] but had been waiting due to lack of insurance.   * On admit, patient expressed interest in Suboxone. Seen by Psychiatry consulted  and started on Suboxone.  - Psych reconsulted on - currently on Subaxone 1 film TID, plan continue at discharge,   -suboxone increased to 8mg bid on  and will arrange for discharge prescription today.  -per psychiatry, patient to call Dr[facility] clinic at [phone] to schedule a follow-up appointment for Suboxone maintenance treatment   - Clonidine changed from prn to 0.1 mg po at bedtime   - Continue PRN loperamide.  - Follow-up CD treatment after discharge.  -pcp follw up next week, care coordinator to arrange in  clinic [place], [place]  -admit to [place] [facility] on 20, home with family until then     Anemia, suspect chronic disease.  Pt anemic 10-11 range noted during recent hospitalization. No obvious signs of blood loss. Hgb 11.0 on admit . 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   - Monitor CBC/ Hb 11.7 on .   - Consider prbc transfusion if hgb </= 7.0 or if significant bleeding with hemodynamic instability or if symptomatic.  -His peripheral smear is suggesting a possible hemoglobinopathy, hgb is improving, would reassess after infection is treated as outpatient.      Nicotine  dependence  Patient reported smoking 1.5 PPD. Started on nicotine patch.  - Continue nicotine 21 mg patch.     Drug rash- to ancef  - noted to have a rash on his forearms bilateral and left sided chest, the rash is itchy  - suspected from IV abx ancef. Added to allergy list.  - cortisone cream TID prn, Benadryl po prn  - abx change to vancomycin with improvement   -rash resolved.      Toothache:  Intermittent. No swelling of gums but both right lower tooth are tender to press, has caries and also feeling on one.no change in exam today  -Dental consult for evaluation-  Not come to [facility]   -no fevers.   -mild pain and no progression. Has dental appt for this Thursday already scheduled.      Diet: Regular Diet Adult    Prophylaxis: PCD's, ambulation.   Cook Catheter: not present  Code Status: Full Code       Disposition Plan     Expected discharge:  6/3/2020, Admit to [facility] on 6/8/20. Home with family until then      Pan Pichardo MD  Text Page  (7am to 6pm)  Interval History   No new issues.   Not much dental pain  No R arm symptoms        -Data reviewed today: I reviewed all new labs and imaging results over the last 24 hours. I personally reviewed labs last 24 hours.     Physical Exam   Temp: 98  F (36.7  C) Temp src: Oral BP: 122/71   Heart Rate: 76 Resp: 16 SpO2: 99 % O2 Device: None (Room air)    Vitals:    05/30/20 0643 06/01/20 0720 06/02/20 0507   Weight: 104.3 kg (230 lb) 105.7 kg (233 lb 1.6 oz) 105.1 kg (231 lb 9.6 oz)     Vital Signs with Ranges  Temp:  [98  F (36.7  C)] 98  F (36.7  C)  Heart Rate:  [69-84] 76  Resp:  [16] 16  BP: (122-133)/(71-86) 122/71  SpO2:  [95 %-99 %] 99 %  I/O last 3 completed shifts:  In: 200 [P.O.:200]  Out: -     Constitutional: In bed, nad  Respiratory: CTAB  Cardiovascular: RRR no r/g/m  GI: soft, nt, nd  Skin/Integumen: no rash or edema. RUE looks fine- no swelling or redness  Ent: R upper dental caries. No swelling or purulence. No facial or neck swelling. NT  maxillary sinus, face and neck and submandibular region  Psych: nl affect,   Neuro: nl speech and mentation    Medications       buprenorphine HCl-naloxone HCl  1 Film Sublingual BID     cloNIDine  0.1 mg Oral At Bedtime     famotidine  20 mg Oral BID AC     nicotine  1 patch Transdermal Daily     nicotine   Transdermal Q8H     vancomycin (VANCOCIN) IV  1,250 mg Intravenous Q8H       Data   Recent Labs   Lab 06/02/20  0625 06/01/20  1335 06/01/20  0735 05/31/20  0617   WBC  --  6.3  --  Canceled, Test credited   HGB  --  11.7*  --  Canceled, Test credited   MCV  --  56*  --  Canceled, Test credited   PLT  --  226  --  Canceled, Test credited   CR 0.61*  --  0.68 0.70       Imaging:   No results found for this or any previous visit (from the past 24 hour(s)).

## 2020-06-02 NOTE — PLAN OF CARE
A&OX4.  Up indepednently.  C/o tooth ache, prn ibuprofen and tylenol given.  Patient will need assistance tomorrow to set up suboxone follow up appointment before discharging.  He has tried the last couple days with no return phone calls, see writers previous notes for details, social work and psych MD notified.  Plan is to discharge to his grandmas home tomorrow after last dose of Vanco.  Patient showered this evening.

## 2020-06-02 NOTE — PROGRESS NOTES
1545:  Called Dr. Pepper's clinic for the patient and was transferred to Magalis Silveira his assistant's voicemail.  Writer asked her to please call back today regarding setting up follow up appointment for his suboxone maintenance after discharge.      1730:  There was no return phone call today.  Dr. Raymundo was updated that we have not been able to set up an appointment for the patient.

## 2020-06-03 VITALS
HEART RATE: 84 BPM | OXYGEN SATURATION: 97 % | RESPIRATION RATE: 16 BRPM | TEMPERATURE: 98.6 F | WEIGHT: 231.6 LBS | DIASTOLIC BLOOD PRESSURE: 83 MMHG | BODY MASS INDEX: 31.41 KG/M2 | SYSTOLIC BLOOD PRESSURE: 136 MMHG

## 2020-06-03 PROBLEM — B95.61 MSSA BACTEREMIA: Status: ACTIVE | Noted: 2020-06-03

## 2020-06-03 PROBLEM — R78.81 MSSA BACTEREMIA: Status: ACTIVE | Noted: 2020-06-03

## 2020-06-03 LAB
CREAT SERPL-MCNC: 0.71 MG/DL (ref 0.66–1.25)
GFR SERPL CREATININE-BSD FRML MDRD: >90 ML/MIN/{1.73_M2}

## 2020-06-03 PROCEDURE — 99239 HOSP IP/OBS DSCHRG MGMT >30: CPT | Performed by: INTERNAL MEDICINE

## 2020-06-03 PROCEDURE — 25000128 H RX IP 250 OP 636: Performed by: INTERNAL MEDICINE

## 2020-06-03 PROCEDURE — 25000132 ZZH RX MED GY IP 250 OP 250 PS 637: Performed by: NURSE PRACTITIONER

## 2020-06-03 PROCEDURE — 82565 ASSAY OF CREATININE: CPT | Performed by: INTERNAL MEDICINE

## 2020-06-03 PROCEDURE — 25000132 ZZH RX MED GY IP 250 OP 250 PS 637: Performed by: PSYCHIATRY & NEUROLOGY

## 2020-06-03 PROCEDURE — 36415 COLL VENOUS BLD VENIPUNCTURE: CPT | Performed by: INTERNAL MEDICINE

## 2020-06-03 PROCEDURE — 25000132 ZZH RX MED GY IP 250 OP 250 PS 637: Performed by: INTERNAL MEDICINE

## 2020-06-03 PROCEDURE — 25000132 ZZH RX MED GY IP 250 OP 250 PS 637: Performed by: HOSPITALIST

## 2020-06-03 PROCEDURE — 25800030 ZZH RX IP 258 OP 636: Performed by: INTERNAL MEDICINE

## 2020-06-03 RX ORDER — CLONIDINE HYDROCHLORIDE 0.1 MG/1
0.1 TABLET ORAL AT BEDTIME
Qty: 30 TABLET | Refills: 0 | Status: SHIPPED | OUTPATIENT
Start: 2020-06-03

## 2020-06-03 RX ORDER — BUPRENORPHINE AND NALOXONE 8; 2 MG/1; MG/1
1 FILM, SOLUBLE BUCCAL; SUBLINGUAL 2 TIMES DAILY
Qty: 20 FILM | Refills: 0 | Status: SHIPPED | OUTPATIENT
Start: 2020-06-03 | End: 2020-06-13

## 2020-06-03 RX ADMIN — VANCOMYCIN HYDROCHLORIDE 1250 MG: 5 INJECTION, POWDER, LYOPHILIZED, FOR SOLUTION INTRAVENOUS at 05:54

## 2020-06-03 RX ADMIN — NICOTINE 1 PATCH: 21 PATCH, EXTENDED RELEASE TRANSDERMAL at 08:07

## 2020-06-03 RX ADMIN — ACETAMINOPHEN 650 MG: 325 TABLET, FILM COATED ORAL at 13:57

## 2020-06-03 RX ADMIN — DIPHENHYDRAMINE HYDROCHLORIDE 25 MG: 25 CAPSULE ORAL at 00:38

## 2020-06-03 RX ADMIN — Medication 1 MG: at 00:38

## 2020-06-03 RX ADMIN — IBUPROFEN 600 MG: 600 TABLET ORAL at 13:58

## 2020-06-03 RX ADMIN — VANCOMYCIN HYDROCHLORIDE 1250 MG: 5 INJECTION, POWDER, LYOPHILIZED, FOR SOLUTION INTRAVENOUS at 12:52

## 2020-06-03 RX ADMIN — ACETAMINOPHEN 650 MG: 325 TABLET, FILM COATED ORAL at 00:38

## 2020-06-03 RX ADMIN — ACETAMINOPHEN 650 MG: 325 TABLET, FILM COATED ORAL at 05:58

## 2020-06-03 RX ADMIN — IBUPROFEN 600 MG: 600 TABLET ORAL at 05:59

## 2020-06-03 RX ADMIN — BUPRENORPHINE HYDROCHLORIDE, NALOXONE HYDROCHLORIDE 1 FILM: 8; 2 FILM, SOLUBLE BUCCAL; SUBLINGUAL at 08:08

## 2020-06-03 ASSESSMENT — ACTIVITIES OF DAILY LIVING (ADL)
ADLS_ACUITY_SCORE: 10

## 2020-06-03 NOTE — CONSULTS
Patient will need insurance to see me (Dr Pepper) at my private practice [phone].  Otherwise it will be cash appointments.  [facility]

## 2020-06-03 NOTE — PROGRESS NOTES
Care Coordination:    Fax sent to [facility] [place] office, Baldemar Jean-Baptiste, to show evidence that the pt will not be discharged in time to go to the [place] office mandated urine test prior to 4 PM today, fax # [phone].    PCP at [facility] made.  Dr. Watson who the pt has seen in the past is currently not seeing pt's and another provider was picked and appointment added to AVS:  Follow-up Appointments     Follow-up Appointments   Follow-up and recommended labs and tests      1. call Dr. Pepper's clinic at [phone] to schedule a follow-up appointment for Suboxone maintenance treatment       2. new primary care doctor in [place], [place] for next week- we will set this up for you   You have a virtual visit on Friday June 5th with Dr. Montague at 1:20PM, you need to download the [employer] Micaela and instructions have been sent to your mychart.     With any questions regarding this appointment call  [phone]   [address] , [place] [address]   3.   [facility]   Located in the [facility]   [address] [place] [address] [phone]       4.  .keep dental appt on Thursday     5.  . Admit to [place] [facility] on 6/8/20           The pt stated he has family to give him a ride home and a ride to his appointments and DESTINEE Villarreal.        Eladia Tobar RN, BSN Care Coordinator  [facility]  Mobile: [phone]

## 2020-06-03 NOTE — PLAN OF CARE
A/Ox4. VSS on ra. C/o tooth pain, managed with prn tylenol and ibuprofen. Will need assistance to set up follow up appointment today. Has tried to call the past couple of days with no return call, SAIRA and psych MD aware. Plans to discharge to [facility] home when last dose of vanco complete today. Will continue to monitor.

## 2020-06-03 NOTE — PLAN OF CARE
VSS on RA, denies chest pain or SOB. C/o of tooth ache- managed with tylenol and ibuprofen. Last dose vanco infusing and discharge after with yanci. Will continue to monitor till then.

## 2020-06-03 NOTE — PROGRESS NOTES
Dr. Raymundo called back and said he text Dr. Pepper who told him he would be sure to have his clinic call tomorrow to set up suboxone follow up appointment.   consult was also placed to help facilitate making sure this appointment gets set up before discharge.  Another clinic that the patient can try is [facility] [phone].

## 2020-06-03 NOTE — CONSULTS
Psychiatry consult:  The patient will be referred to [facility]'s addiction medicine clinic for suboxone maintenance treatment. I called this morning and initiated the appointment. Their counselor will contact the patient within 2 days via phone schedule the appointment. They will try to call him today on the unit however if they are not able to and he is discharged, they will call him on his cell phone. If he does not receive a call in 2 days, he should contact the clinic directly.     Please include this info on his discharge summary:  [facility]  Located in the [place] [phone]    I will supply the patient with a 10-day supply of suboxone. Sent to our pharmacy.     I spoke with the patient over the phone this morning to provide him with this update.     Karl Raymundo MD   Text Page

## 2020-06-03 NOTE — PLAN OF CARE
Went over discharge papers and medication with pt. All questions answered. Waiting for grandma to pick him up at door.

## 2020-06-03 NOTE — DISCHARGE SUMMARY
[facility]    Discharge Summary  Hospitalist    Date of Admission:  2020  Date of Discharge:  6/3/2020  Discharging Provider: Pan Pichardo MD    Discharge Diagnoses     MSSA bacteremia, suspect related to IV drug use- completed 4 weeks of abx in hospital      History of Present Illness   Jcarlos Maravilla is a 24 year old male who presented to the ER with help[ for heroine addiction.  Patient states that he was using with a friend at around 4-5 PM last night.  His friend's father broke into his friend's house, and was yelling at him and demanding him to leave.  Police stopped him this evening on the street and told him that his friend that overdose and that he should come to the emergency department to be seen.  Patient states that he has been using heroin on a daily basis.  He started using heroin 2 years ago, and prefers to use intravenous injections.  He uses this with other friends as well.  He had an accidental overdose at the end of March. He uses around 0.5 gm every day for last 2 years. He states that he is trying to get on Suboxone or trying to get into a treatment program but it is extremely difficult.  He told me that he enrolled in teen- adult [employer] in [place].   He has depression but has not had suicidal ideation or plan recently.  He does have some passive thoughts at times.  His father, Shane, was a known drug user and  of an overdose.  His mother  of a pontine stroke.  Patient has a sister, Eli, and another brother.  Patient denies any other drug use except for marijuana.  Patient was noted to have a fever upon arrival.  In ER he had elevated temp and tachycardia. He was given IV fluids. Blood work showed normal except elevated CRP and lactate.  So patient is getting admitted for fever, sepsis - unknown source other than IV drug use but no cellulitis noted near the sites.          Hospital Course   Jcarlos Maravilla was admitted on 2020.  The  following problems were addressed during his hospitalization:    Principal Problem:    MSSA bacteremia  Active Problems:    Fever    Heroin addiction (H)    IV drug abuse (H)    Thrombophlebitis arm    Bacteremia  Jcarlos Maravilla is a 24 year old male with a past medical history including IV heroin, who was initially hospitalization at [facility] 5/3-5/4 with febrile illness associated with IV heroin usage. However, after discharge, blood cultures became positive for MSSA and pt was subsequently directly admitted to [facility] 5/5/2020 for further workup including SKYE.     MSSA bacteremia, suspect related to IV drug use.  * Initially presented to [facility] evening 5/3/2020 following encouragement from police officers to seek evaluation following use of heroin with his friend, who overdosed. On initial evaluation there, was febrile, with signs of sepsis with elevated lactic acid, procalcitonin 1.22. Subsequently admitted and treated with broad antibiotics. COVID-19 PCR 5/4 negative. TTE 5/4 negative for vegetation. Sepsis resolved and pt was discharged 5/4 on Augmentin (for thrombophlebitis). However, on 5/5, blood cultures from 5/3 came back positive for MSSA and pt sent to [facility] ED for repeat BC's and subsequently directly admitted to [facility] 5/5.   * Started on cefazolin on admit 5/5. SKYE 5/6 negative for vegetations. ID consulted. BC's from 5/5, 5/6 and 05/07 NGTD.  - Cefazolin was started 5/5, changed to vancomycin due to the rash - plan is 4 weeks of IV antibiotics (stop date 6/2/2020); will remain hospitalized here to complete the course as no accepting TCU  found   - has peripheral line in place  - Cultures negative   -Cr today nl Vanco level in goal range  -day 28/28 today of abx today. Pt to discharge after 1300 dose today  - pcp follow up. No ID follow up needed. Pt completed abx course in hospital  - afebrile, feeling well. Nl exam. Nl vitals in days prior to discharge.   -discussed care plan with pt  and ID on day of discharge.     Superficial occlusive thrombophlebitis of right median cubital vein.  * Diagnosed by US at [facility] . Was started on Eliquis at [facility]  and discharged on Augmentin. Did not fill scripts yet after discharge.  * Thrombophlebitis improved despite not continuing Eliquis after recent discharge. Improved after admission off anticoagulation.  Follow up imaging at time of admission showed no DVT.   - Continue off anticoagulation. Has been off entire hospital stay. R arm is normal  - Monitor site of thrombophlebitis, if worsens, could consider restarting AC.  - On antibiotics as above.  -resolved. No RU E symptoms.      IVDU - heroin.  * Patient reported using IV heroin use daily. He started using heroin 2 years ago and prefers IV injections. He admits to using around 1-2 gm daily. He had an accidental overdose 3/2020 requiring 12 mg of narcan. Patients father was a known drug user and  of an overdose. He reportedly was working on getting enrolled in [facility] [employer] in [place] but had been waiting due to lack of insurance.   * On admit, patient expressed interest in Suboxone. Seen by Psychiatry consulted  and started on Suboxone.  - Psych reconsulted on - currently on Subaxone 1 film TID, plan continue at discharge,   -suboxone increased to 8mg bid on  and will arrange for discharge prescription today.  -per psychiatry, patient to call Dr. Pepper's clinic at [phone] to schedule a follow-up appointment for Suboxone maintenance treatment   - Clonidine changed from prn to 0.1 mg po at bedtime   Doing well.   Followed by psychiatry closely in hospital    Plan at discharge:   -pcp follw up next week, care coordinator to arrange in [place], [place]  -admit to [place] [facility] on 20, home with family until then   -follow up with [facility] by psychiatry.10 day supply suboxone provided. [facility] clinic will  contact pt in coming 1-2 days to set up appt  Clonidine 0.1mg at bedtime -30 day supply provided    Anemia, suspect chronic disease.  Pt anemic 10-11 range noted during recent hospitalization. No obvious signs of blood loss. Hgb 11.0 on admit 5/5. Iron studies 5/6 suggestive of anemia of chornic disease, peripheral smear 5/5 pending.   - Monitor CBC/ Hb 11.7 on 6/1.   - Consider prbc transfusion if hgb </= 7.0 or if significant bleeding with hemodynamic instability or if symptomatic.  -His peripheral smear is suggesting a possible hemoglobinopathy, hgb is improving, would reassess after infection is treated as outpatient.      Nicotine dependence  Patient reported smoking 1.5 PPD. Started on nicotine patch.  - Continue nicotine 21 mg patch.     Drug rash- to ancef  - noted to have a rash on his forearms bilateral and left sided chest, the rash is itchy  - suspected from IV abx ancef. Added to allergy list.  - cortisone cream TID prn, Benadryl po prn  - abx change to vancomycin with improvement   -rash resolved.   -no further itching     Toothache:  Intermittent. No swelling of gums but both right lower tooth are tender to press, has caries and also feeling on one.no change in exam today  -Dental consult for evaluation-  Not come to [facility]   -no fevers.   -mild pain and no progression. Has dental appt for this Thursday already scheduled.   -pain resolved on day of discharge.      Diet: Regular Diet Adult    Prophylaxis: PCD's, ambulation.   Cook Catheter: not present  Code Status: Full Code       Disposition Plan     Expected discharge:  6/3/2020, Admit to [facility] on 6/8/20. Home with family until then    Pan Pichardo MD, MD    Significant Results and Procedures   See hospital course    Pending Results   none  Unresulted Labs Ordered in the Past 30 Days of this Admission     No orders found from 4/5/2020 to 5/6/2020.          Code Status   Full Code       Primary Care Physician   Physician No  Ref-Primary    Physical Exam   Temp: 98.1  F (36.7  C) Temp src: Oral BP: 134/78   Heart Rate: 76 Resp: 16 SpO2: 97 % O2 Device: None (Room air)    Vitals:    05/30/20 0643 06/01/20 0720 06/02/20 0507   Weight: 104.3 kg (230 lb) 105.7 kg (233 lb 1.6 oz) 105.1 kg (231 lb 9.6 oz)     Vital Signs with Ranges  Temp:  [98  F (36.7  C)-98.4  F (36.9  C)] 98.1  F (36.7  C)  Heart Rate:  [75-93] 76  Resp:  [16] 16  BP: (118-139)/(74-83) 134/78  SpO2:  [97 %] 97 %  I/O last 3 completed shifts:  In: 540 [P.O.:540]  Out: -   Constitutional: In bed, nad  Respiratory:     CTAB  Cardiovascular: RRR no r/g/m  GI: soft, nt, nd  Skin/Integumen: no rash or edema. RUE looks fine- no swelling or redness  Ent:No facial or neck swelling. NT maxillary sinus, face and neck and submandibular region  Psych: nl affect,   Neuro: nl speech and mentation      Discharge Disposition   Discharged to home  Condition at discharge: Good    Consultations This Hospital Stay   CARDIOLOGY IP CONSULT  INFECTIOUS DISEASES IP CONSULT  PSYCHIATRY IP CONSULT  SOCIAL WORK IP CONSULT  CHEMICAL DEPENDENCY IP CONSULT  SOCIAL WORK IP CONSULT  PSYCHIATRY IP CONSULT  VASCULAR ACCESS ADULT IP CONSULT  VASCULAR ACCESS ADULT IP CONSULT  PHARMACY TO DOSE VANCO  VASCULAR ACCESS ADULT IP CONSULT  VASCULAR ACCESS ADULT IP CONSULT  DENTIST IP CONSULT  PSYCHIATRY IP CONSULT  PSYCHIATRY IP CONSULT  SOCIAL WORK IP CONSULT  PSYCHIATRY IP CONSULT    Time Spent on this Encounter   IPan MD, personally saw the patient today and spent greater than 30 minutes discharging this patient.    Discharge Orders      Reason for your hospital stay    MSSA /staph aureus bacteria in blood from IV drug use as source of infection     Activity    Your activity upon discharge: activity as tolerated     Follow-up and recommended labs and tests     1. call Dr. Pepper's clinic at [phone] to schedule a follow-up appointment for Suboxone maintenance treatment     2. new primary care  doctor in [place], [place] for next week- we will set this up for you  You have a virtual visit on Friday June 5th with Dr. Montague at 1:20PM, you need to download the [employer] Micaela and instructions have been sent to your mychart.    With any questions regarding this appointment call  [phone]  [address] , [place] [address]  3.   [facility]  Located in the [address] [address] [phone]      3.keep dental appt on Thursday    4. Admit to [place] [facility] on 6/8/20     Full Code     Diet    Follow this diet upon discharge: Orders Placed This Encounter      Regular Diet Adult     Discharge Medications   Current Discharge Medication List      START taking these medications    Details   buprenorphine HCl-naloxone HCl (SUBOXONE) 8-2 MG per film Place 1 Film under the tongue 2 times daily for 10 days  Qty: 20 Film, Refills: 0    Comments: THERESA: [id number]  Associated Diagnoses: Opioid dependence with intoxication with complication (H); Heroin addiction (H)      cloNIDine (CATAPRES) 0.1 MG tablet Take 1 tablet (0.1 mg) by mouth At Bedtime  Qty: 30 tablet, Refills: 0    Comments: Future refills by PCP  Physician No Ref-Primary with phone number None.  Associated Diagnoses: Opioid dependence with intoxication with complication (H)         CONTINUE these medications which have NOT CHANGED    Details   ibuprofen (ADVIL/MOTRIN) 600 MG tablet Take 600 mg by mouth every 6 hours as needed for moderate pain      loperamide (IMODIUM) 2 MG capsule Take 1 capsule (2 mg) by mouth 4 times daily as needed for diarrhea  Qty: 20 capsule, Refills: 0    Associated Diagnoses: Heroin addiction (H)         STOP taking these medications       amoxicillin-clavulanate (AUGMENTIN) 875-125 MG tablet Comments:   Reason for Stopping:         apixaban ANTICOAGULANT (ELIQUIS STARTER PACK) 5 MG tablet  Comments:   Reason for Stopping:             Allergies   Allergies   Allergen Reactions     No Known Drug Allergies      Ancef [Cefazolin] Rash     Data   Most Recent 3 CBC's:  Recent Labs   Lab Test 06/01/20  1335 05/31/20  0617 05/26/20  0717   WBC 6.3 Canceled, Test credited 6.6   HGB 11.7* Canceled, Test credited 12.8*   MCV 56* Canceled, Test credited 56*    Canceled, Test credited 250      Most Recent 3 BMP's:  Recent Labs   Lab Test 06/03/20  0625 06/02/20  0625 06/01/20  0735  05/26/20  0717  05/17/20  0616  05/14/20  0603   NA  --   --   --   --  135  --  136  --  138   POTASSIUM  --   --   --   --  4.0  --  4.4  --  4.3   CHLORIDE  --   --   --   --  103  --  104  --  104   CO2  --   --   --   --  28  --  27  --  29   BUN  --   --   --   --  12  --  8  --  10   CR 0.71 0.61* 0.68   < > 0.68   < > 0.77   < > 0.62*   ANIONGAP  --   --   --   --  4  --  5  --  5   MAGALI  --   --   --   --  9.2  --  9.4  --  9.2   GLC  --   --   --   --  88  --  86  --  93    < > = values in this interval not displayed.     Most Recent 2 LFT's:  Recent Labs   Lab Test 05/06/20  0629   AST 13   ALT 22   ALKPHOS 66   BILITOTAL 0.4     Most Recent INR's and Anticoagulation Dosing History:  Anticoagulation Dose History     There is no flowsheet data to display.        Most Recent 3 Troponin's:No lab results found.  Most Recent Cholesterol Panel:No lab results found.  Most Recent 6 Bacteria Isolates From Any Culture (See EPIC Reports for Culture Details):  Recent Labs   Lab Test 05/06/20  2335 05/06/20  1129 05/05/20  1106 05/05/20  1043 05/04/20  0138 05/04/20  0046   CULT No growth No growth No growth No growth No growth Cultured on the 1st day of incubation:  Staphylococcus aureus  *  Critical Value/Significant Value, preliminary result only, called to and read back by  Dr Fink, On call hospitalist, 5/5/20 @ 0239 TF    (Note)  POSITIVE for STAPHYLOCOCCUS AUREUS and NEGATIVE for the mecA gene  (not MRSA) by [employer]  multiplex nucleic acid test. The mecA gene was  not detected. Final identification and antimicrobial susceptibility  testing will be verified by standard methods.    Specimen tested with OP3Nvoiceigene multiplex, gram-positive blood culture  nucleic acid test for the following targets: Staph aureus, Staph  epidermidis, Staph lugdunensis, other Staph species, Enterococcus  faecalis, Enterococcus faecium, Streptococcus species, S. agalactiae,  S. anginosus grp., S. pneumoniae, S. pyogenes, Listeria sp., mecA  (methicillin resistance) and Rico/B (vancomycin resistance).    Critical Value/Significant Value called to and read back [facility]       Most Recent TSH, T4 and A1c Labs:No lab results found.  Results for orders placed or performed during the hospital encounter of 20   Transesophageal Echocardiogram    [facility]    [id number]  [id number]  [id number]^JAM^JOSSIE^[facility]  Echocardiography Laboratory  [address] [place] [address]        Name: RUDY VIVAS  MRN: [id number]  : 1995  Study Date: 2020 02:35 PM  Age: 24 yrs  Gender: Male  Patient Location: [facility]  Reason For Study: Endocarditis  Ordering Physician: JOSSIE POLK  Performed By: Dimitris Parsons     BSA: 2.2 m2  Height: 72 in  Weight: 212 lb  HR: 111  BP: 114/77 mmHg  _____________________________________________________________________________  __        Procedure  Complete SKYE Adult. SKYE Probe serial #[id number] was used during the procedure.  The heart rate, respiratory rate and response to care were monitored  throughout the procedure with the assistance of the nurse.  _____________________________________________________________________________  __        Interpretation Summary     No vegetations.  _____________________________________________________________________________  __        SKYE  I determined this patient to be  an appropriate candidate for the planned  sedation and procedure and have reassessed the patient immediately prior to  sedation and procedure. Total sedation time: 48 minutes of continuous bedside  1:1 monitoring. Versed (10mg) was given intravenously. Fentanyl (200mcg) was  given intravenously. Consent to the procedure was obtained prior to sedation.  There were no complications associated with this procedure. The  transesophageal probe was passed without difficulty.     Left Ventricle  The left ventricle is normal in size. There is normal left ventricular wall  thickness. Left ventricular systolic function is normal. No regional wall  motion abnormalities noted.     Right Ventricle  Normal right ventricle structure and size.     Atria  Normal left atrial size. Right atrial size is normal. There is no atrial shunt  seen. No thrombus is detected in the left atrial appendage.        Mitral Valve  The mitral valve leaflets appear normal. There is no evidence of stenosis,  fluttering, or prolapse.     Tricuspid Valve  Normal tricuspid valve.     Aortic Valve  Normal tricuspid aortic valve.     Pulmonic Valve  The pulmonic valve is normal in structure and function.     Vessels  The aortic root is normal size.        Pericardial/Pleural  The pericardium appears normal.     Rhythm  Sinus rhythm was noted.  _____________________________________________________________________________  __                                Report approved by: Carlos Zepeda 05/06/2020 03:54 PM                    _____________________________________________________________________________  __

## 2020-06-05 ENCOUNTER — VIRTUAL VISIT (OUTPATIENT)
Dept: FAMILY MEDICINE | Facility: CLINIC | Age: 25
End: 2020-06-05
Payer: MEDICAID

## 2020-06-05 DIAGNOSIS — A41.9 BACTERIAL SEPSIS (H): Primary | ICD-10-CM

## 2020-06-05 DIAGNOSIS — B95.61 MSSA BACTEREMIA: ICD-10-CM

## 2020-06-05 DIAGNOSIS — F19.10 IV DRUG ABUSE (H): ICD-10-CM

## 2020-06-05 DIAGNOSIS — R78.81 MSSA BACTEREMIA: ICD-10-CM

## 2020-06-05 DIAGNOSIS — F11.20 HEROIN ADDICTION (H): ICD-10-CM

## 2020-06-05 PROCEDURE — 99442 ZZC PHYSICIAN TELEPHONE EVALUATION 11-20 MIN: CPT | Performed by: FAMILY MEDICINE

## 2020-06-05 NOTE — NURSING NOTE
Health Maintenance Due   Topic Date Due     HPV IMMUNIZATION (1 - Male 2-dose series) 08/25/2008     HIV SCREENING  08/25/2012     PREVENTIVE CARE VISIT  03/31/2015     PNEUMOCOCCAL IMMUNIZATION 19-64 MEDIUM RISK (1 of 1 - PPSV23) 08/25/2016     PHQ-2  01/01/2020     Lupe WORKMAN LPN

## 2020-06-05 NOTE — PROGRESS NOTES
"Jcarlos Maravilla is a 24 year old male who is being evaluated via a billable telephone visit.      The patient has been notified of following:     \"This telephone visit will be conducted via a call between you and your physician/provider. We have found that certain health care needs can be provided without the need for a physical exam.  This service lets us provide the care you need with a short phone conversation.  If a prescription is necessary we can send it directly to your pharmacy.  If lab work is needed we can place an order for that and you can then stop by our lab to have the test done at a later time.    Telephone visits are billed at different rates depending on your insurance coverage.  During this emergency period for some insurers they may be billed the same as an in-person visit.  Please reach out to your insurance provider with any questions.    Consent given: YES  If during the course of the call the physician/provider feels a telephone visit is not appropriate, you will not be charged for this service.\"       Jcarlos Maravilla complains of    Chief Complaint   Patient presents with     Hospital F/U       I have PERTINENT PARTS OF patient's Past Medical History, Social History, Family History and Medication List, and updated if appropriate    ALLERGIES  No known drug allergies and Ancef [cefazolin]      Additional provider notes: Hospital follow up-[facility]  Arm looks good. No fevers. Due to start treatment in 4-5 days. But they have an active COVID case. No drug use. Living with brother in law, supportive and drug free household    Assessment/Plan:    ICD-10-CM    1. Bacterial sepsis (H)  A41.9    2. Heroin addiction (H)  F11.20    3. IV drug abuse (H)  F19.10    4. MSSA bacteremia  R78.81         Treatment for opiates  Infection without sign of recurrence  Call if sick again, discussed signs  He's committed to sobriety, currently on suboxone    I have reviewed the note as " documented above.  This accurately captures the substance of my conversation with the patient.      Phone call contact time 12 min

## 2021-04-19 ENCOUNTER — HOSPITAL ENCOUNTER (EMERGENCY)
Facility: CLINIC | Age: 26
Discharge: HOME | End: 2021-04-19
Attending: FAMILY MEDICINE | Admitting: FAMILY MEDICINE
Payer: COMMERCIAL

## 2021-04-19 ENCOUNTER — TELEPHONE (OUTPATIENT)
Dept: EMERGENCY MEDICINE | Facility: CLINIC | Age: 26
End: 2021-04-19

## 2021-04-19 VITALS
OXYGEN SATURATION: 98 % | HEART RATE: 88 BPM | RESPIRATION RATE: 18 BRPM | TEMPERATURE: 98.6 F | SYSTOLIC BLOOD PRESSURE: 128 MMHG | DIASTOLIC BLOOD PRESSURE: 90 MMHG

## 2021-04-19 DIAGNOSIS — Z20.822 SUSPECTED COVID-19 VIRUS INFECTION: ICD-10-CM

## 2021-04-19 LAB
FLUAV RNA RESP QL NAA+PROBE: NEGATIVE
FLUBV RNA RESP QL NAA+PROBE: NEGATIVE
LABORATORY COMMENT REPORT: NORMAL
RSV RNA SPEC QL NAA+PROBE: NORMAL
SARS-COV-2 RNA RESP QL NAA+PROBE: NEGATIVE
SPECIMEN SOURCE: NORMAL

## 2021-04-19 PROCEDURE — 87636 SARSCOV2 & INF A&B AMP PRB: CPT | Performed by: FAMILY MEDICINE

## 2021-04-19 PROCEDURE — C9803 HOPD COVID-19 SPEC COLLECT: HCPCS | Performed by: FAMILY MEDICINE

## 2021-04-19 PROCEDURE — 99282 EMERGENCY DEPT VISIT SF MDM: CPT | Performed by: FAMILY MEDICINE

## 2021-04-19 PROCEDURE — 99283 EMERGENCY DEPT VISIT LOW MDM: CPT | Performed by: FAMILY MEDICINE

## 2021-04-19 ASSESSMENT — ENCOUNTER SYMPTOMS
NAUSEA: 1
DIARRHEA: 1

## 2021-04-19 NOTE — ED PROVIDER NOTES
History     Chief Complaint   Patient presents with     Fatigue     HPI  Jcarlos Maravilla is a 25 year old male who presented to the emergency room today secondary concerns of possible COVID-19 infection.  Patient states that a few days ago he had a upset stomach that lasted for about a day.  His sister and brother-in-law both tested positive for Covid yesterday.  Patient states that he is otherwise healthy.  Patient had multiple questions about COVID-19.    Allergies:  Allergies   Allergen Reactions     No Known Drug Allergies      Ancef [Cefazolin] Rash       Problem List:    Patient Active Problem List    Diagnosis Date Noted     MSSA bacteremia 06/03/2020     Priority: Medium     2/2 ivdu. Completed 4 weeks abx at [facility] on 6/3/20.        Bacteremia 05/05/2020     Priority: Medium     Fever 05/04/2020     Priority: Medium     Bacterial sepsis (H) 05/04/2020     Priority: Medium     Heroin addiction (H) 05/04/2020     Priority: Medium     IV drug abuse (H) 05/04/2020     Priority: Medium     Fever, unspecified fever cause 05/04/2020     Priority: Medium     Fever of unknown origin 05/04/2020     Priority: Medium     Thrombophlebitis arm 05/04/2020     Priority: Medium        Past Medical History:    No past medical history on file.    Past Surgical History:    No past surgical history on file.    Family History:    Family History   Problem Relation Age of Onset     Cerebrovascular Disease Mother      Diabetes Maternal Grandfather      Asthma Maternal Grandfather        Social History:  Marital Status:  Single [1]  Social History     Tobacco Use     Smoking status: Current Some Day Smoker     Packs/day: 1.50     Smokeless tobacco: Never Used     Tobacco comment: second hand exposure   Substance Use Topics     Alcohol use: Yes     Comment: occasional      Drug use: Yes     Frequency: 7.0 times per week     Types: Marijuana, Opiates     Comment: reports injecting 1-3.5grams of heroin daily        Medications:     cloNIDine (CATAPRES) 0.1 MG tablet  ibuprofen (ADVIL/MOTRIN) 600 MG tablet  loperamide (IMODIUM) 2 MG capsule          Review of Systems   Gastrointestinal: Positive for diarrhea and nausea.        Symptoms now improved.   All other systems reviewed and are negative.      Physical Exam   BP: (!) 128/90  Pulse: 88  Temp: 98.6  F (37  C)  Resp: 18  SpO2: 98 %      Physical Exam  Vitals signs and nursing note reviewed.   Constitutional:       General: He is not in acute distress.     Appearance: He is not ill-appearing or toxic-appearing.   HENT:      Head: Normocephalic and atraumatic.   Eyes:      Extraocular Movements: Extraocular movements intact.      Conjunctiva/sclera: Conjunctivae normal.      Pupils: Pupils are equal, round, and reactive to light.   Neck:      Musculoskeletal: Normal range of motion and neck supple.   Cardiovascular:      Rate and Rhythm: Normal rate.   Pulmonary:      Effort: Pulmonary effort is normal. No respiratory distress.   Neurological:      Mental Status: He is alert and oriented to person, place, and time.   Psychiatric:         Mood and Affect: Mood normal.         Behavior: Behavior normal.         ED Course        Procedures               Critical Care time:  none            Results for orders placed or performed during the hospital encounter of 04/19/21 (from the past 24 hour(s))   Symptomatic Influenza A/B & SARS-CoV2 (COVID-19) Virus PCR Multiplex    Specimen: Nasopharyngeal   Result Value Ref Range    Flu A/B & SARS-COV-2 PCR Source Nasopharyngeal     SARS-CoV-2 PCR Result NEGATIVE     Influenza A PCR Negative NEG^Negative    Influenza B PCR Negative NEG^Negative    Respiratory Syncytial Virus PCR (Note)     Flu A/B & SARS-CoV-2 PCR Comment (Note)          Assessments & Plan (with Medical Decision Making)  25-year-old male to the ER desiring COVID-19 testing secondary to recent exposure to his sister and brother-in-law who recently tested positive for COVID-19.  Patient's  test came back negative.  Patient was given information about COVID-19 and the need to quarantine for 10 days given the exposure.     I have reviewed the nursing notes.    I have reviewed the findings, diagnosis, plan and need for follow up with the patient.       Final diagnoses:   Suspected COVID-19 virus infection       4/19/2021   [facility] EMERGENCY DEPT     Kendall José,   04/19/21 0229

## 2021-04-19 NOTE — TELEPHONE ENCOUNTER
Patient called to request covid test results from this AM.  There was no current covid test seen in Lab section of this chart.  Patient was given the following information:  Assessments & Plan (with Medical Decision Making)  25-year-old male to the ER desiring COVID-19 testing secondary to recent exposure to his sister and brother-in-law who recently tested positive for COVID-19.  Patient's test came back negative.  Patient was given information about COVID-19 and the need to quarantine for 10 days given the exposure.      Patient reported I was told someone would call me with the results. Patient denied getting the results prior.

## 2021-07-05 ENCOUNTER — HOSPITAL ENCOUNTER (EMERGENCY)
Facility: CLINIC | Age: 26
Discharge: HOME | End: 2021-07-05
Attending: EMERGENCY MEDICINE | Admitting: EMERGENCY MEDICINE
Payer: COMMERCIAL

## 2021-07-05 VITALS
DIASTOLIC BLOOD PRESSURE: 83 MMHG | WEIGHT: 236 LBS | OXYGEN SATURATION: 95 % | RESPIRATION RATE: 14 BRPM | TEMPERATURE: 99.1 F | HEART RATE: 100 BPM | SYSTOLIC BLOOD PRESSURE: 124 MMHG | BODY MASS INDEX: 32.01 KG/M2

## 2021-07-05 DIAGNOSIS — T78.40XA ALLERGIC REACTION, INITIAL ENCOUNTER: ICD-10-CM

## 2021-07-05 PROCEDURE — 99284 EMERGENCY DEPT VISIT MOD MDM: CPT | Performed by: EMERGENCY MEDICINE

## 2021-07-05 PROCEDURE — 96372 THER/PROPH/DIAG INJ SC/IM: CPT | Performed by: EMERGENCY MEDICINE

## 2021-07-05 PROCEDURE — 250N000011 HC RX IP 250 OP 636: Performed by: EMERGENCY MEDICINE

## 2021-07-05 RX ORDER — METHYLPREDNISOLONE SODIUM SUCCINATE 125 MG/2ML
125 INJECTION, POWDER, LYOPHILIZED, FOR SOLUTION INTRAMUSCULAR; INTRAVENOUS ONCE
Status: COMPLETED | OUTPATIENT
Start: 2021-07-05 | End: 2021-07-05

## 2021-07-05 RX ORDER — DIPHENHYDRAMINE HCL 25 MG
25-50 CAPSULE ORAL EVERY 4 HOURS PRN
Qty: 16 CAPSULE | Refills: 1 | Status: SHIPPED | OUTPATIENT
Start: 2021-07-05

## 2021-07-05 RX ADMIN — METHYLPREDNISOLONE SODIUM SUCCINATE 125 MG: 125 INJECTION, POWDER, FOR SOLUTION INTRAMUSCULAR; INTRAVENOUS at 10:48

## 2021-07-05 NOTE — ED PROVIDER NOTES
History     Chief Complaint   Patient presents with     Rash     HPI  Jcarlos Maravilla is a 25 year old male who presents with a rash.  This began 2 days ago after using a body wash that was antidandruff.  He is currently [facility] not able to take any Benadryl without a note.  He has had no difficulty swallowing or breathing.  Hives are on the arms and trunk and were in the groin yesterday.  It is quite itchy.    Allergies:  Allergies   Allergen Reactions     No Known Drug Allergies      Ancef [Cefazolin] Rash       Problem List:    Patient Active Problem List    Diagnosis Date Noted     MSSA bacteremia 06/03/2020     Priority: Medium     2/2 ivdu. Completed 4 weeks abx at [facility] on 6/3/20.        Bacteremia 05/05/2020     Priority: Medium     Fever 05/04/2020     Priority: Medium     Bacterial sepsis (H) 05/04/2020     Priority: Medium     Heroin addiction (H) 05/04/2020     Priority: Medium     IV drug abuse (H) 05/04/2020     Priority: Medium     Fever, unspecified fever cause 05/04/2020     Priority: Medium     Fever of unknown origin 05/04/2020     Priority: Medium     Thrombophlebitis arm 05/04/2020     Priority: Medium        Past Medical History:    History reviewed. No pertinent past medical history.    Past Surgical History:    History reviewed. No pertinent surgical history.    Family History:    Family History   Problem Relation Age of Onset     Cerebrovascular Disease Mother      Diabetes Maternal Grandfather      Asthma Maternal Grandfather        Social History:  Marital Status:  Single [1]  Social History     Tobacco Use     Smoking status: Current Some Day Smoker     Packs/day: 0.50     Smokeless tobacco: Current User     Tobacco comment: second hand exposure   Substance Use Topics     Alcohol use: Not Currently     Comment: occasional      Drug use: Not Currently     Frequency: 7.0 times per week     Types: Marijuana, Opiates     Comment: reports injecting 1-3.5grams of heroin daily         Medications:    diphenhydrAMINE (BENADRYL ALLERGY) 25 MG capsule  cloNIDine (CATAPRES) 0.1 MG tablet  ibuprofen (ADVIL/MOTRIN) 600 MG tablet  loperamide (IMODIUM) 2 MG capsule          Review of Systems  All other systems are reviewed and are negative    Physical Exam   BP: 124/83  Pulse: 100  Temp: 99.1  F (37.3  C)  Resp: 14  Weight: 107 kg (236 lb)  SpO2: 95 %      Physical Exam  Vitals signs and nursing note reviewed.   Constitutional:       General: He is not in acute distress.     Appearance: He is well-developed. He is not diaphoretic.   HENT:      Head: Normocephalic and atraumatic.   Eyes:      General: No scleral icterus.        Right eye: No discharge.         Left eye: No discharge.      Conjunctiva/sclera: Conjunctivae normal.   Neck:      Musculoskeletal: Normal range of motion and neck supple.   Cardiovascular:      Rate and Rhythm: Normal rate and regular rhythm.      Heart sounds: Normal heart sounds. No murmur.   Pulmonary:      Effort: Pulmonary effort is normal. No respiratory distress.      Breath sounds: Normal breath sounds. No stridor.   Abdominal:      Palpations: Abdomen is soft.      Tenderness: There is no abdominal tenderness.   Musculoskeletal: Normal range of motion.   Skin:     General: Skin is warm and dry.      Coloration: Skin is not pale.      Findings: Rash ( Diffuse hives to the arms and trunk.  No signs of secondary infection.) present. No erythema.   Neurological:      Mental Status: He is alert.      Cranial Nerves: No cranial nerve deficit.      Motor: No abnormal muscle tone.         ED Course        Procedures               Critical Care time:  none               No results found for this or any previous visit (from the past 24 hour(s)).    Medications   methylPREDNISolone sodium succinate (solu-MEDROL) injection 125 mg (has no administration in time range)       Assessments & Plan (with Medical Decision Making)  25-year-old male with with hives and suspected allergic  reaction to body wash.  Is given single IM dose of Solu-Medrol.  May use Benadryl over the next few days as needed for itching.  Return if any concern.     I have reviewed the nursing notes.    I have reviewed the findings, diagnosis, plan and need for follow up with the patient.       New Prescriptions    DIPHENHYDRAMINE (BENADRYL ALLERGY) 25 MG CAPSULE    Take 1-2 capsules (25-50 mg) by mouth every 4 hours as needed for itching or allergies       Final diagnoses:   Allergic reaction, initial encounter       7/5/2021   [facility] EMERGENCY DEPT     Giuseppe Pressley MD  07/05/21 5028

## 2021-07-05 NOTE — ED NOTES
Pt reports he lives at [facility] and is not allowed to have benadryl without a doctor order, he has not taken anything for the rash as he did not know what he could use that would be allowed at the facility, he needs an order for an anti itch or Benadryl type medication in order to take it so he came in

## 2021-08-26 ENCOUNTER — HOSPITAL ENCOUNTER (EMERGENCY)
Facility: CLINIC | Age: 26
Discharge: HOME | End: 2021-08-26
Attending: PHYSICIAN ASSISTANT | Admitting: PHYSICIAN ASSISTANT
Payer: COMMERCIAL

## 2021-08-26 VITALS
BODY MASS INDEX: 29.84 KG/M2 | SYSTOLIC BLOOD PRESSURE: 147 MMHG | RESPIRATION RATE: 16 BRPM | HEART RATE: 105 BPM | OXYGEN SATURATION: 100 % | TEMPERATURE: 97.1 F | WEIGHT: 220 LBS | DIASTOLIC BLOOD PRESSURE: 102 MMHG

## 2021-08-26 DIAGNOSIS — Z20.822 EXPOSURE TO COVID-19 VIRUS: ICD-10-CM

## 2021-08-26 LAB — SARS-COV-2 RNA RESP QL NAA+PROBE: NEGATIVE

## 2021-08-26 PROCEDURE — U0005 INFEC AGEN DETEC AMPLI PROBE: HCPCS | Performed by: PHYSICIAN ASSISTANT

## 2021-08-26 PROCEDURE — 99282 EMERGENCY DEPT VISIT SF MDM: CPT | Performed by: PHYSICIAN ASSISTANT

## 2021-08-26 PROCEDURE — C9803 HOPD COVID-19 SPEC COLLECT: HCPCS

## 2021-08-26 PROCEDURE — 99283 EMERGENCY DEPT VISIT LOW MDM: CPT

## 2021-08-26 NOTE — ED PROVIDER NOTES
History     Chief Complaint   Patient presents with     Covid Concern     HPI  Jcarlos Maravilla is a 26 year old male who presents for evaluation of exposure to COVID-19.  He states that he does not have any symptoms at all.  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  His workplace sent him to the emergency department to be tested for COVID-19.  Patient states that he did have the COVID-19 vaccine which he finished the booster 1.5 months ago.  Has not had COVID-19 to this point.  He denies fever, chills, rhinorrhea, congestion, sore throat, change in taste/smell sensation, cough, dyspnea, chest pain, palpitations, abdominal pain, nausea, vomiting, diarrhea, hematochezia, dysuria, frequency, urgency, flank pain, or lower extremity edema/calf pain.          Allergies:  Allergies   Allergen Reactions     No Known Drug Allergies      Ancef [Cefazolin] Rash       Problem List:    Patient Active Problem List    Diagnosis Date Noted     MSSA bacteremia 06/03/2020     Priority: Medium     2/2 ivdu. Completed 4 weeks abx at [facility] on 6/3/20.        Bacteremia 05/05/2020     Priority: Medium     Fever 05/04/2020     Priority: Medium     Bacterial sepsis (H) 05/04/2020     Priority: Medium     Heroin addiction (H) 05/04/2020     Priority: Medium     IV drug abuse (H) 05/04/2020     Priority: Medium     Fever, unspecified fever cause 05/04/2020     Priority: Medium     Fever of unknown origin 05/04/2020     Priority: Medium     Thrombophlebitis arm 05/04/2020     Priority: Medium        Past Medical History:    No past medical history on file.    Past Surgical History:    No past surgical history on file.    Family History:    Family History   Problem Relation Age of Onset     Cerebrovascular Disease Mother      Diabetes Maternal Grandfather      Asthma Maternal Grandfather        Social History:  Marital Status:  Single [1]  Social History     Tobacco Use     Smoking status: Current  Some Day Smoker     Packs/day: 0.50     Smokeless tobacco: Current User     Tobacco comment: second hand exposure   Substance Use Topics     Alcohol use: Not Currently     Comment: occasional      Drug use: Not Currently     Frequency: 7.0 times per week     Types: Marijuana, Opiates     Comment: reports injecting 1-3.5grams of heroin daily        Medications:    cloNIDine (CATAPRES) 0.1 MG tablet  diphenhydrAMINE (BENADRYL ALLERGY) 25 MG capsule  ibuprofen (ADVIL/MOTRIN) 600 MG tablet  loperamide (IMODIUM) 2 MG capsule          Review of Systems   All other systems reviewed and are negative.      Physical Exam   BP: (!) 147/102  Pulse: 105  Temp: 97.1  F (36.2  C)  Resp: 16  Weight: 99.8 kg (220 lb)  SpO2: 100 %      Physical Exam  Vitals and nursing note reviewed.   Constitutional:       General: He is not in acute distress.     Appearance: He is not diaphoretic.   HENT:      Head: Normocephalic and atraumatic.      Right Ear: Tympanic membrane, ear canal and external ear normal.      Left Ear: Tympanic membrane, ear canal and external ear normal.      Nose: Nose normal. No congestion or rhinorrhea.      Mouth/Throat:      Mouth: Mucous membranes are moist.      Pharynx: No oropharyngeal exudate or posterior oropharyngeal erythema.   Eyes:      General: No scleral icterus.        Right eye: No discharge.         Left eye: No discharge.      Conjunctiva/sclera: Conjunctivae normal.      Pupils: Pupils are equal, round, and reactive to light.   Neck:      Thyroid: No thyromegaly.   Cardiovascular:      Rate and Rhythm: Normal rate and regular rhythm.      Heart sounds: Normal heart sounds. No murmur heard.     Pulmonary:      Effort: Pulmonary effort is normal. No respiratory distress.      Breath sounds: Normal breath sounds. No wheezing or rales.   Chest:      Chest wall: No tenderness.   Abdominal:      General: Bowel sounds are normal. There is no distension.      Palpations: Abdomen is soft. There is no mass.       Tenderness: There is no abdominal tenderness. There is no right CVA tenderness, left CVA tenderness, guarding or rebound.   Musculoskeletal:         General: No swelling, tenderness or deformity. Normal range of motion.      Cervical back: Normal range of motion and neck supple.      Right lower leg: No edema.      Left lower leg: No edema.   Lymphadenopathy:      Cervical: No cervical adenopathy.   Skin:     General: Skin is warm and dry.      Capillary Refill: Capillary refill takes less than 2 seconds.      Coloration: Skin is not jaundiced or pale.      Findings: No bruising, erythema, lesion or rash.   Neurological:      General: No focal deficit present.      Mental Status: He is alert and oriented to person, place, and time.      Cranial Nerves: No cranial nerve deficit.   Psychiatric:         Mood and Affect: Mood normal.         Behavior: Behavior normal.         Thought Content: Thought content normal.         ED Course        Procedures              Critical Care time:  none               No results found for this or any previous visit (from the past 24 hour(s)).    Medications - No data to display    Assessments & Plan (with Medical Decision Making)  Exposure to COVID-19 virus       26 year old male presents for evaluation of exposure to COVID-19 at his workplace.  His work sent him for test.  He is asymptomatic.  Normal exam.  COVID-19 swab performed.  Discussed that he should not return to work until this returns negative.  He will monitor the results on [employer].  Note provided for him for his workplace.  Indications for return reviewed.  The patient was in agreement this plan and was suitable for discharge.     I have reviewed the nursing notes.    I have reviewed the findings, diagnosis, plan and need for follow up with the patient.       New Prescriptions    No medications on file       Final diagnoses:   Exposure to COVID-19 virus     Disclaimer: This note consists of symbols derived from  keyboarding, dictation and/or voice recognition software. As a result, there may be errors in the script that have gone undetected. Please consider this when interpreting information found in this chart.      8/26/2021   [facility] EMERGENCY DEPT     Joe Champagne PA-C  08/26/21 8401

## 2021-08-26 NOTE — DISCHARGE INSTRUCTIONS
Your results should return in the next 8-16 hours.  Please monitor [employer] for this result.  Please do not return to work until your result returns negative.

## 2021-08-26 NOTE — LETTER
August 26, 2021      To Whom It May Concern:      Jcarlos Maravilla was seen in our Emergency Department today, 08/26/21. Jcarlos had a COVID-19 swab performed here in the emergency department.  These results should return electronically in the next 8-16 hours.  Jcarlos will be able to show that result to you on his phone.  He does not have any symptoms of COVID-19 at this time.  Exam was normal as well.      Sincerely,            Joe Champagne PA-C

## 2021-08-28 ENCOUNTER — HEALTH MAINTENANCE LETTER (OUTPATIENT)
Age: 26
End: 2021-08-28

## 2021-10-23 ENCOUNTER — HEALTH MAINTENANCE LETTER (OUTPATIENT)
Age: 26
End: 2021-10-23

## 2022-10-09 ENCOUNTER — HEALTH MAINTENANCE LETTER (OUTPATIENT)
Age: 27
End: 2022-10-09

## 2023-10-28 ENCOUNTER — HEALTH MAINTENANCE LETTER (OUTPATIENT)
Age: 28
End: 2023-10-28

## 2024-08-02 NOTE — PHARMACY-VANCOMYCIN DOSING SERVICE
Pharmacy Vancomycin Note  Date of Service May 22, 2020  Patient's  1995   24 year old, male    Indication: Bacteremia - MSSA w/SKYE negative for vegetation   Goal Trough Level: 10-15 mg/L Previously targeting 15-20 mg/L.  Lowing goal given MRSA has been ruled out  Day of Therapy: 9  Current Vancomycin regimen:  1500 mg IV q8h    Current estimated CrCl = Estimated Creatinine Clearance: 212.8 mL/min (based on SCr of 0.67 mg/dL).    Creatinine for last 3 days  2020:  6:27 AM Creatinine 0.67 mg/dL  2020:  6:33 AM Creatinine 0.67 mg/dL    Recent Vancomycin Levels (past 3 days)  2020:  2:47 PM Vancomycin Level 15.0 mg/L    Vancomycin IV Administrations (past 72 hours)                   vancomycin 1500 mg in 0.9% NaCl 250 ml intermittent infusion 1,500 mg (mg) 1,500 mg Given 20 0853     1,500 mg Given  0054     1,500 mg Given 20 1524     1,500 mg Given  0853     1,500 mg Given 20 2317     1,500 mg Given  1643     1,500 mg Given  0815     1,500 mg Given 20 2314     1,500 mg Given  1618                Nephrotoxins and other renal medications (From now, onward)    Start     Dose/Rate Route Frequency Ordered Stop    20 0600  vancomycin 1500 mg in 0.9% NaCl 250 ml intermittent infusion 1,500 mg      1,500 mg  over 90 Minutes Intravenous EVERY 8 HOURS 20 0030               Contrast Orders - past 72 hours (72h ago, onward)    None          Interpretation of levels and current regimen:  Trough level is  Therapeutic    Has serum creatinine changed > 50% in last 72 hours: No    Urine output:  unable to determine    Renal Function: Stable    Plan:  1.  Continue Current Dose  2.  Pharmacy will check trough levels as appropriate in 1-3 Days.    3. Serum creatinine levels will be ordered a minimum of twice weekly.      Wandy Alonzo RPH        .     risk factors

## 2025-05-15 ENCOUNTER — HOSPITAL ENCOUNTER (EMERGENCY)
Facility: CLINIC | Age: 30
End: 2025-05-15
Attending: PHYSICIAN ASSISTANT
Payer: MEDICAID

## 2025-05-15 ENCOUNTER — APPOINTMENT (OUTPATIENT)
Dept: CT IMAGING | Facility: CLINIC | Age: 30
End: 2025-05-15
Attending: PHYSICIAN ASSISTANT
Payer: MEDICAID

## 2025-05-15 DIAGNOSIS — K04.7 DENTAL ABSCESS: ICD-10-CM

## 2025-05-15 LAB
ANION GAP SERPL CALCULATED.3IONS-SCNC: 10 MMOL/L (ref 7–15)
BASOPHILS # BLD AUTO: 0.1 10E3/UL (ref 0–0.2)
BASOPHILS NFR BLD AUTO: 1 %
BUN SERPL-MCNC: 10 MG/DL (ref 6–20)
CALCIUM SERPL-MCNC: 9.8 MG/DL (ref 8.8–10.4)
CHLORIDE SERPL-SCNC: 101 MMOL/L (ref 98–107)
CREAT SERPL-MCNC: 0.97 MG/DL (ref 0.67–1.17)
EGFRCR SERPLBLD CKD-EPI 2021: >90 ML/MIN/1.73M2
ELLIPTOCYTES BLD QL SMEAR: SLIGHT
EOSINOPHIL # BLD AUTO: 0.2 10E3/UL (ref 0–0.7)
EOSINOPHIL NFR BLD AUTO: 2 %
ERYTHROCYTE [DISTWIDTH] IN BLOOD BY AUTOMATED COUNT: 19.7 % (ref 10–15)
GLUCOSE SERPL-MCNC: 110 MG/DL (ref 70–99)
HCO3 SERPL-SCNC: 27 MMOL/L (ref 22–29)
HCT VFR BLD AUTO: 44.9 % (ref 40–53)
HGB BLD-MCNC: 14 G/DL (ref 13.3–17.7)
IMM GRANULOCYTES # BLD: 0 10E3/UL
IMM GRANULOCYTES NFR BLD: 0 %
LACTATE SERPL-SCNC: 1.7 MMOL/L (ref 0.7–2)
LYMPHOCYTES # BLD AUTO: 1.6 10E3/UL (ref 0.8–5.3)
LYMPHOCYTES NFR BLD AUTO: 22 %
MCH RBC QN AUTO: 17.8 PG (ref 26.5–33)
MCHC RBC AUTO-ENTMCNC: 31.2 G/DL (ref 31.5–36.5)
MCV RBC AUTO: 57 FL (ref 78–100)
MONOCYTES # BLD AUTO: 0.8 10E3/UL (ref 0–1.3)
MONOCYTES NFR BLD AUTO: 10 %
NEUTROPHILS # BLD AUTO: 4.9 10E3/UL (ref 1.6–8.3)
NEUTROPHILS NFR BLD AUTO: 65 %
NRBC # BLD AUTO: 0 10E3/UL
NRBC BLD AUTO-RTO: 0 /100
PLAT MORPH BLD: ABNORMAL
PLATELET # BLD AUTO: 339 10E3/UL (ref 150–450)
POTASSIUM SERPL-SCNC: 3.5 MMOL/L (ref 3.4–5.3)
RBC # BLD AUTO: 7.88 10E6/UL (ref 4.4–5.9)
RBC MORPH BLD: ABNORMAL
SODIUM SERPL-SCNC: 138 MMOL/L (ref 135–145)
TARGETS BLD QL SMEAR: SLIGHT
WBC # BLD AUTO: 7.6 10E3/UL (ref 4–11)

## 2025-05-15 PROCEDURE — 250N000009 HC RX 250: Performed by: PHYSICIAN ASSISTANT

## 2025-05-15 PROCEDURE — 80048 BASIC METABOLIC PNL TOTAL CA: CPT | Performed by: PHYSICIAN ASSISTANT

## 2025-05-15 PROCEDURE — 36415 COLL VENOUS BLD VENIPUNCTURE: CPT | Performed by: PHYSICIAN ASSISTANT

## 2025-05-15 PROCEDURE — 83605 ASSAY OF LACTIC ACID: CPT | Performed by: PHYSICIAN ASSISTANT

## 2025-05-15 PROCEDURE — 41800 DRAINAGE OF GUM LESION: CPT | Performed by: PHYSICIAN ASSISTANT

## 2025-05-15 PROCEDURE — 87040 BLOOD CULTURE FOR BACTERIA: CPT | Performed by: PHYSICIAN ASSISTANT

## 2025-05-15 PROCEDURE — 250N000011 HC RX IP 250 OP 636: Performed by: PHYSICIAN ASSISTANT

## 2025-05-15 PROCEDURE — 258N000003 HC RX IP 258 OP 636: Performed by: PHYSICIAN ASSISTANT

## 2025-05-15 PROCEDURE — 99284 EMERGENCY DEPT VISIT MOD MDM: CPT | Mod: 25 | Performed by: PHYSICIAN ASSISTANT

## 2025-05-15 PROCEDURE — 85025 COMPLETE CBC W/AUTO DIFF WBC: CPT | Performed by: PHYSICIAN ASSISTANT

## 2025-05-15 PROCEDURE — 70491 CT SOFT TISSUE NECK W/DYE: CPT

## 2025-05-15 PROCEDURE — 96365 THER/PROPH/DIAG IV INF INIT: CPT | Mod: 59 | Performed by: PHYSICIAN ASSISTANT

## 2025-05-15 PROCEDURE — 99285 EMERGENCY DEPT VISIT HI MDM: CPT | Mod: 25 | Performed by: PHYSICIAN ASSISTANT

## 2025-05-15 RX ORDER — IOPAMIDOL 755 MG/ML
500 INJECTION, SOLUTION INTRAVASCULAR ONCE
Status: COMPLETED | OUTPATIENT
Start: 2025-05-15 | End: 2025-05-15

## 2025-05-15 RX ORDER — AMPICILLIN AND SULBACTAM 2; 1 G/1; G/1
3 INJECTION, POWDER, FOR SOLUTION INTRAMUSCULAR; INTRAVENOUS ONCE
Status: COMPLETED | OUTPATIENT
Start: 2025-05-15 | End: 2025-05-15

## 2025-05-15 RX ADMIN — SODIUM CHLORIDE 1000 ML: 0.9 INJECTION, SOLUTION INTRAVENOUS at 22:43

## 2025-05-15 RX ADMIN — IOPAMIDOL 90 ML: 755 INJECTION, SOLUTION INTRAVENOUS at 23:39

## 2025-05-15 RX ADMIN — AMPICILLIN SODIUM AND SULBACTAM SODIUM 3 G: 2; 1 INJECTION, POWDER, FOR SOLUTION INTRAMUSCULAR; INTRAVENOUS at 22:55

## 2025-05-15 RX ADMIN — SODIUM CHLORIDE 70 ML: 9 INJECTION, SOLUTION INTRAVENOUS at 23:39

## 2025-05-15 ASSESSMENT — ACTIVITIES OF DAILY LIVING (ADL)
ADLS_ACUITY_SCORE: 48

## 2025-05-15 ASSESSMENT — COLUMBIA-SUICIDE SEVERITY RATING SCALE - C-SSRS
2. HAVE YOU ACTUALLY HAD ANY THOUGHTS OF KILLING YOURSELF IN THE PAST MONTH?: NO
6. HAVE YOU EVER DONE ANYTHING, STARTED TO DO ANYTHING, OR PREPARED TO DO ANYTHING TO END YOUR LIFE?: NO
1. IN THE PAST MONTH, HAVE YOU WISHED YOU WERE DEAD OR WISHED YOU COULD GO TO SLEEP AND NOT WAKE UP?: NO

## 2025-05-16 VITALS
HEIGHT: 72 IN | SYSTOLIC BLOOD PRESSURE: 124 MMHG | OXYGEN SATURATION: 97 % | TEMPERATURE: 98.7 F | HEART RATE: 109 BPM | WEIGHT: 224.5 LBS | DIASTOLIC BLOOD PRESSURE: 93 MMHG | RESPIRATION RATE: 17 BRPM | BODY MASS INDEX: 30.41 KG/M2

## 2025-05-16 LAB
HOLD SPECIMEN: NORMAL
HOLD SPECIMEN: NORMAL

## 2025-05-16 NOTE — ED PROVIDER NOTES
History     Chief Complaint   Patient presents with    Dental Pain     HPI  Jcarlos Maravilla is a 29 year old male who presents for evaluation of increasing left upper jawline dental discomfort over the past week.  Much worse today.  He started to notice some facial swelling.  He questions if he has some pus coming from the tooth area.  Reports a previous injury years ago.  Does admit to street drug use 2 days ago.  Also takes Suboxone.  Denies any fever, chills, nausea, or vomiting.  Denies any URI symptoms such as sore throat, rhinorrhea, congestion, cough, chest pain, or dyspnea.  Denies any dysuria, frequency, urgency, flank pain, or gross hematuria.  Has taken ibuprofen and Tylenol for the pain with limited success.  Pain is currently rated 7 on a scale of 10.  No trauma to the region.    Allergies:  Allergies   Allergen Reactions    No Known Drug Allergy     Ancef [Cefazolin] Rash    Vancomycin Rash       Problem List:    Patient Active Problem List    Diagnosis Date Noted    MSSA bacteremia 06/03/2020     Priority: Medium     2/2 ivdu. Completed 4 weeks abx at [facility] on 6/3/20.       Bacteremia 05/05/2020     Priority: Medium    Fever 05/04/2020     Priority: Medium    Bacterial sepsis (H) 05/04/2020     Priority: Medium    Heroin addiction (H) 05/04/2020     Priority: Medium    IV drug abuse (H) 05/04/2020     Priority: Medium    Fever, unspecified fever cause 05/04/2020     Priority: Medium    Fever of unknown origin 05/04/2020     Priority: Medium    Thrombophlebitis arm 05/04/2020     Priority: Medium        Past Medical History:    No past medical history on file.    Past Surgical History:    No past surgical history on file.    Family History:    Family History   Problem Relation Age of Onset    Cerebrovascular Disease Mother     Diabetes Maternal Grandfather     Asthma Maternal Grandfather        Social History:  Marital Status:  Single [1]  Social History     Tobacco Use    Smoking status: Some Days      Current packs/day: 0.50     Types: Cigarettes    Smokeless tobacco: Current    Tobacco comments:     second hand exposure   Substance Use Topics    Alcohol use: Not Currently     Comment: occasional     Drug use: Not Currently     Frequency: 7.0 times per week     Types: Marijuana, Opiates     Comment: reports injecting 1-3.5grams of heroin daily        Medications:    amoxicillin-clavulanate (AUGMENTIN) 875-125 MG tablet  cloNIDine (CATAPRES) 0.1 MG tablet  diphenhydrAMINE (BENADRYL ALLERGY) 25 MG capsule  ibuprofen (ADVIL/MOTRIN) 600 MG tablet  loperamide (IMODIUM) 2 MG capsule          Review of Systems   All other systems reviewed and are negative.      Physical Exam   BP: (!) 142/115  Pulse: (!) 130  Temp: 98  F (36.7  C)  Resp: 20  Height: 182.9 cm (6')  Weight: 101.8 kg (224 lb 8 oz)  SpO2: 96 %      Physical Exam  Vitals and nursing note reviewed.   Constitutional:       General: He is not in acute distress.     Appearance: He is not diaphoretic.   HENT:      Head: Normocephalic and atraumatic.      Comments: Very mild soft tissue facial swelling of the left maxillary area.  No induration or fluctuance.  No erythema.  No trismus or malocclusion.     Right Ear: Tympanic membrane, ear canal and external ear normal.      Left Ear: Tympanic membrane, ear canal and external ear normal.      Nose: Nose normal. No congestion or rhinorrhea.      Mouth/Throat:      Mouth: Mucous membranes are moist.      Pharynx: No oropharyngeal exudate or posterior oropharyngeal erythema.      Comments: Dental decay of the left high tooth down to the gingiva of the left upper jaw.  There is significant tenderness to palpation of that region.  It does appear that there is a little bit of pus in that area.  I do not identify an and sizable abscess of the upper gingiva.  Palpation of this area does reproduce his pain pattern.  Eyes:      General: No scleral icterus.        Right eye: No discharge.         Left eye: No discharge.       Conjunctiva/sclera: Conjunctivae normal.      Pupils: Pupils are equal, round, and reactive to light.   Neck:      Thyroid: No thyromegaly.      Comments: No soft tissue neck swelling.  Cardiovascular:      Rate and Rhythm: Normal rate and regular rhythm.      Heart sounds: Normal heart sounds. No murmur heard.  Pulmonary:      Effort: Pulmonary effort is normal. No respiratory distress.      Breath sounds: Normal breath sounds. No wheezing or rales.   Chest:      Chest wall: No tenderness.   Abdominal:      General: Bowel sounds are normal. There is no distension.      Palpations: Abdomen is soft. There is no mass.      Tenderness: There is no abdominal tenderness. There is no guarding or rebound.   Musculoskeletal:         General: No tenderness or deformity. Normal range of motion.      Cervical back: Normal range of motion and neck supple. No rigidity or tenderness.   Lymphadenopathy:      Cervical: No cervical adenopathy.   Skin:     General: Skin is warm and dry.      Capillary Refill: Capillary refill takes less than 2 seconds.      Findings: No erythema or rash.   Neurological:      Mental Status: He is alert and oriented to person, place, and time.      Cranial Nerves: No cranial nerve deficit.   Psychiatric:         Behavior: Behavior normal.         Thought Content: Thought content normal.         ED [facility]    PROCEDURE: -Incision/Drainage    Date/Time: 5/16/2025 12:46 AM    Performed by: Joe Champagne PA-C  Authorized by: Joe Champagne PA-C    Risks, benefits and alternatives discussed.      LOCATION:      Type:  Abscess    Size:  1 cm    Location:  Mouth    Mouth location: periapical.    PROCEDURE TYPE:     Complexity:  Simple    ANESTHESIA (see MAR for exact dosages):     Anesthesia method:  Topical application    Topical anesthetic:  Lidocaine gel    PROCEDURE DETAILS:     Incision types:  Stab incision    Incision depth:  Dermal     Scalpel blade:  11    Drainage:  Bloody and purulent    Drainage amount:  Scant    Wound treatment:  Wound left open    PROCEDURE    Patient Tolerance:  Patient tolerated the procedure well with no immediate complications                Critical Care time:  none     IV Antibiotics given and/or elevated Lactate of 1.7 and no sepsis note found - Delete this reminder and enter the sepsis note or '.edcms' before signing chart.>>>None         Results for orders placed or performed during the hospital encounter of 05/15/25 (from the past 24 hours)   CBC with platelets differential    Narrative    The following orders were created for panel order CBC with platelets differential.  Procedure                               Abnormality         Status                     ---------                               -----------         ------                     CBC with platelets and ...[[id number]]  Abnormal            Final result               RBC and Platelet Morpho...[[id number]]  Abnormal            Final result                 Please view results for these tests on the individual orders.   Basic metabolic panel   Result Value Ref Range    Sodium 138 135 - 145 mmol/L    Potassium 3.5 3.4 - 5.3 mmol/L    Chloride 101 98 - 107 mmol/L    Carbon Dioxide (CO2) 27 22 - 29 mmol/L    Anion Gap 10 7 - 15 mmol/L    Urea Nitrogen 10.0 6.0 - 20.0 mg/dL    Creatinine 0.97 0.67 - 1.17 mg/dL    GFR Estimate >90 >60 mL/min/1.73m2    Calcium 9.8 8.8 - 10.4 mg/dL    Glucose 110 (H) 70 - 99 mg/dL   Lactic acid whole blood with 1x repeat in 2 hr when >2   Result Value Ref Range    Lactic Acid, Initial 1.7 0.7 - 2.0 mmol/L   CBC with platelets and differential   Result Value Ref Range    WBC Count 7.6 4.0 - 11.0 10e3/uL    RBC Count 7.88 (H) 4.40 - 5.90 10e6/uL    Hemoglobin 14.0 13.3 - 17.7 g/dL    Hematocrit 44.9 40.0 - 53.0 %    MCV 57 (L) 78 - 100 fL    MCH 17.8 (L) 26.5 - 33.0 pg    MCHC 31.2 (L) 31.5 - 36.5 g/dL    RDW 19.7 (H) 10.0 - 15.0 %     Platelet Count 339 150 - 450 10e3/uL    % Neutrophils 65 %    % Lymphocytes 22 %    % Monocytes 10 %    % Eosinophils 2 %    % Basophils 1 %    % Immature Granulocytes 0 %    NRBCs per 100 WBC 0 <1 /100    Absolute Neutrophils 4.9 1.6 - 8.3 10e3/uL    Absolute Lymphocytes 1.6 0.8 - 5.3 10e3/uL    Absolute Monocytes 0.8 0.0 - 1.3 10e3/uL    Absolute Eosinophils 0.2 0.0 - 0.7 10e3/uL    Absolute Basophils 0.1 0.0 - 0.2 10e3/uL    Absolute Immature Granulocytes 0.0 <=0.4 10e3/uL    Absolute NRBCs 0.0 10e3/uL   RBC and Platelet Morphology   Result Value Ref Range    RBC Morphology Confirmed RBC Indices     Platelet Assessment  Automated Count Confirmed. Platelet morphology is normal.     Automated Count Confirmed. Platelet morphology is normal.    Elliptocytes Slight (A) None Seen    Target Cells Slight (A) None Seen   Extra Tube    Narrative    The following orders were created for panel order Extra Tube.  Procedure                               Abnormality         Status                     ---------                               -----------         ------                     Extra Green Top (Lithiu...[[id number]]                      Final result               Extra Purple Top Tube[[id number]]                           Final result                 Please view results for these tests on the individual orders.   Extra Green Top (Lithium Heparin) Tube   Result Value Ref Range    Hold Specimen JIC    Extra Purple Top Tube   Result Value Ref Range    Hold Specimen JIC    Soft tissue neck CT w contrast    Narrative    EXAM: CT SOFT TISSUE NECK W CONTRAST  LOCATION: [facility]  DATE: 5/15/2025    INDICATION: left maxillary area facial swelling and pain, concern for abscess  COMPARISON: None.  CONTRAST: 90mL Isovue 370  TECHNIQUE: Routine CT Soft Tissue Neck with IV contrast. Multiplanar reformats. Dose reduction techniques were used. Region of interest marker was utilized.    FINDINGS:      MUCOSAL SPACES/SOFT TISSUES: Large periapical lucency surrounding the left maxillary first bicuspid with prominent overlying inflammatory stranding. No associated subperiosteal abscess identified. Multiple additional periapical lucencies involving the   left second bicuspid, left maxillary most posterior molar, as well as the left mandibular first and second molars. Normal mucosal spaces of the upper aerodigestive tract. No mucosal mass or inflammation identified. Normal vocal cords and infraglottic   trachea. Vocal cords are in apposition limiting evaluation for small masses.  Normal parapharyngeal space and subcutaneous soft tissues. Normal oral cavity,  spaces, and floor of mouth structures.    LYMPH NODES: No pathologic lymph nodes by size or morphology criteria.     SALIVARY GLANDS: Normal parotid and submandibular glands.    THYROID: Normal.     VESSELS: Vascular structures of the neck are patent.    VISUALIZED INTRACRANIAL/ORBITS/SINUSES: No abnormality of the visualized intracranial compartment or orbits. Polypoid mucosal thickening along the floor of the bilateral maxillary sinuses. This is potentially odontogenic on the left given the adjacent   periodontal disease.     OTHER: No destructive osseous lesion. The included lung apices are clear.      Impression    IMPRESSION:   1.  Large periapical lucency surrounding the left maxillary first bicuspid with prominent overlying inflammatory stranding. No associated subperiosteal abscess identified.  2.  Multiple additional periapical lucencies involving the left second bicuspid, left maxillary most posterior molar, as well as the left mandibular first and second molars.  3.  Polypoid mucosal thickening along the floor of the bilateral maxillary sinuses. This is potentially odontogenic on the left given the adjacent periodontal disease.          Medications   sodium chloride 0.9% BOLUS 1,000 mL (0 mLs Intravenous Stopped 5/15/25 0806)    ampicillin-sulbactam (UNASYN) 3 g vial to attach to  mL bag (0 g Intravenous Stopped 5/15/25 2343)   iopamidol (ISOVUE-370) solution 500 mL (90 mLs Intravenous $Given 5/15/25 2339)   sodium chloride 0.9 % bag for CT scan flush (70 mLs Intravenous $Given 5/15/25 2339)       Assessments & Plan (with Medical Decision Making)  Dental abscess     29 year old male presents for evaluation of increasing dental discomfort over the past week and much worse today.  He wonders if there is some pus coming into his mouth.  No fever, chills, nausea, or vomiting.  Does have a history of street drug use.  Also takes Suboxone.  See HPI above for details.  BP (!) 124/93   Pulse 109   Temp 98.7  F (37.1  C) (Oral)   Resp 17   Ht 1.829 m (6')   Wt 101.8 kg (224 lb 8 oz)   SpO2 97%   BMI 30.45 kg/m     Generally healthy-appearing male in no acute distress.  He does have some very mild soft tissue facial swelling of the left maxillary region.  Extensive dental decay down to the gingiva of the IV tooth of the left upper jaw.  Palpation of this area recreates his pain pattern.  I question if there is some small amount of pus around the region.  No incisable abscess noted right away.  No trismus or malocclusion.  No soft tissue neck swelling.  No significant cervical adenopathy.  Remainder of the exam is otherwise negative.  Given his abnormal vital signs and evidence for infection, we did proceed with further workup.  IV was established.  White blood cell count normal at 7600.  Lactic acid level normal at 1.7.  Basic metabolic panel all within normal limits.  CT soft tissue neck with notable periapical abscess and other small abscesses.  I performed independent review of the CT and agree with the findings.  The largest 1 is above the tooth in question.  Therefore, I think it is a good idea to incise and drain this area.    I reviewed the procedure with the patient in detail.  After reviewing risks and benefits, he wanted to  proceed.  We used topical lidocaine over the area for 10 minutes and then used an 11 blade to open up the skin along the gingiva.  A fair amount of pus came out.  We had him rinse and spit water.  Also had him apply heating pad to the area.  Will treat aggressively with Augmentin p.o.  He was given a dose of IV Unasyn here in the ED prior to discharge.  Warm compresses to be applied to the painful area for 20 minutes every 1-2 hours.  Proper dosing for ibuprofen and Tylenol reviewed.  Recommended that he not use street drugs moving forward.  He needs to see a dentist in the next 2-3 weeks, as this tooth will continually be an issue for him.  Indications for ED return reviewed with him in detail.  Patient was in agreement with this plan and was suitable for discharge.  His vital signs were much improved at the end of his visit.  When I last saw the patient his heart rate was 99 bpm and blood pressure was 124/93.  He responded well to IV fluid management.     I have reviewed the nursing notes.    I have reviewed the findings, diagnosis, plan and need for follow up with the patient.           Medical Decision Making  The patient's presentation was of moderate complexity (an acute illness with systemic symptoms).    The patient's evaluation involved:  ordering and/or review of 3+ test(s) in this encounter (see separate area of note for details)    The patient's management necessitated moderate risk (prescription drug management including medications given in the ED) and moderate risk (a decision regarding minor procedure (incision & drainage) with risk factors of none).        New Prescriptions    AMOXICILLIN-CLAVULANATE (AUGMENTIN) 875-125 MG TABLET    Take 1 tablet by mouth 2 times daily.       Final diagnoses:   Dental abscess     Disclaimer: This note consists of symbols derived from keyboarding, dictation and/or voice recognition software. As a result, there may be errors in the script that have gone undetected.  Please consider this when interpreting information found in this chart.      5/15/2025   [facility] EMERGENCY DEPT       Joe Champagne PA-C  05/16/25 0048

## 2025-05-16 NOTE — ED TRIAGE NOTES
Comes in with left upper dental pain with slight swelling that has worsened today.      Triage Assessment (Adult)       Row Name 05/15/25 2049          Triage Assessment    Airway WDL WDL        Respiratory WDL    Respiratory WDL WDL        Skin Circulation/Temperature WDL    Skin Circulation/Temperature WDL WDL        Cognitive/Neuro/Behavioral WDL    Cognitive/Neuro/Behavioral WDL WDL

## 2025-05-16 NOTE — DISCHARGE INSTRUCTIONS
It was a pleasure working with you today!  I hope your condition improves rapidly!     Thankfully, good amount of pus came out of the abscess above the decayed tooth.  Continue to swish and spit warm water for the next half hour.  Use a heating pad over the upper face area for 20 minutes every 1-2 hours while you are awake.  Use ibuprofen 600 mg every 6 hours needed for pain.  You can use Tylenol 1000 mg every 6 hours as needed for pain.  Take your first dose of pill antibiotics in 6 hours.  Then, take it twice daily.  Take a probiotic to prevent stomach upset or diarrhea while on antibiotic therapy.  Please see a dentist within the next 2-3 weeks for long-term management of this tooth, as it will be a recurrent problem.  Return to the emergency department if you develop worsening facial/neck swelling, fever over 100.5, or vomiting.

## 2025-05-16 NOTE — ED PROVIDER NOTES
History     Chief Complaint   Patient presents with     Dental Pain     HPI  Jcarlos Maravilla is a 29 year old male who presents for evaluation of increasing left upper jawline dental discomfort over the past week.  Much worse today.  He started to notice some facial swelling.  He questions if he has some pus coming from the tooth area.  Reports a previous injury years ago.  Does admit to street drug use 2 days ago.  Also takes Suboxone.  Denies any fever, chills, nausea, or vomiting.  Denies any URI symptoms such as sore throat, rhinorrhea, congestion, cough, chest pain, or dyspnea.  Denies any dysuria, frequency, urgency, flank pain, or gross hematuria.  Has taken ibuprofen and Tylenol for the pain with limited success.  Pain is currently rated 7 on a scale of 10.  No trauma to the region.    Allergies:  Allergies   Allergen Reactions     No Known Drug Allergy      Ancef [Cefazolin] Rash     Vancomycin Rash       Problem List:    Patient Active Problem List    Diagnosis Date Noted     MSSA bacteremia 06/03/2020     Priority: Medium     2/2 ivdu. Completed 4 weeks abx at [facility] on 6/3/20.        Bacteremia 05/05/2020     Priority: Medium     Fever 05/04/2020     Priority: Medium     Bacterial sepsis (H) 05/04/2020     Priority: Medium     Heroin addiction (H) 05/04/2020     Priority: Medium     IV drug abuse (H) 05/04/2020     Priority: Medium     Fever, unspecified fever cause 05/04/2020     Priority: Medium     Fever of unknown origin 05/04/2020     Priority: Medium     Thrombophlebitis arm 05/04/2020     Priority: Medium        Past Medical History:    No past medical history on file.    Past Surgical History:    No past surgical history on file.    Family History:    Family History   Problem Relation Age of Onset     Cerebrovascular Disease Mother      Diabetes Maternal Grandfather      Asthma Maternal Grandfather        Social History:  Marital Status:  Single [1]  Social History     Tobacco Use     Smoking  status: Some Days     Current packs/day: 0.50     Types: Cigarettes     Smokeless tobacco: Current     Tobacco comments:     second hand exposure   Substance Use Topics     Alcohol use: Not Currently     Comment: occasional      Drug use: Not Currently     Frequency: 7.0 times per week     Types: Marijuana, Opiates     Comment: reports injecting 1-3.5grams of heroin daily        Medications:    cloNIDine (CATAPRES) 0.1 MG tablet  diphenhydrAMINE (BENADRYL ALLERGY) 25 MG capsule  ibuprofen (ADVIL/MOTRIN) 600 MG tablet  loperamide (IMODIUM) 2 MG capsule          Review of Systems   All other systems reviewed and are negative.      Physical Exam   BP: (!) 142/115  Pulse: (!) 130  Temp: 98  F (36.7  C)  Resp: 20  Height: 182.9 cm (6')  Weight: 101.8 kg (224 lb 8 oz)  SpO2: 96 %      Physical Exam  Vitals and nursing note reviewed.   Constitutional:       General: He is not in acute distress.     Appearance: He is not diaphoretic.   HENT:      Head: Normocephalic and atraumatic.      Comments: Very mild soft tissue facial swelling of the left maxillary area.  No induration or fluctuance.  No erythema.  No trismus or malocclusion.     Right Ear: Tympanic membrane, ear canal and external ear normal.      Left Ear: Tympanic membrane, ear canal and external ear normal.      Nose: Nose normal. No congestion or rhinorrhea.      Mouth/Throat:      Mouth: Mucous membranes are moist.      Pharynx: No oropharyngeal exudate or posterior oropharyngeal erythema.      Comments: Dental decay of the left high tooth down to the gingiva of the left upper jaw.  There is significant tenderness to palpation of that region.  It does appear that there is a little bit of pus in that area.  I do not identify an and sizable abscess of the upper gingiva.  Palpation of this area does reproduce his pain pattern.  Eyes:      General: No scleral icterus.        Right eye: No discharge.         Left eye: No discharge.      Conjunctiva/sclera:  Conjunctivae normal.      Pupils: Pupils are equal, round, and reactive to light.   Neck:      Thyroid: No thyromegaly.      Comments: No soft tissue neck swelling.  Cardiovascular:      Rate and Rhythm: Normal rate and regular rhythm.      Heart sounds: Normal heart sounds. No murmur heard.  Pulmonary:      Effort: Pulmonary effort is normal. No respiratory distress.      Breath sounds: Normal breath sounds. No wheezing or rales.   Chest:      Chest wall: No tenderness.   Abdominal:      General: Bowel sounds are normal. There is no distension.      Palpations: Abdomen is soft. There is no mass.      Tenderness: There is no abdominal tenderness. There is no guarding or rebound.   Musculoskeletal:         General: No tenderness or deformity. Normal range of motion.      Cervical back: Normal range of motion and neck supple. No rigidity or tenderness.   Lymphadenopathy:      Cervical: No cervical adenopathy.   Skin:     General: Skin is warm and dry.      Capillary Refill: Capillary refill takes less than 2 seconds.      Findings: No erythema or rash.   Neurological:      Mental Status: He is alert and oriented to person, place, and time.      Cranial Nerves: No cranial nerve deficit.   Psychiatric:         Behavior: Behavior normal.         Thought Content: Thought content normal.         ED Course        Procedures              Critical Care time:  none     IV Antibiotics given and/or elevated Lactate of 1.7 and no sepsis note found - Delete this reminder and enter the sepsis note or '.edcms' before signing chart.>>>None         Results for orders placed or performed during the hospital encounter of 05/15/25 (from the past 24 hours)   CBC with platelets differential    Narrative    The following orders were created for panel order CBC with platelets differential.  Procedure                               Abnormality         Status                     ---------                               -----------         ------                      CBC with platelets and ...[[id number]]  Abnormal            Final result               RBC and Platelet Morpho...[[id number]]  Abnormal            Final result                 Please view results for these tests on the individual orders.   Basic metabolic panel   Result Value Ref Range    Sodium 138 135 - 145 mmol/L    Potassium 3.5 3.4 - 5.3 mmol/L    Chloride 101 98 - 107 mmol/L    Carbon Dioxide (CO2) 27 22 - 29 mmol/L    Anion Gap 10 7 - 15 mmol/L    Urea Nitrogen 10.0 6.0 - 20.0 mg/dL    Creatinine 0.97 0.67 - 1.17 mg/dL    GFR Estimate >90 >60 mL/min/1.73m2    Calcium 9.8 8.8 - 10.4 mg/dL    Glucose 110 (H) 70 - 99 mg/dL   Lactic acid whole blood with 1x repeat in 2 hr when >2   Result Value Ref Range    Lactic Acid, Initial 1.7 0.7 - 2.0 mmol/L   CBC with platelets and differential   Result Value Ref Range    WBC Count 7.6 4.0 - 11.0 10e3/uL    RBC Count 7.88 (H) 4.40 - 5.90 10e6/uL    Hemoglobin 14.0 13.3 - 17.7 g/dL    Hematocrit 44.9 40.0 - 53.0 %    MCV 57 (L) 78 - 100 fL    MCH 17.8 (L) 26.5 - 33.0 pg    MCHC 31.2 (L) 31.5 - 36.5 g/dL    RDW 19.7 (H) 10.0 - 15.0 %    Platelet Count 339 150 - 450 10e3/uL    % Neutrophils 65 %    % Lymphocytes 22 %    % Monocytes 10 %    % Eosinophils 2 %    % Basophils 1 %    % Immature Granulocytes 0 %    NRBCs per 100 WBC 0 <1 /100    Absolute Neutrophils 4.9 1.6 - 8.3 10e3/uL    Absolute Lymphocytes 1.6 0.8 - 5.3 10e3/uL    Absolute Monocytes 0.8 0.0 - 1.3 10e3/uL    Absolute Eosinophils 0.2 0.0 - 0.7 10e3/uL    Absolute Basophils 0.1 0.0 - 0.2 10e3/uL    Absolute Immature Granulocytes 0.0 <=0.4 10e3/uL    Absolute NRBCs 0.0 10e3/uL   RBC and Platelet Morphology   Result Value Ref Range    RBC Morphology Confirmed RBC Indices     Platelet Assessment  Automated Count Confirmed. Platelet morphology is normal.     Automated Count Confirmed. Platelet morphology is normal.    Elliptocytes Slight (A) None Seen    Target Cells Slight (A) None Seen   Extra  Tube    Narrative    The following orders were created for panel order Extra Tube.  Procedure                               Abnormality         Status                     ---------                               -----------         ------                     Extra Green Top (Lithiu...[[id number]]                      In process                 Extra Purple Top Tube[[id number]]                           In process                   Please view results for these tests on the individual orders.       Medications   sodium chloride 0.9% BOLUS 1,000 mL (0 mLs Intravenous Stopped 5/15/25 2345)   ampicillin-sulbactam (UNASYN) 3 g vial to attach to  mL bag (0 g Intravenous Stopped 5/15/25 2345)   iopamidol (ISOVUE-370) solution 500 mL (90 mLs Intravenous $Given 5/15/25 2339)   sodium chloride 0.9 % bag for CT scan flush (70 mLs Intravenous $Given 5/15/25 2339)       Assessments & Plan (with Medical Decision Making)  ***    29 year old male presents for evaluation of increasing dental discomfort over the past week and much worse today.  He wonders if there is some pus coming into his mouth.  No fever, chills, nausea, or vomiting.  Does have a history of street drug use.  Also takes Suboxone.  See HPI above for details.  BP (!) 141/90   Pulse 105   Temp 98.7  F (37.1  C) (Oral)   Resp 19   Ht 1.829 m (6')   Wt 101.8 kg (224 lb 8 oz)   SpO2 99%   BMI 30.45 kg/m     Generally healthy-appearing male in no acute distress.  He does have some very mild soft tissue facial swelling of the left maxillary region.  Extensive dental decay down to the gingiva of the IV tooth of the left upper jaw.  Palpation of this area recreates his pain pattern.  I question if there is some small amount of pus around the region.  No incisable abscess.  No trismus or malocclusion.  No soft tissue neck swelling.  No significant cervical adenopathy.  Remainder of the exam is otherwise negative.  Given his abnormal vital signs and evidence for  infection, we did proceed with further workup.  IV was established.  White blood cell count normal at 7600.  Lactic acid level normal at 1.7.  Basic metabolic panel all within normal limits.  CT soft tissue neck ***     I have reviewed the nursing notes.    I have reviewed the findings, diagnosis, plan and need for follow up with the patient.           Medical Decision Making  The patient's presentation was of {[facility] Problem:196728}.    The patient's evaluation involved:  {[facility] Data:102481}    The patient's management necessitated {[facility] Management:438533}.        New Prescriptions    No medications on file       Final diagnoses:   None     Disclaimer: This note consists of symbols derived from keyboarding, dictation and/or voice recognition software. As a result, there may be errors in the script that have gone undetected. Please consider this when interpreting information found in this chart.      5/15/2025   [facility] EMERGENCY DEPT

## 2025-05-21 LAB — BACTERIA SPEC CULT: NO GROWTH

## 2025-05-24 ENCOUNTER — HEALTH MAINTENANCE LETTER (OUTPATIENT)
Age: 30
End: 2025-05-24

## (undated) RX ORDER — GLYCOPYRROLATE 0.2 MG/ML
INJECTION, SOLUTION INTRAMUSCULAR; INTRAVENOUS
Status: DISPENSED
Start: 2020-05-06

## (undated) RX ORDER — FLUMAZENIL 0.1 MG/ML
INJECTION, SOLUTION INTRAVENOUS
Status: DISPENSED
Start: 2020-05-06

## (undated) RX ORDER — LIDOCAINE HYDROCHLORIDE 40 MG/ML
SOLUTION TOPICAL
Status: DISPENSED
Start: 2020-05-06

## (undated) RX ORDER — FENTANYL CITRATE 50 UG/ML
INJECTION, SOLUTION INTRAMUSCULAR; INTRAVENOUS
Status: DISPENSED
Start: 2020-05-06

## (undated) RX ORDER — NALOXONE HYDROCHLORIDE 0.4 MG/ML
INJECTION, SOLUTION INTRAMUSCULAR; INTRAVENOUS; SUBCUTANEOUS
Status: DISPENSED
Start: 2020-05-06